# Patient Record
Sex: FEMALE | Race: WHITE
[De-identification: names, ages, dates, MRNs, and addresses within clinical notes are randomized per-mention and may not be internally consistent; named-entity substitution may affect disease eponyms.]

---

## 2020-05-03 ENCOUNTER — HOSPITAL ENCOUNTER (EMERGENCY)
Dept: HOSPITAL 97 - ER | Age: 70
Discharge: HOME | End: 2020-05-03
Payer: COMMERCIAL

## 2020-05-03 VITALS — OXYGEN SATURATION: 99 % | DIASTOLIC BLOOD PRESSURE: 80 MMHG | SYSTOLIC BLOOD PRESSURE: 171 MMHG

## 2020-05-03 VITALS — TEMPERATURE: 97.2 F

## 2020-05-03 DIAGNOSIS — R19.7: Primary | ICD-10-CM

## 2020-05-03 DIAGNOSIS — Z88.0: ICD-10-CM

## 2020-05-03 DIAGNOSIS — I10: ICD-10-CM

## 2020-05-03 LAB
ALBUMIN SERPL BCP-MCNC: 3.6 G/DL (ref 3.4–5)
ALP SERPL-CCNC: 74 U/L (ref 45–117)
ALT SERPL W P-5'-P-CCNC: 19 U/L (ref 12–78)
ANISOCYTOSIS BLD QL: (no result)
AST SERPL W P-5'-P-CCNC: 16 U/L (ref 15–37)
BLD SMEAR INTERP: (no result)
BUN BLD-MCNC: 16 MG/DL (ref 7–18)
GLUCOSE SERPLBLD-MCNC: 162 MG/DL (ref 74–106)
HCT VFR BLD CALC: 42.3 % (ref 36–45)
LIPASE SERPL-CCNC: 108 U/L (ref 73–393)
LYMPHOCYTES # SPEC AUTO: 1.4 K/UL (ref 0.7–4.9)
MORPHOLOGY BLD-IMP: (no result)
PMV BLD: 7.9 FL (ref 7.6–11.3)
POIKILOCYTOSIS BLD QL SMEAR: (no result)
POTASSIUM SERPL-SCNC: 3.7 MMOL/L (ref 3.5–5.1)
RBC # BLD: 5.29 M/UL (ref 3.86–4.86)

## 2020-05-03 PROCEDURE — 83690 ASSAY OF LIPASE: CPT

## 2020-05-03 PROCEDURE — 96374 THER/PROPH/DIAG INJ IV PUSH: CPT

## 2020-05-03 PROCEDURE — 80048 BASIC METABOLIC PNL TOTAL CA: CPT

## 2020-05-03 PROCEDURE — 85025 COMPLETE CBC W/AUTO DIFF WBC: CPT

## 2020-05-03 PROCEDURE — 99284 EMERGENCY DEPT VISIT MOD MDM: CPT

## 2020-05-03 PROCEDURE — 96361 HYDRATE IV INFUSION ADD-ON: CPT

## 2020-05-03 PROCEDURE — 80076 HEPATIC FUNCTION PANEL: CPT

## 2020-05-03 PROCEDURE — 36415 COLL VENOUS BLD VENIPUNCTURE: CPT

## 2020-05-03 PROCEDURE — 74177 CT ABD & PELVIS W/CONTRAST: CPT

## 2020-05-03 NOTE — XMS REPORT
[facility]

                            Created on:April 3, 2019



Patient:Lesly Dawson

Sex:Female

:1950

External Reference #:[id number]





Demographics







                          Address                   [address] [place] [address]

 

                          Phone                     [phone]

 

                          Preferred Language        en

 

                          Marital Status            Unknown

 

                          Restoration Affiliation     Unknown

 

                          Race                      White

 

                          Ethnic Group              Unknown









Author







                          Organization              [employer]









Care Team Providers







                    Name                Role                Phone

 

                    Bakari Naavrro      Provider Role       Unavailable









Allergies

No Known Allergies



Problems







             Problem Type Condition    Code         Onset Dates  Condition Statu

s

 

             Problem      Metabolic syndrome X E88.81                    Active

 

             Problem      Hyperlipidemia E78.5                     Active

 

             Problem      Benign essential HTN I10                       Active

 

             Problem      Primary osteoarthritis of right M17.11                

    Active



                          knee                                   

 

             Problem      Right sciatic nerve pain M54.31                    Act

carmina

 

             Problem      Pain, joint, knee, right M25.561                   Act

carmina

 

             Problem      GERD (gastroesophageal reflux K21.9                   

  Active



                          disease)                               

 

             Problem      Obese        E66.9                     Active

 

             Problem      Seborrheic keratoses L82.1                     Active

 

             Problem      Seasonal allergic rhinitis, J30.2                     

Active



                          unspecified trigger                           







Medications

No Known Medications



Results

No Known Results



Summary Purpose

eClinicalWorks Submission

## 2020-05-03 NOTE — RAD REPORT
EXAM DESCRIPTION:  CTAbdomen   Pelvis W Contrast - 5/3/2020 7:05 pm

 

CLINICAL HISTORY:  Abdominal pain.

ABD PAIN

 

COMPARISON:  No comparisons

 

TECHNIQUE:  Biphasic CT imaging of the abdomen and pelvis was performed with 100 ml non-ionic IV cont
rast.

 

All CT scans are performed using dose optimization technique as appropriate and may include automated
 exposure control or mA/KV adjustment according to patient size.

 

FINDINGS:  The lung bases are clear.Trace right pleural effusion.

 

The liver, spleen, pancreas, adrenal glands and kidneys are within normal limits.

 

No bowel obstruction, free air, free fluid or abscess. Mild sigmoid diverticulosis without diverticul
itis. Appendectomy suspected.  No evidence of significant lymphadenopathy.

 

No suspicious bony findings.

 

IMPRESSION:  No acute intra-abdominal or pelvic finding.

## 2020-05-03 NOTE — XMS REPORT
[facility]

                          Created on:2019



Patient:Lesly Dawson

Sex:Female

:1950

External Reference #:[id number]





Demographics







                          Address                   [address] [place] [address]

 

                          Phone                     [phone]

 

                          Preferred Language        en

 

                          Marital Status            Unknown

 

                          Hinduism Affiliation     Unknown

 

                          Race                      White

 

                          Ethnic Group              Unknown









Author







                          Organization              [employer]









Care Team Providers







                    Name                Role                Phone

 

                    Jc, Na            Provider Role       Unavailable









Allergies

No Known Allergies



Problems







             Problem Type Condition    Code         Onset Dates  Condition Statu

s

 

             Problem      Metabolic syndrome X E88.81                    Active

 

             Problem      Seborrheic keratoses L82.1                     Active

 

             Problem      Seasonal allergic rhinitis, J30.2                     

Active



                          unspecified trigger                           

 

             Problem      Primary osteoarthritis of left knee M17.12            

        Active

 

             Problem      Primary osteoarthritis of right M17.11                

    Active



                          knee                                   

 

             Problem      Pain, joint, knee, left M25.562                   Acti

ve

 

             Problem      Encounter for general adult medical Z00.00            

        Active



                          examination without abnormal                          

 



                          findings                               

 

             Problem      Screening for osteoporosis Z13.820                   A

ctive

 

             Problem      Right sciatic nerve pain M54.31                    Act

carmina

 

             Problem      Pain, joint, knee, right M25.561                   Act

carmina

 

             Assessment   Screening for osteoporosis Z13.820                   A

ctive

 

             Assessment   Benign essential HTN I10                       Active

 

             Assessment   Encounter for general adult medical Z00.00            

        Active



                          examination without abnormal                          

 



                          findings                               

 

             Problem      Obese        E66.9                     Active

 

             Problem      Benign essential HTN I10                       Active

 

             Assessment   Hyperlipidemia E78.5                     Active

 

             Problem      Hyperlipidemia E78.5                     Active

 

             Problem      GERD (gastroesophageal reflux K21.9                   

  Active



                          disease)                               







Medications

No Known Medications



Results

No Known Results



Summary Purpose

eClinicalWorks Submission

## 2020-05-03 NOTE — XMS REPORT
[facility]

                           Created on:2020



Patient:Lesly Dawson

Sex:Female

:1950

External Reference #:[id number]





Demographics







                          Address                   [address] [place] [address]

 

                          Phone                     [phone]

 

                          Preferred Language        en

 

                          Marital Status            Unknown

 

                          Moravian Affiliation     Unknown

 

                          Race                      White

 

                          Ethnic Group              Unknown









Author







                          Organization              [employer]









Care Team Providers







                    Name                Role                Phone

 

                    Jc, Na            Provider Role       Unavailable









Allergies

No Known Allergies



Problems







             Problem Type Condition    Code         Onset Dates  Condition Statu

s

 

             Problem      Metabolic syndrome X E88.81                    Active

 

             Problem      Seborrheic keratoses L82.1                     Active

 

             Problem      Seasonal allergic rhinitis, J30.2                     

Active



                          unspecified trigger                           

 

             Problem      Obese        E66.9                     Active

 

             Problem      Benign essential HTN I10                       Active

 

             Problem      Hyperlipidemia E78.5                     Active

 

             Problem      GERD (gastroesophageal reflux K21.9                   

  Active



                          disease)                               

 

             Problem      Primary osteoarthritis of left knee M17.12            

        Active

 

             Problem      Primary osteoarthritis of right M17.11                

    Active



                          knee                                   

 

             Problem      Pain, joint, knee, left M25.562                   Acti

ve

 

             Problem      Encounter for general adult medical Z00.00            

        Active



                          examination without abnormal                          

 



                          findings                               

 

             Problem      Screening for osteoporosis Z13.820                   A

ctive

 

             Problem      Right sciatic nerve pain M54.31                    Act

carmina

 

             Problem      Pain, joint, knee, right M25.561                   Act

carmina







Medications

No Known Medications



Results

No Known Results



Summary Purpose

eClinicalWorks Submission

## 2020-05-03 NOTE — ER
Nurse's Notes                                                                                     

 [facility]                                                                 

Name: Lesly Dawson                                                                               

Age: 69 yrs                                                                                       

Sex: Female                                                                                       

: 1950                                                                                   

MRN: [id number]                                                                                   

Arrival Date: 2020                                                                          

Time: 16:42                                                                                       

Account#: [id number]                                                                            

Bed 16                                                                                            

Private MD: Shakira Jc                                                                              

Diagnosis: Nausea and vomiting;Diarrhea, unspecified                                              

                                                                                                  

Presentation:                                                                                     

                                                                                             

16:57 Chief complaint: Patient states: "think I got food poisoning" Reports diarrhea since    ca1 

      0300 today, vomiting at the same time. Took 2 Imodium, diarrhea stopped but vomiting        

      persists. Reports can't keep anything down and abdominal cramping. Coronavirus screen:      

      Proceed with normal triage. Patient denies a cough. Patient denies shortness of breath      

      or difficulty breathing. Patient denies measured and/or subjective temperature greater      

      than 100.4F prior to today's visit. Patient denies travel on a cruise ship or to a          

      country the [facility] currently lists as an affected area. Patient denies contact with known      

      and/or suspected case of COVID-19. Ebola Screen: Patient negative for fever greater         

      than or equal to 101.5 degrees Fahrenheit, and additional compatible Ebola Virus            

      Disease symptoms Patient denies exposure to infectious person. Patient denies travel to     

      an Ebola-affected area in the 21 days before illness onset. No symptoms or risks            

      identified at this time. Initial Sepsis Screen: Does the patient meet any 2 criteria?       

      No. Patient's initial sepsis screen is negative. Does the patient have a suspected          

      source of infection? No. Patient's initial sepsis screen is negative. Risk Assessment:      

      Do you want to hurt yourself or someone else? Patient reports no desire to harm self or     

      others. Onset of symptoms was May 03, 2020 at 03:00.                                        

16:57 Method Of Arrival: Ambulatory                                                           ca1 

16:57 Acuity: STACIE 3                                                                           ca1 

                                                                                                  

Historical:                                                                                       

- Allergies:                                                                                      

17:03 PENICILLINS;                                                                            ca1 

- Home Meds:                                                                                      

17:03 Metoprolol Tartrate Oral [Active]; furosemide Oral [Active]; Quinapril HCl Oral         ca1 

      [Active];                                                                                   

- PMHx:                                                                                           

17:03 Hypertension;                                                                           ca1 

- PSHx:                                                                                           

17:03 Hysterectomy; Appendectomy;                                                             ca1 

                                                                                                  

- Immunization history:: Adult Immunizations up to date, Pneumococcal vaccine is up to            

  date, Flu vaccine is up to date.                                                                

- Social history:: Smoking status: Patient/guardian denies using tobacco, the patient             

  reports quitting approximately 30 years ago.                                                    

                                                                                                  

                                                                                                  

Screenin:05 Abuse screen: Denies threats or abuse. Nutritional screening: No deficits noted.        rb1 

      Tuberculosis screening: No symptoms or risk factors identified. Fall Risk None              

      identified.                                                                                 

                                                                                                  

Assessment:                                                                                       

17:05 General: Appears in no apparent distress. comfortable, Behavior is calm, cooperative,   rb1 

      Denies fever. Pain: Complains of pain in abdomen Pain currently is 8 out of 10 on a         

      pain scale. Quality of pain is described as crampy, Pain began 0300 this morning.           

      Neuro: Level of Consciousness is awake, alert, obeys commands, Oriented to person,          

      place, time, situation. Cardiovascular: Capillary refill < 3 seconds. Respiratory:          

      Airway is patent Respiratory effort is even, unlabored, Respiratory pattern is regular,     

      symmetrical. GI: Reports diarrhea, nausea, vomiting, since 0300 this morning. : No        

      signs and/or symptoms were reported regarding the genitourinary system. Derm: Skin is       

      pink, warm \T\ dry.                                                                         

18:00 Reassessment: Patient appears in no apparent distress at this time. No changes from     rb1 

      previously documented assessment.                                                           

19:30 GI: Abdomen is round non-distended, Reports upper abdominal pain, diarrhea, nausea.     rr5 

19:30 General: Appears in no apparent distress. comfortable, Behavior is calm, cooperative,   rr5 

      appropriate for age, discharge instruction given and explained without complaints           

      made.. Neuro: Level of Consciousness is awake, alert, obeys commands, Oriented to           

      person, place, time, situation. Cardiovascular: Capillary refill < 3 seconds Patient's      

      skin is warm and dry. Respiratory: Airway is patent Respiratory effort is even,             

      unlabored, Respiratory pattern is regular, symmetrical. Derm: Skin is intact, is            

      healthy with good turgor, Skin is pink, warm \T\ dry.                                       

                                                                                                  

Vital Signs:                                                                                      

16:57  / 106; Pulse 100; Resp 18 S; Temp 97.2; Pulse Ox 98% on R/A; Weight 86.18 kg     ca1 

      (R); Height 5 ft. 3 in. (160.02 cm) (R); Pain 8/10;                                         

18:00  / 85; Pulse 86; Resp 17; Pulse Ox 97% on R/A;                                    rb1 

19:30  / 80; Pulse 80; Resp 16; Pulse Ox 99% on R/A;                                    rr5 

16:57 Body Mass Index 33.66 (86.18 kg, 160.02 cm)                                             ca1 

                                                                                                  

ED Course:                                                                                        

16:42 Patient arrived in ED.                                                                  ag5 

16:42 Shakira Jc MD is Private Physician.                                                      ag5 

17:00 Triage completed.                                                                       ca1 

17:03 Arm band placed on right wrist.                                                         ca1 

17:05 Patient has correct armband on for positive identification. Bed in low position. Call   rb1 

      light in reach. Side rails up X 1. Pulse ox on. NIBP on.                                    

17:13 [facility]                                                        kb  

17:13 Chase Henley MD is Attending Physician.                                                kb  

17:28 Lamar Browne, RN is Primary Nurse.                                                   rb1 

17:38 Inserted saline lock: 22 gauge in left antecubital area, using aseptic technique. Blood rb1 

      collected.                                                                                  

19:05 CT Abd/Pelvis - IV Contrast Only In Process Unspecified.                                EDMS

19:39 No provider procedures requiring assistance completed. IV discontinued, intact,         rr5 

      bleeding controlled, No redness/swelling at site. Pressure dressing applied.                

                                                                                                  

Administered Medications:                                                                         

17:55 Drug: NS 0.9% 1000 ml Route: IV; Rate: 1000 ml; Site: left antecubital;                 rb1 

19:30 Follow up: Response: No adverse reaction; IV Status: Completed infusion; IV Intake:     rr5 

      1000ml                                                                                      

17:55 Drug: Zofran (Ondansetron) 4 mg Route: IVP; Site: left antecubital;                     rb1 

19:40 Follow up: Response: No adverse reaction                                                rr5 

17:55 Drug: Bentyl 20 mg Route: PO;                                                           rb1 

19:40 Follow up: Response: No adverse reaction                                                rr5 

                                                                                                  

                                                                                                  

Intake:                                                                                           

19:30 IV: 1000ml; Total: 1000ml.                                                              rr5 

                                                                                                  

Outcome:                                                                                          

19:25 Discharge ordered by MD.                                                                kb  

19:39 Discharged to home ambulatory.                                                          rr5 

19:39 Condition: stable                                                                           

19:39 Discharge instructions given to patient, Instructed on discharge instructions, follow       

      up and referral plans. medication usage, Demonstrated understanding of instructions,        

      follow-up care, medications, Prescriptions given X 2.                                       

19:41 Patient left the ED.                                                                    rr5 

                                                                                                  

Signatures:                                                                                       

Dispatcher [employer]                           EDMS                                                 

Kyra Zuñiga FNP-C FNP-Lamar Alvarez RN                     RN   rb1                                                  

Josue Mejias RN                      RN   rr5                                                  

Elle Fowler RN RN   ca1                                                  

Carlito Parmar                                ag5                                                  

                                                                                                  

Corrections: (The following items were deleted from the chart)                                    

17:57 17:00 Zofran (Ondansetron) 4 mg IVP in left antecubital rb1                             rb1 

                                                                                                  

**************************************************************************************************

## 2020-05-03 NOTE — XMS REPORT
Patient Summary Document

                             Created on:May 3, 2020



Patient:BRENT BARRERA

Sex:Female

:1950

External Reference #:[id number]





Demographics







                          Address                   [address] [place] [address]

 

                          Home Phone                [phone]

 

                          Email Address             [email or web address]

 

                          Preferred Language        Unknown

 

                          Marital Status            Unknown

 

                          Rastafari Affiliation     Unknown

 

                          Race                      Unknown

 

                          Additional Race(s)        Unavailable

 

                          Ethnic Group              Unknown









Author







                          Organization              [facility]

t

 

                          Address                   [address] [place] [address] [address]



                                                    [place] [address]

 

                          Phone                     [phone]









Care Team Providers







                    Name                Role                Phone

 

                    Unavailable         Unavailable         Unavailable









Problems







        Condition Condition Condition Status  Onset   Resolution Last    Treatin

g Comments



        Name    Details Category         Date    Date    Treatment Clinician 



                                                        Date            

 

        Seasonal Seasonal Problem Active                                  



        allergic allergic                                                 



        rhinitis, rhinitis,                                                 



        unspecified unspecified                                                 



        trigger trigger                                                 

 

        Hyperlipide Hyperlipide Problem Active                                  



        isis     isis                                                     

 

        Seborrheic Seborrheic Problem Active                                  



        keratoses keratoses                                                 

 

        Obese   Obese   Problem Active                                  

 

        Metabolic Metabolic Problem Active                                  



        syndrome X syndrome X                                                 

 

        Benign  Benign  Problem Active                                  



        essential essential                                                 



        HTN     HTN                                                     

 

        GERD    GERD    Problem Active                                  



        (gastroesop (gastroesop                                                 



        hageal  hageal                                                  



        reflux  reflux                                                  



        disease) disease)                                                 

 

        Right   Right   Problem Active                                  



        sciatic sciatic                                                 



        nerve pain nerve pain                                                 

 

        Primary Primary Problem Active                                  



        osteoarthri osteoarthri                                                 



        tis of  tis of                                                  



        right knee right knee                                                 

 

        Pain,   Pain,   Problem Active                                  



        joint,  joint,                                                  



        knee, right knee, right                                                 

 

        Pain,   Pain,   Problem Active                                  



        joint,  joint,                                                  



        knee, left knee, left                                                 

 

        Primary Primary Problem Active                                  



        osteoarthri osteoarthri                                                 



        tis of left tis of left                                                 



        knee    knee                                                    

 

        Encounter Encounter Problem Active                                  



        for general for general                                                 



        adult   adult                                                   



        medical medical                                                 



        examination examination                                                 



        without without                                                 



        abnormal abnormal                                                 



        findings findings                                                 

 

        Screening Screening Problem Active                                  



        for     for                                                     



        osteoporosi osteoporosi                                                 



        s       s                                                       







Allergies, Adverse Reactions, Alerts







        Allergy Allergy Status  Severity Reaction(s) Onset   Inactive Treating C

omments



        Name    Type                            Date    Date    Clinician 

 

        PCN     Adverse Active          Info Not                         



                Reaction                 Available                         







Medications







       Ordered Filled Start  Stop   Current Ordering Indication Dosage Frequency

 Signature

                          Comments                  Components



      Medication Medication Date  Date  Medication? Clinician                   

(SIG)       



      Name  Name                                                        

 

      Benzonatate Benzonatate 2020- Yes   Na Jc                   1 cap

saman       



                  3-06  04-05                               as needed       



                  00:00: 00:00                                           



                  00    :00                                             







Encounters







        Start   End     Encounter Admission Attending Care    Care    Encounter



        Date/Time Date/Time Type    Type    Clinicians Facility Department ID

 

        2020 Outpatient                 [facility]

[id number]



        15:38:00 15:38:00                         [facility]  Family  



                                                Medicine Medicine 

 

        2020 Outpatient                 [facility]

[id number]



        14:22:00 14:22:00                         Specialty/U Specialty/U 



                                                rology  rology  



                                                Clinic  [facility]  

 

        2019 Outpatient                 [facility]

[id number]



        09:00:00 09:00:00                         [employer] 



                                                [place] 

 

        2019 Outpatient                 [facility]

[id number]



        12:18:00 12:18:00                         [employer] 



                                                [place] 

 

        2019 Outpatient                 [facility]

[id number]



        13:00:00 13:00:00                         [employer] 



                                                [place] 

 

        2019 Outpatient                 [facility]

[id number]



        09:22:00 09:22:00                         [employer] 



                                                [place] 

 

        2019 Outpatient                 [facility]

[id number]



        13:30:00 13:30:00                         [facility]   [facility]   



                                                [facility] 

 

        2019 Outpatient                 [facility]

[id number]



        14:00:00 14:00:00                         [facility] 



                                                [facility]   [facility]   



                                                [facility] 

 

        2019 Outpatient                 [facility]

[id number]



        11:42:00 11:42:00                         [facility] 



                                                [facility]   Joint   



                                                [facility] 

 

        2019 Outpatient                 [facility]

[id number]



        11:00:00 11:00:00                         [facility] 



                                                [facility]   [facility]   



                                                [facility] 

 

        2019-04-15 2019-04-15 Outpatient                 [facility]

[id number]



        16:04:00 16:04:00                         [facility] 

 

        2019 Outpatient                 [facility]

[id number]



        15:41:00 15:41:00                         [facility] 



                                                [facility]   Joint   



                                                [facility] 

 

        2019 Outpatient                 [facility]

[id number]



        09:30:00 09:30:00                         [facility] 



                                                [facility]   Joint   



                                                [facility] 

 

        2019 Outpatient                 [facility]

[id number]



        14:19:00 14:19:00                         [facility] 



                                                [facility]   Joint   



                                                [facility] 

 

        2019 Outpatient                 [facility]

[id number]



        16:44:00 16:44:00                         [facility]

## 2020-05-03 NOTE — XMS REPORT
[facility]

                            Created on:2019



Patient:Lesly Dawson

Sex:Female

:1950

External Reference #:[id number]





Demographics







                          Address                   [address] [place] [address]

 

                          Phone                     [phone]

 

                          Preferred Language        en

 

                          Marital Status            Unknown

 

                          Nondenominational Affiliation     Unknown

 

                          Race                      White

 

                          Ethnic Group              Unknown









Author







                          Organization              [employer]









Care Team Providers







                    Name                Role                Phone

 

                    Bakari Navarro      Provider Role       Unavailable









Allergies, Adverse Reactions, Alerts







                    Substance           Reaction            Event Type

 

                    PCN                 Info Not Available  Drug Allergy







Problems







             Problem Type Condition    Code         Onset Dates  Condition Statu

s

 

             Problem      Hyperlipidemia E78.5                     Active

 

             Problem      GERD (gastroesophageal reflux K21.9                   

  Active



                          disease)                               

 

             Problem      Obese        E66.9                     Active

 

             Problem      Pain, joint, knee, left M25.562                   Acti

ve

 

             Problem      Pain, joint, knee, right M25.561                   Act

carmina

 

             Problem      Primary osteoarthritis of left knee M17.12            

        Active

 

             Problem      Seborrheic keratoses L82.1                     Active

 

             Problem      Seasonal allergic rhinitis, J30.2                     

Active



                          unspecified trigger                           

 

             Problem      Primary osteoarthritis of right M17.11                

    Active



                          knee                                   

 

             Problem      Right sciatic nerve pain M54.31                    Act

carmina

 

             Assessment   Primary osteoarthritis of left knee M17.12            

        Active

 

             Problem      Metabolic syndrome X E88.81                    Active

 

             Assessment   Primary osteoarthritis of right M17.11                

    Active



                          knee                                   

 

             Problem      Benign essential HTN I10                       Active







Medications







        Medication Code    Code    Instructions Start   End     Status  Dosage



                System                  Date    Date            

 

        Low-Dose Aspirin NDC     0                               Active  not



                                                                defined

 

        Tums    NDC     16443904259 500 MG Orally                 Active  1 tabl

et



                                Once a day                         

 

        Hydrochlorothiazide NDC     68975589402 25 MG Orally                 Act

carmina  1 tablet



                                Once a day                         in the



                                                                morning

 

        Accupril NDC     85285709537 40 MG Orally                 Active  1 tabl

et



                                Once a day                         

 

        Tessalon Perles NDC     [id number] 100MG                   Active  1 ca

p(s)



                                                                3 times a



                                                                day as



                                                                needed

 

        Flonase ND     78812253419 50 MCG/ACT                 Active  1 spray



                                Nasally Once a                         in each



                                day                             nostril

 

        Omeprazole NDC     20241042730 40 MG Orally                 Active  1 ca

psule



                                Once a day                         

 

        Metoprolol Succinate NDC     [id number] 25 MG Orally                 Ac

tive  1 tablet



        ER                      Once a day                         







Results

No Known Results



Summary Purpose

[employer] Submission

## 2020-05-03 NOTE — XMS REPORT
[facility]

                            Created on:2019



Patient:Lesly Dawson

Sex:Female

:1950

External Reference #:[id number]





Demographics







                          Address                   [address] [place] [address]

 

                          Phone                     [phone]

 

                          Preferred Language        en

 

                          Marital Status            Unknown

 

                          Caodaism Affiliation     Unknown

 

                          Race                      White

 

                          Ethnic Group              Unknown









Author







                          Organization              [facility]









Care Team Providers







                    Name                Role                Phone

 

                    Jc, Na            Provider Role       Unavailable









Allergies

No Known Allergies



Problems







             Problem Type Condition    Code         Onset Dates  Condition Statu

s

 

             Problem      Hyperlipidemia E78.5                     Active

 

             Problem      GERD (gastroesophageal reflux K21.9                   

  Active



                          disease)                               

 

             Problem      Obese        E66.9                     Active

 

             Problem      Metabolic syndrome X E88.81                    Active

 

             Problem      Benign essential HTN I10                       Active

 

             Problem      Pain, joint, knee, left M25.562                   Acti

ve

 

             Problem      Pain, joint, knee, right M25.561                   Act

carmina

 

             Problem      Primary osteoarthritis of left knee M17.12            

        Active

 

             Problem      Seborrheic keratoses L82.1                     Active

 

             Problem      Seasonal allergic rhinitis, J30.2                     

Active



                          unspecified trigger                           

 

             Problem      Primary osteoarthritis of right M17.11                

    Active



                          knee                                   

 

             Problem      Right sciatic nerve pain M54.31                    Act

carmina







Medications







        Medication Code System Code    Instructions Start   End Date Status  Dos

age



                                        Date                    

 

        Rogers Lazaro NDC     [id number] 100MG                   Active  1 ca

p(s) 3



                                                                times a



                                                                day as



                                                                needed







Results

No Known Results



Summary Purpose

[employer] Submission

## 2020-05-03 NOTE — XMS REPORT
[facility]

                            Created on:2019



Patient:Lesly Dawson

Sex:Female

:1950

External Reference #:[id number]





Demographics







                          Address                   [address] [place] [address]

 

                          Phone                     [phone]

 

                          Preferred Language        en

 

                          Marital Status            Unknown

 

                          Christianity Affiliation     Unknown

 

                          Race                      White

 

                          Ethnic Group              Unknown









Author







                          Organization              [facility]









Care Team Providers







                    Name                Role                Phone

 

                    Bakari Navarro      Provider Role       Unavailable









Allergies

No Known Allergies



Problems







             Problem Type Condition    Code         Onset Dates  Condition Statu

s

 

             Problem      Seasonal allergic rhinitis, J30.2                     

Active



                          unspecified trigger                           

 

             Problem      Hyperlipidemia E78.5                     Active

 

             Problem      Seborrheic keratoses L82.1                     Active

 

             Problem      Obese        E66.9                     Active

 

             Problem      Metabolic syndrome X E88.81                    Active

 

             Problem      Benign essential HTN I10                       Active

 

             Problem      GERD (gastroesophageal reflux K21.9                   

  Active



                          disease)                               







Medications

No Known Medications



Results

No Known Results



Summary Purpose

[employer] Submission

## 2020-05-03 NOTE — XMS REPORT
[facility]

                            Created on:2020



Patient:Lesly Dawson

Sex:Female

:1950

External Reference #:[id number]





Demographics







                          Address                   [address] [place] [address]

 

                          Phone                     [phone]

 

                          Preferred Language        en

 

                          Marital Status            Unknown

 

                          Caodaism Affiliation     Unknown

 

                          Race                      White

 

                          Ethnic Group              Unknown









Author







                          Organization              [facility]









Care Team Providers







                    Name                Role                Phone

 

                    Jc, Na            Provider Role       Unavailable









Allergies

No Known Allergies



Problems







             Problem Type Condition    Code         Onset Dates  Condition Statu

s

 

             Problem      Metabolic syndrome X E88.81                    Active

 

             Problem      Seborrheic keratoses L82.1                     Active

 

             Problem      Seasonal allergic rhinitis, J30.2                     

Active



                          unspecified trigger                           

 

             Problem      Obese        E66.9                     Active

 

             Problem      Benign essential HTN I10                       Active

 

             Problem      Hyperlipidemia E78.5                     Active

 

             Problem      GERD (gastroesophageal reflux K21.9                   

  Active



                          disease)                               

 

             Problem      Primary osteoarthritis of left knee M17.12            

        Active

 

             Problem      Primary osteoarthritis of right M17.11                

    Active



                          knee                                   

 

             Problem      Pain, joint, knee, left M25.562                   Acti

ve

 

             Problem      Encounter for general adult medical Z00.00            

        Active



                          examination without abnormal                          

 



                          findings                               

 

             Problem      Screening for osteoporosis Z13.820                   A

ctive

 

             Problem      Right sciatic nerve pain M54.31                    Act

carmina

 

             Problem      Pain, joint, knee, right M25.561                   Act

carmina







Medications







        Medication Code    Code    Instructions Start   End Date Status  Dosage



                System                  Date                    

 

        Benzonatate NDC     [id number] 100 MG Orally , , Active

  1 capsule



                                two times a day 2020            as neede

d



                                prn cough                         







Results

No Known Results



Summary Purpose

[employer] Submission

## 2020-05-03 NOTE — XMS REPORT
[facility]

                          Created on:2019



Patient:Lesly Dawson

Sex:Female

:1950

External Reference #:[id number]





Demographics







                          Address                   [address] [place] [address]

 

                          Phone                     [phone]

 

                          Preferred Language        en

 

                          Marital Status            Unknown

 

                          Jainism Affiliation     Unknown

 

                          Race                      White

 

                          Ethnic Group              Unknown









Author







                          Organization              [employer]









Care Team Providers







                    Name                Role                Phone

 

                    Jc, Na            Provider Role       Unavailable









Allergies

No Known Allergies



Problems







             Problem Type Condition    Code         Onset Dates  Condition Statu

s

 

             Problem      Metabolic syndrome X E88.81                    Active

 

             Problem      Seborrheic keratoses L82.1                     Active

 

             Problem      Seasonal allergic rhinitis, J30.2                     

Active



                          unspecified trigger                           

 

             Problem      Obese        E66.9                     Active

 

             Problem      Benign essential HTN I10                       Active

 

             Problem      Hyperlipidemia E78.5                     Active

 

             Problem      GERD (gastroesophageal reflux K21.9                   

  Active



                          disease)                               

 

             Problem      Primary osteoarthritis of left knee M17.12            

        Active

 

             Problem      Primary osteoarthritis of right M17.11                

    Active



                          knee                                   

 

             Problem      Pain, joint, knee, left M25.562                   Acti

ve

 

             Problem      Encounter for general adult medical Z00.00            

        Active



                          examination without abnormal                          

 



                          findings                               

 

             Problem      Screening for osteoporosis Z13.820                   A

ctive

 

             Problem      Right sciatic nerve pain M54.31                    Act

carmina

 

             Problem      Pain, joint, knee, right M25.561                   Act

carmina







Medications

No Known Medications



Results

No Known Results



Summary Purpose

[employer] Submission

## 2020-05-03 NOTE — XMS REPORT
[facility]

                            Created on:2019



Patient:Lesly Dawson

Sex:Female

:1950

External Reference #:[id number]





Demographics







                          Address                   [address] [place] [address]

 

                          Phone                     [phone]

 

                          Preferred Language        en

 

                          Marital Status            Unknown

 

                          Judaism Affiliation     Unknown

 

                          Race                      White

 

                          Ethnic Group              Unknown









Author







                          Organization              [employer]









Care Team Providers







                    Name                Role                Phone

 

                    NavarroBakari      Provider Role       Unavailable









Allergies

No Known Allergies



Problems







             Problem Type Condition    Code         Onset Dates  Condition Statu

s

 

             Problem      Hyperlipidemia E78.5                     Active

 

             Problem      GERD (gastroesophageal reflux K21.9                   

  Active



                          disease)                               

 

             Problem      Obese        E66.9                     Active

 

             Problem      Metabolic syndrome X E88.81                    Active

 

             Problem      Benign essential HTN I10                       Active

 

             Problem      Pain, joint, knee, left M25.562                   Acti

ve

 

             Problem      Pain, joint, knee, right M25.561                   Act

carmina

 

             Problem      Primary osteoarthritis of left knee M17.12            

        Active

 

             Problem      Seborrheic keratoses L82.1                     Active

 

             Problem      Seasonal allergic rhinitis, J30.2                     

Active



                          unspecified trigger                           

 

             Problem      Primary osteoarthritis of right M17.11                

    Active



                          knee                                   

 

             Problem      Right sciatic nerve pain M54.31                    Act

carmina







Medications

No Known Medications



Results

No Known Results



Summary Purpose

[employer] Submission

## 2020-05-03 NOTE — XMS REPORT
[facility]

                          Created on:2019



Patient:Lesly Dawson

Sex:Female

:1950

External Reference #:[id number]





Demographics







                          Address                   [address] [place] [address]

 

                          Phone                     [phone]

 

                          Preferred Language        en

 

                          Marital Status            Unknown

 

                          Hinduism Affiliation     Unknown

 

                          Race                      White

 

                          Ethnic Group              Unknown









Author







                          Organization              [employer]









Care Team Providers







                    Name                Role                Phone

 

                    Jc, Na            Provider Role       Unavailable









Allergies

No Known Allergies



Problems







             Problem Type Condition    Code         Onset Dates  Condition Statu

s

 

             Problem      Metabolic syndrome X E88.81                    Active

 

             Problem      Seborrheic keratoses L82.1                     Active

 

             Problem      Seasonal allergic rhinitis, J30.2                     

Active



                          unspecified trigger                           

 

             Problem      Primary osteoarthritis of left knee M17.12            

        Active

 

             Problem      Primary osteoarthritis of right M17.11                

    Active



                          knee                                   

 

             Problem      Pain, joint, knee, left M25.562                   Acti

ve

 

             Problem      Encounter for general adult medical Z00.00            

        Active



                          examination without abnormal                          

 



                          findings                               

 

             Problem      Screening for osteoporosis Z13.820                   A

ctive

 

             Problem      Right sciatic nerve pain M54.31                    Act

carmina

 

             Problem      Pain, joint, knee, right M25.561                   Act

carmina

 

             Assessment   Cough        R05                       Active

 

             Assessment   Sinus congestion R09.81                    Active

 

             Problem      Obese        E66.9                     Active

 

             Problem      Benign essential HTN I10                       Active

 

             Assessment   Seasonal allergic rhinitis, J30.2                     

Active



                          unspecified trigger                           

 

             Problem      Hyperlipidemia E78.5                     Active

 

             Problem      GERD (gastroesophageal reflux K21.9                   

  Active



                          disease)                               







Medications







        Medication Code    Code    Instructions Start   End     Status  Dosage



                System                  Date    Date            

 

        Hydrochlorothiazide NDC     [id number] 25 MG Orally                 Act

carmina  1 tablet



                                Once a day                         in the



                                                                morning

 

        Accupril NDC     [id number] 40 MG Orally                 Active  1 tabl

et



                                Once a day                         

 

        Metoprolol Succinate NDC     [id number] 25 MG Orally                 Ac

tive  1 tablet



        ER                      Once a day                         

 

        Benzonatate ND     [id number] 100 MG Orally Dec 20, Asim     Active  1 

capsule



                                two times a     19,             as needed



                                day prn cough                     

 

        Flonase ND     [id number] 50 MCG/ACT                 Active  1 spray



                                Nasally Once a                         in each



                                day                             nostril

 

        Fish Oil ND     [id number] 1000 MG Orally                 Active  1 ca

psule



                                Once a day                         







Results

No Known Results



Summary Purpose

eClinicalWorks Submission

## 2020-05-03 NOTE — EDPHYS
Physician Documentation                                                                           

 [facility]                                                                 

Name: Lesly Dawson                                                                               

Age: 69 yrs                                                                                       

Sex: Female                                                                                       

: 1950                                                                                   

MRN: [id number]                                                                                   

Arrival Date: 2020                                                                          

Time: 16:42                                                                                       

Account#: [id number]                                                                            

Bed 16                                                                                            

Private MD: Shakira Jc                                                                              

ED Physician Chase Henley                                                                         

HPI:                                                                                              

                                                                                             

18:08 This 69 yrs old  Female presents to ER via Ambulatory with complaints of       kb  

      Nausea/Vomiting/Diarrhea.                                                                   

18:08 The patient presents to the emergency department with nausea, vomiting, diarrhea,       kb  

      abdominal pain, described as crampy. Onset: The symptoms/episode began/occurred this        

      morning, at 03:00. Possible causes: unknown. The symptoms are aggravated by nothing.        

      The symptoms are alleviated by nothing. Associated signs and symptoms: Pertinent            

      positives: abdominal pain, diarrhea, nausea, vomiting. Severity of symptoms: At their       

      worst the symptoms were moderate in the emergency department the symptoms are               

      unchanged. The patient has not experienced similar symptoms in the past. The patient        

      has not recently seen a physician. Pt reports diarrhea that started at 0300, then n/v       

      and abd cramps. Denies fever.                                                               

                                                                                                  

Historical:                                                                                       

- Allergies:                                                                                      

17:03 PENICILLINS;                                                                            ca1 

- Home Meds:                                                                                      

17:03 Metoprolol Tartrate Oral [Active]; furosemide Oral [Active]; Quinapril HCl Oral         ca1 

      [Active];                                                                                   

- PMHx:                                                                                           

17:03 Hypertension;                                                                           ca1 

- PSHx:                                                                                           

17:03 Hysterectomy; Appendectomy;                                                             ca1 

                                                                                                  

- Immunization history:: Adult Immunizations up to date, Pneumococcal vaccine is up to            

  date, Flu vaccine is up to date.                                                                

- Social history:: Smoking status: Patient/guardian denies using tobacco, the patient             

  reports quitting approximately 30 years ago.                                                    

                                                                                                  

                                                                                                  

ROS:                                                                                              

18:07 Constitutional: Negative for fever, chills, and weight loss, ENT: Negative for injury,  kb  

      pain, and discharge, Neck: Negative for injury, pain, and swelling, Cardiovascular:         

      Negative for chest pain, palpitations, and edema, Respiratory: Negative for shortness       

      of breath, cough, wheezing, and pleuritic chest pain, Back: Negative for injury and         

      pain, MS/Extremity: Negative for injury and deformity, Skin: Negative for injury, rash,     

      and discoloration, Neuro: Negative for headache, weakness, numbness, tingling, and          

      seizure.                                                                                    

18:07 Abdomen/GI: Positive for nausea, vomiting, and diarrhea, abdominal cramps.                  

                                                                                                  

Exam:                                                                                             

18:07 Constitutional:  This is a well developed, well nourished patient who is awake, alert,  kb  

      and in no acute distress. Head/Face:  Normocephalic, atraumatic. Chest/axilla:  Normal      

      chest wall appearance and motion.  Nontender with no deformity.  No lesions are             

      appreciated. Cardiovascular:  Regular rate and rhythm with a normal S1 and S2.  No          

      gallops, murmurs, or rubs.  Normal PMI, no JVD.  No pulse deficits. Respiratory:  Lungs     

      have equal breath sounds bilaterally, clear to auscultation and percussion.  No rales,      

      rhonchi or wheezes noted.  No increased work of breathing, no retractions or nasal          

      flaring. Back:  No spinal tenderness.  No costovertebral tenderness.  Full range of         

      motion. Skin:  Warm, dry with normal turgor.  Normal color with no rashes, no lesions,      

      and no evidence of cellulitis. MS/ Extremity:  Pulses equal, no cyanosis.                   

      Neurovascular intact.  Full, normal range of motion. Neuro:  Awake and alert, GCS 15,       

      oriented to person, place, time, and situation.  Cranial nerves II-XII grossly intact.      

      Motor strength 5/5 in all extremities.  Sensory grossly intact.  Cerebellar exam            

      normal.  Normal gait.                                                                       

18:07 Abdomen/GI: Inspection: abdomen appears normal, Bowel sounds: normal, in all quadrants,     

      Palpation: soft, in all quadrants, mild abdominal tenderness, in the right lower            

      quadrant and left lower quadrant.                                                           

                                                                                                  

Vital Signs:                                                                                      

16:57  / 106; Pulse 100; Resp 18 S; Temp 97.2; Pulse Ox 98% on R/A; Weight 86.18 kg     ca1 

      (R); Height 5 ft. 3 in. (160.02 cm) (R); Pain 8/10;                                         

18:00  / 85; Pulse 86; Resp 17; Pulse Ox 97% on R/A;                                    rb1 

19:30  / 80; Pulse 80; Resp 16; Pulse Ox 99% on R/A;                                    rr5 

16:57 Body Mass Index 33.66 (86.18 kg, 160.02 cm)                                             ca1 

                                                                                                  

MDM:                                                                                              

17:13 Patient medically screened.                                                             kb  

18:08 Data reviewed: vital signs, nurses notes. Data interpreted: Pulse oximetry: on room air kb  

      is 97 %. Interpretation: normal.                                                            

19:15 Counseling: I had a detailed discussion with the patient and/or guardian regarding: the kb  

      historical points, exam findings, and any diagnostic results supporting the                 

      discharge/admit diagnosis, lab results, radiology results, the need for outpatient          

      follow up, a family practitioner, to return to the emergency department if symptoms         

      worsen or persist or if there are any questions or concerns that arise at home.             

                                                                                                  

                                                                                             

17:13 Order name: Basic Metabolic Panel; Complete Time: 18:10                                 kb  

                                                                                             

17:13 Order name: CBC with Diff; Complete Time: 19:20                                         kb  

                                                                                             

17:13 Order name: Hepatic Function; Complete Time: 18:10                                      kb  

                                                                                             

17:13 Order name: Lipase; Complete Time: 18:10                                                kb  

                                                                                             

18:11 Order name: CT Abd/Pelvis - IV Contrast Only; Complete Time: 19:14                      kb  

                                                                                             

19:13 Order name: CBC Smear Scan; Complete Time: 19:20                                        EDMS

                                                                                             

17:13 Order name: IV Saline Lock; Complete Time: 17:57                                        kb  

                                                                                             

17:13 Order name: Labs collected and sent; Complete Time: 17:57                               kb  

                                                                                             

19:15 Order name: PO challenge; Complete Time: 19:25                                          kb  

                                                                                                  

Administered Medications:                                                                         

17:55 Drug: NS 0.9% 1000 ml Route: IV; Rate: 1000 ml; Site: left antecubital;                 rb1 

19:30 Follow up: Response: No adverse reaction; IV Status: Completed infusion; IV Intake:     rr5 

      1000ml                                                                                      

17:55 Drug: Zofran (Ondansetron) 4 mg Route: IVP; Site: left antecubital;                     rb1 

19:40 Follow up: Response: No adverse reaction                                                rr5 

17:55 Drug: Bentyl 20 mg Route: PO;                                                           rb1 

19:40 Follow up: Response: No adverse reaction                                                rr5 

                                                                                                  

                                                                                                  

Disposition:                                                                                      

                                                                                             

07:21 Co-signature as Attending Physician, Chase Henley MD.                                    rn  

                                                                                                  

Disposition:                                                                                      

20 19:25 Discharged to Home. Impression: Nausea and vomiting, Diarrhea, unspecified.        

- Condition is Stable.                                                                            

- Discharge Instructions: Food Choices to Help Relieve Diarrhea, Adult, Viral                     

  Gastroenteritis, Adult, Easy-to-Read, Nausea and Vomiting, Adult, Easy-to-Read,                 

  Diarrhea, Adult, Easy-to-Read.                                                                  

- Prescriptions for Bentyl 20 mg Oral Tablet - take 1 tablet by ORAL route every 6                

  hours As needed; 20 tablet. Zofran 4 mg Oral Tablet - take 1 tablet by ORAL route               

  every 6 hours As needed; 20 tablet.                                                             

- Medication Reconciliation Form, Thank You Letter, Antibiotic Education, Prescription            

  Opioid Use form.                                                                                

- Follow up: Emergency Department; When: As needed; Reason: Worsening of condition.               

  Follow up: Private Physician; When: 2 - 3 days; Reason: Recheck today's complaints,             

  Continuance of care, Re-evaluation by your physician.                                           

                                                                                                  

                                                                                                  

                                                                                                  

Signatures:                                                                                       

Dispatcher [employer]                           EDMS                                                 

Kyra Zuñiga, FNP-C                 FNP-CkChase Crowley MD MD   rn                                                   

Lamar Browne, RN                     RN   rb1                                                  

Josue Mejias RN                      RN   rr5                                                  

Elle Fowler RN                        RN   ca1                                                  

                                                                                                  

Corrections: (The following items were deleted from the chart)                                    

                                                                                             

19:41 19:25 2020 19:25 Discharged to Home. Impression: Nausea and vomiting; Diarrhea,   rr5 

      unspecified. Condition is Stable. Forms are Medication Reconciliation Form, Thank You       

      Letter, Antibiotic Education, Prescription Opioid Use. Follow up: Emergency Department;     

      When: As needed; Reason: Worsening of condition. Follow up: Private Physician; When: 2      

      - 3 days; Reason: Recheck today's complaints, Continuance of care, Re-evaluation by         

      your physician. kb                                                                          

                                                                                                  

**************************************************************************************************

## 2020-05-03 NOTE — XMS REPORT
[facility]

                            Created on:2019



Patient:Lesly Dawson

Sex:Female

:1950

External Reference #:[id number]





Demographics







                          Address                   [address] [place] [id number]

 

                          Phone                     [phone]

 

                          Preferred Language        en

 

                          Marital Status            Unknown

 

                          Christianity Affiliation     Unknown

 

                          Race                      White

 

                          Ethnic Group              Unknown









Author







                          Organization              [employer]









Care Team Providers







                    Name                Role                Phone

 

                    Navarro Bakari      Provider Role       Unavailable









Allergies, Adverse Reactions, Alerts







                    Substance           Reaction            Event Type

 

                    PCN                 Info Not Available  Drug Allergy







Problems







             Problem Type Condition    Code         Onset Dates  Condition Statu

s

 

             Problem      Metabolic syndrome X E88.81                    Active

 

             Problem      Hyperlipidemia E78.5                     Active

 

             Problem      Benign essential HTN I10                       Active

 

             Assessment   Right sciatic nerve pain M54.31                    Act

carmina

 

             Assessment   Primary osteoarthritis of right M17.11                

    Active



                          knee                                   

 

             Assessment   Pain, joint, knee, right M25.561                   Act

carmina

 

             Problem      Primary osteoarthritis of right M17.11                

    Active



                          knee                                   

 

             Problem      Right sciatic nerve pain M54.31                    Act

carmina

 

             Problem      Pain, joint, knee, right M25.561                   Act

carmina

 

             Problem      GERD (gastroesophageal reflux K21.9                   

  Active



                          disease)                               

 

             Problem      Obese        E66.9                     Active

 

             Problem      Seborrheic keratoses L82.1                     Active

 

             Problem      Seasonal allergic rhinitis, J30.2                     

Active



                          unspecified trigger                           







Medications







        Medication Code    Code    Instructions Start   End     Status  Dosage



                System                  Date    Date            

 

        Tums    NDC     83592263923 500 MG Orally                 Active  1 tabl

et



                                Once a day                         

 

        Low-Dose Aspirin NDC     0                               Active  not



                                                                defined

 

        Hydrochlorothiazide NDC     19106277043 25 MG Orally                 Act

carmina  1 tablet



                                Once a day                         in the



                                                                morning

 

        Tessalon Perles NDC     [id number] 100MG                   Active  1 ca

p(s)



                                                                3 times a



                                                                day as



                                                                needed

 

        Metoprolol Succinate NDC     70498251554 25 MG Orally                 Ac

tive  1 tablet



        ER                      Once a day                         

 

        Omeprazole NDC     74336667605 40 MG Orally                 Active  1 ca

psule



                                Once a day                         

 

        Flonase ND     60387068865 50 MCG/ACT                 Active  1 spray



                                Nasally Once a                         in each



                                day                             nostril

 

        Accupril NDC     80966260789 40 MG Orally                 Active  1 tabl

et



                                Once a day                         







Results

No Known Results



Summary Purpose

[employer] Submission

## 2020-05-03 NOTE — XMS REPORT
[facility]

                             Created on:May 1, 2019



Patient:Lesyl Dawson

Sex:Female

:1950

External Reference #:[id number]





Demographics







                          Address                   [address] [place] [address]

 

                          Phone                     [phone]

 

                          Preferred Language        en

 

                          Marital Status            Unknown

 

                          Jain Affiliation     Unknown

 

                          Race                      White

 

                          Ethnic Group              Unknown









Author







                          Organization              [employer]









Care Team Providers







                    Name                Role                Phone

 

                    Bakari Navarro      Provider Role       Unavailable









Allergies, Adverse Reactions, Alerts







                    Substance           Reaction            Event Type

 

                    PCN                 Info Not Available  Drug Allergy







Problems







             Problem Type Condition    Code         Onset Dates  Condition Statu

s

 

             Problem      Hyperlipidemia E78.5                     Active

 

             Problem      GERD (gastroesophageal reflux K21.9                   

  Active



                          disease)                               

 

             Problem      Obese        E66.9                     Active

 

             Problem      Pain, joint, knee, left M25.562                   Acti

ve

 

             Problem      Pain, joint, knee, right M25.561                   Act

carmina

 

             Problem      Primary osteoarthritis of left knee M17.12            

        Active

 

             Problem      Seborrheic keratoses L82.1                     Active

 

             Problem      Seasonal allergic rhinitis, J30.2                     

Active



                          unspecified trigger                           

 

             Problem      Primary osteoarthritis of right M17.11                

    Active



                          knee                                   

 

             Problem      Right sciatic nerve pain M54.31                    Act

carmina

 

             Assessment   Primary osteoarthritis of left knee M17.12            

        Active

 

             Problem      Metabolic syndrome X E88.81                    Active

 

             Assessment   Primary osteoarthritis of right M17.11                

    Active



                          knee                                   

 

             Problem      Benign essential HTN I10                       Active







Medications







        Medication Code    Code    Instructions Start   End     Status  Dosage



                System                  Date    Date            

 

        Hydrochlorothiazide NDC     48688128260 25 MG Orally                 Act

carmina  1 tablet



                                Once a day                         in the



                                                                morning

 

        Low-Dose Aspirin NDC     0                               Active  not



                                                                defined

 

        Tessalon Perles NDC     58935849340 100MG                   Active  1 ca

p(s)



                                                                3 times a



                                                                day as



                                                                needed

 

        Tums    NDC     14126901808 500 MG Orally                 Active  1 tabl

et



                                Once a day                         

 

        Metoprolol Succinate NDC     86656718636 25 MG Orally                 Ac

tive  1 tablet



        ER                      Once a day                         

 

        Accupril NDC     30801967772 40 MG Orally                 Active  1 tabl

et



                                Once a day                         

 

        Flonase NDC     48528495658 50 MCG/ACT                 Active  1 spray



                                Nasally Once a                         in each



                                day                             nostril

 

        Omeprazole NDC     84849188571 40 MG Orally                 Active  1 ca

psule



                                Once a day                         







Results

No Known Results



Summary Purpose

[employer] Submission

## 2020-05-03 NOTE — XMS REPORT
[facility]

                          Created on:2019



Patient:Lesly Dawson

Sex:Female

:1950

External Reference #:[id number]





Demographics







                          Address                   [address] [place] [id number]

 

                          Phone                     [phone]

 

                          Preferred Language        en

 

                          Marital Status            Unknown

 

                          Zoroastrianism Affiliation     Unknown

 

                          Race                      White

 

                          Ethnic Group              Unknown









Author







                          Organization              [employer]









Care Team Providers







                    Name                Role                Phone

 

                    Jc, Na            Provider Role       Unavailable









Allergies, Adverse Reactions, Alerts







                    Substance           Reaction            Event Type

 

                    PCN                 Info Not Available  Drug Allergy







Problems







             Problem Type Condition    Code         Onset Dates  Condition Statu

s

 

             Problem      Metabolic syndrome X E88.81                    Active

 

             Problem      Seborrheic keratoses L82.1                     Active

 

             Problem      Seasonal allergic rhinitis, J30.2                     

Active



                          unspecified trigger                           

 

             Problem      Primary osteoarthritis of left knee M17.12            

        Active

 

             Assessment   Skin tags, multiple acquired L91.8                    

 Active

 

             Problem      Primary osteoarthritis of right M17.11                

    Active



                          knee                                   

 

             Problem      Pain, joint, knee, left M25.562                   Acti

ve

 

             Problem      Encounter for general adult medical Z00.00            

        Active



                          examination without abnormal                          

 



                          findings                               

 

             Problem      Screening for osteoporosis Z13.820                   A

ctive

 

             Problem      Right sciatic nerve pain M54.31                    Act

carmina

 

             Problem      Pain, joint, knee, right M25.561                   Act

carmina

 

             Assessment   Seasonal allergic rhinitis, J30.2                     

Active



                          unspecified trigger                           

 

             Assessment   Hyperlipidemia E78.5                     Active

 

             Assessment   Seborrheic keratoses L82.1                     Active

 

             Assessment   GERD (gastroesophageal reflux K21.9                   

  Active



                          disease)                               

 

             Problem      Obese        E66.9                     Active

 

             Problem      Benign essential HTN I10                       Active

 

             Assessment   Benign essential HTN I10                       Active

 

             Problem      Hyperlipidemia E78.5                     Active

 

             Problem      GERD (gastroesophageal reflux K21.9                   

  Active



                          disease)                               







Medications







        Medication Code    Code    Instructions Start   End     Status  Dosage



                System                  Date    Date            

 

        Accupril NDC     63980546668 40 MG Orally                 Active  1 tabl

et



                                Once a day                         

 

        Metoprolol Succinate NDC     50153460488 25 MG Orally                 Ac

tive  1 tablet



        ER                      Once a day                         

 

        Hydrochlorothiazide NDC     33858834512 25 MG Orally                 Act

carmina  1 tablet



                                Once a day                         in the



                                                                morning

 

        Fish Oil ND     93493272264 1000 MG Orally                 Active  1 ca

psule



                                Once a day                         

 

        Flonase ND     09545524697 50 MCG/ACT                 Active  1 spray



                                Nasally Once a                         in each



                                day                             nostril







Results

No Known Results



Summary Purpose

[employer]ClinicalWorks Submission

## 2022-03-04 LAB — INR BLD: 1.01

## 2022-03-04 NOTE — RAD REPORT
EXAM DESCRIPTION:  RAD - Chest Pa And Lat (2 Views) - 3/4/2022 11:56 am

 

CLINICAL HISTORY:  Pre op pending knee replacement

 

COMPARISON:  No comparisons

 

FINDINGS:  Lines: None.

Lungs: No evidence of edema or pneumonia.

Pleural: No significant pleural effusions or pneumothorax.

Cardiac: The heart size is within normal limits.

Bones: No acute fractures.

Other:

 

IMPRESSION:  No acute cardiopulmonary disease.

## 2022-03-09 ENCOUNTER — HOSPITAL ENCOUNTER (OUTPATIENT)
Dept: HOSPITAL 97 - OR | Age: 72
Setting detail: OBSERVATION
LOS: 3 days | Discharge: HOME HEALTH | End: 2022-03-12
Attending: ORTHOPAEDIC SURGERY | Admitting: ORTHOPAEDIC SURGERY
Payer: COMMERCIAL

## 2022-03-09 VITALS — BODY MASS INDEX: 36 KG/M2

## 2022-03-09 DIAGNOSIS — E78.5: ICD-10-CM

## 2022-03-09 DIAGNOSIS — M17.12: Primary | ICD-10-CM

## 2022-03-09 DIAGNOSIS — E66.9: ICD-10-CM

## 2022-03-09 DIAGNOSIS — Z87.891: ICD-10-CM

## 2022-03-09 DIAGNOSIS — Z20.822: ICD-10-CM

## 2022-03-09 DIAGNOSIS — Z88.0: ICD-10-CM

## 2022-03-09 DIAGNOSIS — I10: ICD-10-CM

## 2022-03-09 DIAGNOSIS — K21.9: ICD-10-CM

## 2022-03-09 LAB — HCT VFR BLD CALC: 39.7 % (ref 36–45)

## 2022-03-09 PROCEDURE — 97116 GAIT TRAINING THERAPY: CPT

## 2022-03-09 PROCEDURE — 85018 HEMOGLOBIN: CPT

## 2022-03-09 PROCEDURE — 88304 TISSUE EXAM BY PATHOLOGIST: CPT

## 2022-03-09 PROCEDURE — 97139 UNLISTED THERAPEUTIC PX: CPT

## 2022-03-09 PROCEDURE — 97110 THERAPEUTIC EXERCISES: CPT

## 2022-03-09 PROCEDURE — 97530 THERAPEUTIC ACTIVITIES: CPT

## 2022-03-09 PROCEDURE — 88305 TISSUE EXAM BY PATHOLOGIST: CPT

## 2022-03-09 PROCEDURE — 85610 PROTHROMBIN TIME: CPT

## 2022-03-09 PROCEDURE — 97161 PT EVAL LOW COMPLEX 20 MIN: CPT

## 2022-03-09 PROCEDURE — 85014 HEMATOCRIT: CPT

## 2022-03-09 PROCEDURE — 0SRC069 REPLACEMENT OF RIGHT KNEE JOINT WITH OXIDIZED ZIRCONIUM ON POLYETHYLENE SYNTHETIC SUBSTITUTE, CEMENTED, OPEN APPROACH: ICD-10-PCS

## 2022-03-09 PROCEDURE — 73560 X-RAY EXAM OF KNEE 1 OR 2: CPT

## 2022-03-09 PROCEDURE — 27447 TOTAL KNEE ARTHROPLASTY: CPT

## 2022-03-09 PROCEDURE — 85730 THROMBOPLASTIN TIME PARTIAL: CPT

## 2022-03-09 PROCEDURE — 88311 DECALCIFY TISSUE: CPT

## 2022-03-09 PROCEDURE — 94010 BREATHING CAPACITY TEST: CPT

## 2022-03-09 PROCEDURE — 36415 COLL VENOUS BLD VENIPUNCTURE: CPT

## 2022-03-09 PROCEDURE — 71046 X-RAY EXAM CHEST 2 VIEWS: CPT

## 2022-03-09 RX ADMIN — SODIUM CHLORIDE SCH MLS: 9 INJECTION, SOLUTION INTRAVENOUS at 16:07

## 2022-03-09 RX ADMIN — METOPROLOL SUCCINATE SCH MG: 100 TABLET, EXTENDED RELEASE ORAL at 21:11

## 2022-03-09 NOTE — RAD REPORT
EXAM DESCRIPTION:  RAD - Knee Left 2 View - 3/9/2022 11:14 am

 

CLINICAL HISTORY:  Knee surgery

 

FINDINGS:  Postoperative changes of a left knee arthroplasty.

 

Prosthesis is in good position.

 

No fracture or dislocation

## 2022-03-09 NOTE — P.OP
Preoperative diagnosis: left knee osteoarthritis


Postoperative diagnosis: same


Primary procedure: left total knee arthroplasty


Anesthesia: general


Estimated blood loss: 20 cc


Specimen: left knee bone remnants


Findings: see dictation


Operative Technique: 





Indication For Procedure: Lesly is a 71 year-old male presenting to my clinic 

with signs, symptoms and x-ray findings consistent with severe left knee 

osteoarthritis.  I discussed with the patient at length risks and benefits 

associated with operative and nonoperative treatment.  She had failed 

conservative treatment measures and had significant difficulties with ADLs 

secondary to her pain.  We discussed operative treatment and elected to proceed 

with left total knee arthroplasty.  She expressed understanding and elected to 

proceed with operative treatment.





Description Of Procedure:  After informed consent was obtained, the patient was 

identified in the preoperative holding area.  The left lower extremity was 

marked.  The patient was then taken to the PACU where he underwent a leftt lower

extremity adductor canal block performed by Anesthesia.  She was then taken to 

the operating room, transferred to the operating table in supine fashion, and 

placed under general anesthesia.  The left lower extremity was then prepped and 

draped in usual sterile fashion.  A time-out was initiated.  The correct patient

and procedure were confirmed and identified.  The patient did receive her 

preoperative prophylactic antibiotics.  The left lower extremity was then 

exsanguinated and tourniquet was inflated to 300 mmHg.  Approximately 15 cm 

longitudinal incision was made centered over the anterior aspect of the left 

knee.  Dissection was then taken to the extensor mechanism and a medial 

parapatellar arthrotomy was performed.  The patella was everted and dislocated 

laterally and the knee was flexed in the fat pad.  Medial lateral meniscus and 

ACL were all excised exposing the distal femur.  Excess hypertrophic synovium 

was also excised within the suprapatellar pouch.  The patient had an MRI of her 

left knee preoperatively for surgical planning and creation of cutting blocks.  

The cutting block was then placed over the distal femur and pins were then 

placed.  The distal femoral cutting block was then placed over the pins.  Knee 

joint was then used to ensure proper depth cut and the distal femur was then 

cut.  The chamfer cutting guide was then placed over the distal end of the 

femur.  Anterior, posterior cuts as well as anterior and posterior chamfer cuts 

were then made again confirming proper depth of the cut using an Dane wing.  

Excess bone remnants were then sent to pathology for further evaluation.  Next, 

attention was taken to the proximal tibia.  A tibial jig and tibial cutting 

block was then placed on proximal aspect of the left tibia and locked into 

position.  Pins were then placed and alignment guide was then used to confirm 

proper alignment of the cut and then coronal and sagittal planes.  Once this was

confirmed, the cutting jig was placed over the pins and the proximal tibia was 

cut.    Sizing trays were then selected and size 10 mm spacer was used and there

was good overall balance in flexion and extension.  Next, the trial implants 

were then placed using the size 9 narrow CR femur and a size E tibia with an 10 

mm MC poly.  There was overall good range of motion and good stability.  The 

trial implants were then removed.  This improved the overall stability of the 

knee and components.  The wound was then irrigated thoroughly with normal saline

and the knee was then injected with 30 cc of 0.5% Marcaine both in the posterior

capsule and mediallateral gutters as well as quadriceps tendon and periosteum.  

The tibia was then punched.  The femur was drilled.  The cement was then 

prepared on the back table.  Cement was then placed first on the tibial surface 

followed by size E tibia.  Excess cement was removed with Shapleigh elevators.  Size

9 narrow CR femur was then placed on the distal femur after cement was placed on

the distal femur.  Excess cement was then removed and a size 10 mm MC trial poly

was then placed.  The knee was held in extension as the cement hardened.  

Undersurface of the patella was prepared debriding osteophytes using rongeurs as

well as osteophytes..  Cement was placed on the undersurface of the patella 

after it was cut and a size 29 patella was placed.  Once the cement was 

hardened, the knee was ranged, there was good overall stability both in flexion,

extension and as well as stability with varus and valgus stresses.  Trial poly 

was then removed and a size 10 mm MC poly was then placed and locked into 

position.  The knee was then ranged again.  There was good overall range of 

motion both for flexion and extension with good stability. The wound was then 

irrigated again thoroughly with normal saline using pulse lavage.  Tourniquet 

was let down.  Hemostasis was achieved using Bovie electrocautery.  Extensor 

mechanism was then approximated using a #1 Vicryl both in interrupted and 

running fashion.  The fascia was then approximated using 0 Vicryl.  Subcutaneous

tissue was approximated with a 2-0 Vicryl.  Skin was approximated using staples.

 Sterile dressings were applied.  The patient was awakened and transferred back 

in stable condition


Complications: None


Implants: Biomet Nick 9 Narrow CR femur, E tibia, 10 MC poly, 29 patella


Fluids & blood products: per anesthesia record; TT: 76 mins @ 300 mmHg


Transferred to: Recovery Room


Condition: Good

## 2022-03-09 NOTE — XMS REPORT
Continuity of Care Document

                            Created on:2022



Patient:Lesly Dawson

Sex:Female

:1950

External Reference #:[id number]





Demographics







                          Address                   [address] [place] [address]

 

                          Home Phone                [phone]

 

                          Mobile Phone              [phone]

 

                          Preferred Language        en

 

                          Marital Status            Unknown

 

                          Sikhism Affiliation     Unknown

 

                          Race                      Unknown

 

                          Additional Race(s)        White

 

                          Ethnic Group              Unknown









Author







                          Organization              [facility]

t

 

                          Address                   [address]



                                                    [place] [address]

 

                          Phone                     [phone]









Support







                Name            Relationship    Address         Phone

 

                Tricia        Child           Unavailable     [phone]

 

                Samir           Unavailable     [address]  [phone]



                                                [place] [address] 









Care Team Providers







                    Name                Role                Phone

 

                    Ying Jc           Primary Care Physician [phone]

 

                    IRINA Jc            Attending Clinician Unavailable

 

                    Liang MEJIA              Attending Clinician [phone]

 

                    Doctor Unassigned,  Name Attending Clinician Unavailable

 

                    Vanessa-Mbayo_A_AH    Attending Clinician Unavailable

 

                    Vanessa-Mbayo_A_AH    Admitting Clinician Unavailable









Payers







           Payer Name Policy Type Policy Number Effective Date Expiration Date S

garrett

 

           [facility] [place] -            [id number]  2020            



           [employer]                        00:00:00              



           (MEDICARE                                              



           REPLACEMENT/ADVANT                                             



           AGE - HMO)                                             







Problems







       Condition Condition Condition Status Onset  Resolution Last   Treating Co

mments 

Source



       Name   Details Category        Date   Date   Treatment Clinician        



                                                 Date                 

 

       No known No known Disease                                           Unive

rs



       active active                                                  ity of



       problems problems                                                  [place]



                                                                      Medical



                                                                      [place]







Allergies, Adverse Reactions, Alerts







       Allergy Allergy Status Severity Reaction(s) Onset  Inactive Treating Comm

ents 

Source



       Name   Type                        Date   Date   Clinician        

 

       Penicill Propensi Active        Rash   2021                      Un[facility]

s



       in     ty to                                               ity of



              adverse                      00:00:                      [place]



              reaction                      00                          Medical



              s                                                       Branch

 

       PCN    Adverse Active        Info Not                             [facility]



              Reaction               Available                             [facility]



                                                                      [facility]



                                                                      l



                                                                      Outpati



                                                                      ent



                                                                      Clinics







Social History







           Social Habit Start Date Stop Date  Quantity   Comments   Source

 

           Exposure to                       Not sure              [facility]

 [place]



           SARS-CoV-2 (event)                                             Medica

l Branch

 

           Tobacco use and 2021 Never used            [facility]



           exposure   00:00:00   00:00:00                         Medical Branch

 

           Sex Assigned At [birth year]                       [facility]



           Birth      00:00:00   00:00:00                         [facility]









                Smoking Status  Start Date      Stop Date       Source

 

                Unknown if ever smoked                                 [facility]

 

                Former smoker   2021 00:00:00 2021 00:00:00 [facility]







Medications







       Ordered Filled Start  Stop   Current Ordering Indication Dosage Frequency

 Signature

                    Comments            Components          Source



     Medication Medication Date Date Medication? Clinician                (SIG) 

          



     Name Name                                                   

 

     No known      2021      No                                      Univers



     medications      2-29                                              ity of



               15:29:                                              [address]

 

     No known      2021      No                                      [facility]



     medications      2-29                                              ity of



               15:29:                                              [address]

 

     No known      2021      No                                      [facility]



     medications      2-29                                              ity of



               15:29:                                              [address]

 

     No known      2021      No                                      Univers



     medications      2-29                                              ity of



               15:29:                                              [address]

 

     No known      2021      No                                      [facility]



     medications      2-29                                              ity of



               15:29:                                              [address]

 

     PredniSONE PredniSONE 2020-0 2020- No   Na Jc                one tablet  

         [facility]



               7-15                           with food           [facility]



               00:00: 00:00                                         [facility]



               00   [facility]00                                          [facility]



                                                                 Clinics

 

     Metoprolol Metoprolol           Yes  Na Jc                1 tablet       

    [facility]



     Succinate Succinate                                                   [facility]

 -



     ER   ER                                                     [facility]



                                                                 l



                                                                 Outpati



                                                                 ent



                                                                 Clinics

 

     Flonase Flonase           Yes  Na Jc                1 spray in           

[facility]



                                                  each           kes -



                                                  nostril           [facility]



                                                                 l



                                                                 Outpati



                                                                 ent



                                                                 [facility]

 

     Hydrochloro Hydrochloro           Yes  Na Jc                1 tablet     

      [facility]



     thiazide thiazide                                    in the           [facility]

 -



                                                  morning           [facility]



                                                                 l



                                                                 Outpati



                                                                 ent



                                                                 [facility]

 

     Fish Oil Fish Oil           Yes  Na Jc                1 capsule          

 [facility]



                                                                 [facility]



                                                                 [facility]



                                                                 l



                                                                 Outpati



                                                                 ent



                                                                 Clinics

 

     Accupril Accupril           Yes  Na Jc                1 tablet           

[facility]



                                                                 [facility]



                                                                 [facility]



                                                                 l



                                                                 Outpati



                                                                 ent



                                                                 Clinics







Vital Signs







             Vital Name   Observation Time Observation Value Comments     Source

 

             Systolic blood 2021 21:01:00 134 mm[Hg]                [facility]

 

             Diastolic blood 2021 21:01:00 84 mm[Hg]                 Unive

[facility]

 

             Heart rate   2021 21:01:00 84 /min                   [facility]

 

             Body height  2021 21:01:00 160 cm                    [facility]

 

             Body weight  2021 21:01:00 86.183 kg                 [facility]

 

             BMI          2021 21:01:00 33.66 kg/m2               [facility]

 

             Oxygen saturation 2021 21:01:00 95 /min                   [facility]



             in Arterial blood                                        Medical Br

anch



             by Pulse oximetry                                        







Procedures







                Procedure       Date / Time     Performing Clinician Source



                                Performed                       

 

                MEDICAL         2022 06:01:00 Doctor Unassigned, No [facility]



                RELEASE/CLEARANCE FORMS                 [facility]

 

                AUTHORIZATION FOR 2022 06:01:00 Doctor Unassigned, No Univ

[facility]



                RELEASE OF PHI                  Name            [facility]

 

                EKG-12 LEAD     2021 21:08:22 Liang [facility] o

f [facility]

 

                EXTERNAL PROVIDER - ADC 2021 06:01:00 Doctor Unassigned, N

o [facility]



                REFERRAL                        [facility]







Encounters







        Start   End     Encounter Admission Attending Care    Care    Encounter 

Source



        Date/Time Date/Time Type    Type    Clinicians Facility Department ID   

   

 

        2022         Outpatient         Jc, Na [facility]  [facility]  [id number]

[address] [facility]



        14:23:00                                                    [facility]



                                                                        l



                                                                        Outpati



                                                                        ent



                                                                        Clinics

 

        2022-02-15         Outpatient         Jc, Na [facility]  [facility]  [id number]

[address] [facility]



        08:54:02                                                    [facility]



                                                                        [facility]



                                                                        l



                                                                        Outpati



                                                                        ent



                                                                        Clinics

 

        2022         Outpatient         Jc, Na [facility]  [facility]  [id number]

[address] [facility]



        13:45:16                                                 [id number]   [facility]



                                                                        l



                                                                        Outpati



                                                                        ent



                                                                        Clinics

 

        2022         Outpatient         Jc, Na [facility]  [facility]  [id number]

[address] [facility]



        12:49:56                                                 [id number]   [facility]



                                                                        [facility]



                                                                        l



                                                                        Outpati



                                                                        ent



                                                                        Clinics

 

        2022         Outpatient         Jc, Na [facility]  [facility]  [id number]

[address] [facility]



        12:08:12                                                 [id number]   [facility]



                                                                        l



                                                                        Outpati



                                                                        ent



                                                                        Clinics

 

        2022         Outpatient         Jc, Na [facility]  ST[facility]  [id number]

[address] [facility]



        12:07:42                                                 [id number]   [facility]



                                                                        l



                                                                        Outpati



                                                                        ent



                                                                        Clinics

 

        2022         Outpatient         Jc, Na [facility]  ST[facility]  [id number]

[address] [facility]



        11:54:04                                                 [id number]   [facility]



                                                                        [facility]



                                                                        l



                                                                        Outpati



                                                                        ent



                                                                        Clinics

 

        2022         Outpatient         Jc, Na STLC  STLC  [id number]

[address] [facility]



        11:53:42                                                 [id number]   [facility]



                                                                        l



                                                                        Outpati



                                                                        ent



                                                                        Clinics

 

        2022         Outpatient         Jc, Na STLC  STLMLC  [id number]

[address] [facility]



        11:53:11                                                 [id number]   [facility]



                                                                        l



                                                                        Outpati



                                                                        ent



                                                                        Clinics

 

        2022         [facility]  [id number]

[address] [facility]



        11:25:47                                                 [id number]   [facility]



                                                                        [facility]



                                                                        l



                                                                        Outpati



                                                                        ent



                                                                        Clinics

 

        2022 Telephone         Liang,    [facility]    [id number]

[address] [facility]



        00:00:00 00:00:00                 [place] .1.13.10         

ity of



                                                [place] [id number]         [place]

[place]



                                                [place] [phone]         [address]                 

 

        2022 ambulatory                 [facility]  STLMLC  [id number]

 [facility]



        00:00:00 00:00:00                                                 [facility]



                                                                        [facility]



                                                                        l



                                                                        Outpati



                                                                        ent



                                                                        Clinics

 

        2022 ambulatory                 STLMLC  STLMLC  [id number]

 [facility]



        00:00:00 00:00:00                                                 [facility] 

[facility]



                                                                        [facility]



                                                                        l



                                                                        Outpati



                                                                        ent



                                                                        Clinics

 

        2022 ambulatory                 STLMLC  STLMLC  [id number]

 [facility]



        00:00:00 00:00:00                                                 [facility] 

[facility]



                                                                        [facility]



                                                                        l



                                                                        Outpati



                                                                        ent



                                                                        Clinics

 

        2022 Orders          Doctor GALEAS    [id number]

96 [facility]



        00:00:00 00:00:00 Only            Unassigned, KENZIE   [id number]       

  ity of



                                        [facility] [id number]         [place]

as



                                                        [phone]         [address]

 

        2022 ambulatory                 STLMLC  STLMLC  [id number]

 [facility]



        00:00:00 00:00:00                                                 [facility]



                                                                        [facility]



                                                                        l



                                                                        Outpati



                                                                        ent



                                                                        Clinics

 

        2022 Orders          Doctor ADAL [id number]

82 [facility]



        00:00:00 00:00:00 Only            Unassigned, KENZIE   [id number]       

  ity of



                                        [facility] [id number]         [place]

as



                                                        [phone]         [address]

 

        2022 ambulatory                 STLMLC  STLMLC  [id number]

 [facility]



        00:00:00 00:00:00                                                 [facility] 

[facility]



                                                                        [facility]



                                                                        l



                                                                        Outpati



                                                                        ent



                                                                        Clinics

 

        2022 ambulatory                 STLMLC  STLMLC  [id number]

 [facility]



        00:00:00 00:00:00                                                 [facility]



                                                                        [facility]



                                                                        l



                                                                        Outpati



                                                                        ent



                                                                        Clinics

 

        2022 ambulatory                 STLMLC  STLMLC  [id number]

 [facility]



        00:00:00 00:00:00                                                 [facility] 

[facility]



                                                                        [facility]



                                                                        l



                                                                        Outpati



                                                                        ent



                                                                        Clinics

 

        2021 Office          Liang,    [facility]    [id number]

03 [facility]



        14:40:00 15:22:14 Visit           [place] .1.13.10         

ity of



                                                [place] [id number]         [place]

[place]



                                                [place] [phone]         [place]

[place] [id number]             [facility]                 

 

        2021 Orders          Doctor  ADAL    [id number]

93 [facility]



        00:00:00 00:00:00 Only            Unassigned, KENZIE   [id number]       

  ity of



                                        [place] [facility] [id number]         [place]

as



                                                        [phone]         [address]

 

        2021 ambulatory                 STLMLC  STLMLC  [id number]

 [facility]



        00:00:00 00:00:00                                                 [facility] 

[facility]



                                                                        [facility]



                                                                        l



                                                                        Outpati



                                                                        ent



                                                                        Clinics

 

        2021 ambulatory                 STLMLC  STLMLC  [id number]

 [facility]



        00:[facility]00 [facility]00[facility]00                                                 [facility]



                                                                        [facility]



                                                                        Outpati



                                                                        ent



                                                                        Clinics

 

        2021 ambulatory                 STLMLC  STLMLC  [id number]

 [facility]



        00:00[facility]00 00[facility]00[facility]00                                                 [facility]



                                                                        l



                                                                        Outpati



                                                                        ent



                                                                        Clinics

 

        2021 ambulatory                 STLMLC  STLMLC  [id number]

 [facility]



        00:00:00 00[facility]00:00                                                 [facility]



                                                                        l



                                                                        Outpati



                                                                        ent



                                                                        Clinics

 

        2021 ambulatory                 STLMLC  STLMLC  [id number]

 [facility]



        00:00:00 00[facility]00:00                                                 [facility]



                                                                        l



                                                                        Outpati



                                                                        ent



                                                                        Clinics

 

        2021 ambulatory                 STLMLC  STLMLC  [id number]

 [facility]



        00:00:00 00[facility]00:00                                                 [facility]



                                                                        l



                                                                        Outpati



                                                                        ent



                                                                        Clinics

 

        2021 ambulatory                 STLMLC  STLMLC  [id number]

 [facility]



        00:00:00 00[facility]00:00                                                 [facility]



                                                                        [facility]



                                                                        Outpati



                                                                        ent



                                                                        Clinics

 

        2021 Outpatient                 STLMLC  STLMLC  [id number]

 [facility]



        00:00:00 00[facility]00:00                                                 [facility]



                                                                        [facility]



                                                                        Outpati



                                                                        ent



                                                                        Clinics

 

        2021 Outpatient                 STLMLC  STLMLC  [id number]

 [facility]



        00:00:00 00[facility]00:00                                                 [facility]



                                                                        l



                                                                        Outpati



                                                                        ent



                                                                        Clinics

 

        2021 Outpatient                 STLMLC  STLMLC  [id number]

 [facility]



        00:00[facility]00 00[facility]00:00                                                 [facility]



                                                                        l



                                                                        Outpati



                                                                        ent



                                                                        Clinics

 

        2020 Outpatient                 STLMLC  STLMLC  [id number]

 [facility]



        00:00:00 00[facility]00:00                                                 [facility]



                                                                        l



                                                                        Outpati



                                                                        ent



                                                                        Clinics

 

        2020-12-10 2020-12-10 Outpatient                 STLMLC  STLMLC  [id number]

 [facility]



        00:00:00 00[facility]00:00                                                 [facility]



                                                                        l



                                                                        Outpati



                                                                        ent



                                                                        Clinics

 

        2020 Outpatient                 STLMLC  STLMLC  [id number]

 [facility]



        00:00:00 00:00:00                                                 [facility]



                                                                        l



                                                                        Outpati



                                                                        ent



                                                                        Clinics

 

        2020 Outpatient                 STLMLC  STLMLC  [id number]

 [facility]



        00:00:00 00:00:00                                                 [facility]



                                                                        l



                                                                        Outpati



                                                                        ent



                                                                        Clinics

 

        2020-10-12 2020-10-12 Outpatient                 STLMLC  STLMLC  [id number]

 [facility]



        00:00:00 00:00:00                                                 [facility]



                                                                        l



                                                                        Outpati



                                                                        ent



                                                                        Clinics

 

        2020-09-15 2020-09-15 Outpatient                 STLM  STLC  [id number]

 [facility]



        00:00:00 00:00:00                                                 [facility]



                                                                        l



                                                                        Outpati



                                                                        ent



                                                                        Clinics

 

        2020-07-15 2020-07-15 Outpatient                 [facility]

[id number] [facility]



        08:41:00 08:41:00                         t [facility]   [facility]

s [employer]   [facility]         l



                                                Medicine                 Outpati



                                                                        ent



                                                                        Clinics

 

        2020 Outpatient                 [facility]

[id number] [facility]



        11:00:00 11:00:00                         t [facility]   [facility]

s -



                                                [employer]   [facility]  [facility]         l



                                                Medicine                 Outpati



                                                                        ent



                                                                        Clinics

 

        2020 Outpatient                 [facility]

[id number] [facility]



        16:26:00 16:26:00                         t [facility]   [facility]

s [employer]   [facility]         l



                                                Medicine                 Outpati



                                                                        ent



                                                                        Clinics

 

        2020 Outpatient                 [facility]

[id number] [facility]



        16:01:00 16:01:00                         t [facility]   [facility]

s [employer]   [facility]         l



                                                Medicine                 Outpati



                                                                        ent



                                                                        Clinics

 

        2020 Outpatient                 [facility]

[id number] [facility]



        10:20:00 10:20:00                         t [facility]   [facility]

s [employer]   [facility]         l



                                                Medicine                 Outpati



                                                                        ent



                                                                        Clinics

 

        2020 Outpatient                 [facility]

[id number] [facility]



        10:15:00 10:15:00                         t [employer]

s [employer]   [facility]  [facility]         l



                                                Medicine                 Outpati



                                                                        ent



                                                                        Clinics

 

        2020 Outpatient                 [facility]

[id number] [facility]



        10:40:00 10:40:00                         t [facility]   [facility]

s [employer]   [facility]         l



                                                Medicine                 Outpati



                                                                        ent



                                                                        Clinics

 

        2020 Outpatient                 [facility]

[id number] [facility]



        11:20:00 11:20:00                         t [facility]   [facility]

s [employer]   [facility]         l



                                                Medicine                 Outpati



                                                                        ent



                                                                        Clinics

 

        2020 Outpatient                 [facility]

[id number] [facility]



        11:45:00 11:45:00                         t [facility]   [facility]

s [employer]   [facility]         l



                                                Medicine                 Outpati



                                                                        ent



                                                                        Clinics

 

        2020 Outpatient                 [facility]

[id number] [facility]



        10:23:00 10:23:00                         t [facility]   [facility]

s -



                                                Drive   [facility]                 Outpati



                                                                        ent



                                                                        Clinics

 

        2020 Outpatient                 [facility]

[id number] [facility]



        15:38:00 15:38:00                         t [facility]   [facility]

s -



                                                Drive   [facility]                 Outpati



                                                                        ent



                                                                        Clinics

 

        2020 Outpatient         [facility]     [address]

[address] [place] [address]



        07:23:00 07:23:00                 _A_AH                   [id number]   Family



                                                                        Practic



                                                                        e

 

        2020 Outpatient                 [facility]

[id number] [facility]



        14:22:00 14:22:00                         t       Specialty/U         Katarzyna

kes -



                                                Specialty rology          [facility]

a



                                                /[facility]          l



                                                Clinic                  Outpati



                                                                        ent



                                                                        Clinics

 

        2019 Outpatient                 [facility]

[id number] [facility]



        09:00:00 09:00:00                         t [facility]   [facility]

s -



                                                Drive   [facility]                 Outpati



                                                                        ent



                                                                        Clinics

 

        2019 Outpatient                 [facility] [address]

[id number] [facility]



        12:18:00 12:18:00                         t [facility]   [facility]

s -



                                                Drive   [facility]                 Outpati



                                                                        ent



                                                                        Clinics

 

        2019 Outpatient                 [facility] [address]

[id number] [facility]



        13:00:00 13:00:00                         t [facility]   [facility]

s -



                                                Drive   [facility]                 Outpati



                                                                        ent



                                                                        Clinics

 

        2019 Outpatient                 [facility] [address]

[id number] [facility]



        09:22:00 09:22:00                         t [facility]   [facility]

s -



                                                Drive   [facility]                 Outpati



                                                                        ent



                                                                        Clinics

 

        2019 Outpatient                 [facility]

[id number] [facility]



        13:30:00 13:30:00                         t Bone  Bone and         [facility]

 -



                                                and Joint Joint           [facility]

a



                                                [facility]         ent



                                                                        Clinics

 

        2019 Outpatient                 [facility]

[id number] [facility]



        14:00:00 14:00:00                         t Bone  Bone and         [facility]

 -



                                                and [facility] Joint           [facility]

a



                                                [facility]         ent



                                                                        Clinics

 

        2019 Outpatient                 [facility]

[id number] [facility]



        11:42:00 11:42:00                         t Bone  Bone and         [facility]

 -



                                                and Joint Joint           [facility]

a



                                                [facility] of [facility]         [facility]         ent



                                                                        Clinics

 

        2019 Outpatient                 [facility] [address]

[id number] [facility]



        11:00:00 11:00:00                         t Bone  Bone and         [facility]

 -



                                                and [facility] Joint           [facility]

a



                                                [facility]         [facility]         ent



                                                                        [facility]

 

        2019-04-15 2019-04-15 Outpatient                 [facility]

[id number] [facility]



        16:04:00 16:04:00                         t [employer]         [employer] [facility]

 

        2019 Outpatient                 [facility]

[id number] [facility]



        15:41:00 15:41:00                         t Bone  Bone and         [facility]

 -



                                                and Joint Joint           [facility]

a



                                                Clinic of [facility]         [facility]         ent



                                                                        Clinics

 

        2019 Outpatient                 [facility]

[id number] [facility]



        09:30:00 09:30:00                         t Bone  Bone and         [facility]

 -



                                                and Joint Joint           [facility]

a



                                                [facility]         [facility]         ent



                                                                        Clinics

 

        2019 Outpatient                 [facility] [address]

[id number] [facility]



        14:19:00 14:19:00                         t Bone  Bone and         [facility]

 -



                                                and Joint Joint           [facility]

a



                                                [facility]         [facility]         ent



                                                                        [facility]

 

        2019 Outpatient                 [facility]

[id number] [facility]



        16:44:00 16:44:00                         t [employer] [facility]







Results

This patient has no known results.

## 2022-03-09 NOTE — P.BOP
Preoperative diagnosis: left knee osteoarthritis


Postoperative diagnosis: same


Primary procedure: left total knee arthroplasty


Assistant: NONE,NONE


Estimated blood loss: 20 cc


Specimen: left knee bone remnants


Anesthesia: General


Complications: None


Implants: [employer] Person[employer] 9 Narrow CR femur, E tibia, 29 patella, 10 MC 

poly


Fluids & blood products: per anesthesia record; TT: 76 mins @ 300 mmHg


Transferred to: Recovery Room


Condition: Good

## 2022-03-10 LAB — HCT VFR BLD CALC: 37.2 % (ref 36–45)

## 2022-03-10 RX ADMIN — Medication SCH: at 08:38

## 2022-03-10 RX ADMIN — SODIUM CHLORIDE SCH MLS: 9 INJECTION, SOLUTION INTRAVENOUS at 01:00

## 2022-03-10 RX ADMIN — CELECOXIB SCH MG: 100 CAPSULE ORAL at 08:36

## 2022-03-10 RX ADMIN — ENOXAPARIN SODIUM SCH MG: 30 INJECTION SUBCUTANEOUS at 05:44

## 2022-03-10 RX ADMIN — HYDROCODONE BITARTRATE AND ACETAMINOPHEN PRN TAB: 7.5; 325 TABLET ORAL at 01:46

## 2022-03-10 RX ADMIN — CHOLECALCIFEROL TAB 25 MCG (1000 UNIT) SCH UNIT: 25 TAB at 08:37

## 2022-03-10 RX ADMIN — HYDROCODONE BITARTRATE AND ACETAMINOPHEN PRN TAB: 7.5; 325 TABLET ORAL at 16:04

## 2022-03-10 RX ADMIN — SODIUM CHLORIDE SCH: 9 INJECTION, SOLUTION INTRAVENOUS at 08:39

## 2022-03-10 RX ADMIN — AMLODIPINE BESYLATE SCH MG: 5 TABLET ORAL at 08:38

## 2022-03-10 RX ADMIN — METOPROLOL SUCCINATE SCH MG: 100 TABLET, EXTENDED RELEASE ORAL at 20:20

## 2022-03-10 RX ADMIN — ENOXAPARIN SODIUM SCH MG: 30 INJECTION SUBCUTANEOUS at 17:06

## 2022-03-10 RX ADMIN — Medication SCH: at 08:39

## 2022-03-10 RX ADMIN — HYDROCODONE BITARTRATE AND ACETAMINOPHEN PRN TAB: 7.5; 325 TABLET ORAL at 10:56

## 2022-03-10 RX ADMIN — Medication SCH TAB: at 08:38

## 2022-03-10 NOTE — P.PN
Subjective


Date of Service: 03/10/22


Chief Complaint: s/p L TKA


Subjective: Improving, Working w/ PT





Physical Examination





- Vital Signs


Temperature: 97.8 F


Blood Pressure: 146/67


Pulse: 70


Respirations: 22


Pulse Ox (%): 98





- Physical Exam


General: Alert, In no apparent distress


Musculoskeletal: Other (LLE: dressing c/d/i; no swelling; +EHL/FHL/GSC/TA; 

sensation grossly intact distally)





- Studies


Laboratory Data (last 24 hrs)





03/10/22 05:02: Hgb 12.3, Hct 37.2








Assessment And Plan





- Plan





Sosa is a 71-year-old female status post left total knee arthroplasty


-continue with PT to mobilize


-patient lives alone and will need help as she gains her independence; await 

insurance approval for IPR


-lovenox for DVt prophylaxis

## 2022-03-11 VITALS — OXYGEN SATURATION: 99 %

## 2022-03-11 RX ADMIN — HYDROCODONE BITARTRATE AND ACETAMINOPHEN PRN TAB: 7.5; 325 TABLET ORAL at 23:52

## 2022-03-11 RX ADMIN — HYDROCODONE BITARTRATE AND ACETAMINOPHEN PRN TAB: 7.5; 325 TABLET ORAL at 03:34

## 2022-03-11 RX ADMIN — ENOXAPARIN SODIUM SCH MG: 30 INJECTION SUBCUTANEOUS at 17:28

## 2022-03-11 RX ADMIN — AMLODIPINE BESYLATE SCH MG: 5 TABLET ORAL at 08:08

## 2022-03-11 RX ADMIN — CELECOXIB SCH MG: 100 CAPSULE ORAL at 08:06

## 2022-03-11 RX ADMIN — CHOLECALCIFEROL TAB 25 MCG (1000 UNIT) SCH UNIT: 25 TAB at 08:07

## 2022-03-11 RX ADMIN — METOPROLOL SUCCINATE SCH MG: 100 TABLET, EXTENDED RELEASE ORAL at 20:08

## 2022-03-11 RX ADMIN — HYDROCODONE BITARTRATE AND ACETAMINOPHEN PRN TAB: 7.5; 325 TABLET ORAL at 20:08

## 2022-03-11 RX ADMIN — Medication SCH: at 08:09

## 2022-03-11 RX ADMIN — Medication SCH TAB: at 08:08

## 2022-03-11 RX ADMIN — ENOXAPARIN SODIUM SCH MG: 30 INJECTION SUBCUTANEOUS at 06:03

## 2022-03-11 RX ADMIN — HYDROCODONE BITARTRATE AND ACETAMINOPHEN PRN TAB: 7.5; 325 TABLET ORAL at 09:20

## 2022-03-11 NOTE — P.PN
Subjective


Date of Service: 03/11/22


Chief Complaint: s/p L TKA


Subjective: Ambulating, Improving, Working w/ PT





Physical Examination





- Vital Signs


Temperature: 97.2 F


Blood Pressure: 143/77


Pulse: 62


Respirations: 16


Pulse Ox (%): 94





- Physical Exam


General: Alert, In no apparent distress


Musculoskeletal: Other (LLE: dressing c/d/i; no swelling; - Bud's; 

+EHL/FHL/GSC/TA; sensation grossly intact distally)





Assessment And Plan





- Plan





Sosa is a 71-year-old female status post left total knee arthroplasty POD #2


-continue with PT to mobilize; progressing well


-patient lives alone and will need help as she gains her independence; await 

insurance approval for IPR


-lovenox for DVt prophylaxis

## 2022-03-12 VITALS — TEMPERATURE: 97.5 F | DIASTOLIC BLOOD PRESSURE: 63 MMHG | SYSTOLIC BLOOD PRESSURE: 133 MMHG

## 2022-03-12 RX ADMIN — HYDROCODONE BITARTRATE AND ACETAMINOPHEN PRN TAB: 7.5; 325 TABLET ORAL at 13:37

## 2022-03-12 RX ADMIN — CHOLECALCIFEROL TAB 25 MCG (1000 UNIT) SCH UNIT: 25 TAB at 08:50

## 2022-03-12 RX ADMIN — AMLODIPINE BESYLATE SCH MG: 5 TABLET ORAL at 08:47

## 2022-03-12 RX ADMIN — HYDROCODONE BITARTRATE AND ACETAMINOPHEN PRN TAB: 7.5; 325 TABLET ORAL at 08:49

## 2022-03-12 RX ADMIN — CELECOXIB SCH MG: 100 CAPSULE ORAL at 08:49

## 2022-03-12 RX ADMIN — ENOXAPARIN SODIUM SCH MG: 30 INJECTION SUBCUTANEOUS at 05:38

## 2022-03-12 RX ADMIN — Medication SCH: at 09:00

## 2022-03-12 RX ADMIN — Medication SCH TAB: at 08:49

## 2022-03-14 NOTE — P.DS
Admission Date: 03/09/22


Discharge Date: 03/12/22


Disposition: DC HOME/HOME HEALTH CARE


Discharge Condition: GOOD


Reason for Admission: s/p L TKA


Procedures: 





left TKA on 3/9/33


Brief History of Present Illness: 





Lesly is a 72 yo female s/p left total knee arthroplasty on 3/9/2022 and was 

admitted to the floor in stable condition.


Hospital Course: 


Lesly was admitted to the floor after left total knee arthroplasty on March 19 5022.  She was in stable condition.  Physical therapy was consulted to aid with 

mobilization.  Orders were sent for possible admission to inpatient rehab

ilitation at the patient lives alone and stated that she needed help.  Patient 

mobilized with physical therapy however she reported continued difficulty with 

mobilization and feeling unsafe to be discharged.  Over the course of her 

admission her vital signs remained stable and she improved with physical 

therapy.  I discussed the patient possible discharge on Saturday, March 12 and 

the patient felt ready and safe to be discharged home at that time.  She stated 

she will continue to wait for possible approval to inpatient rehab.  She was on 

Lovenox while in the hospital and was discharged with Xarelto.  She will follow-

up in 2 weeks for reevaluation and staple removal.





Vital Signs/Physical Exam: 














Temp Pulse Resp BP Pulse Ox


 


 97.5 F   75   18   133/63   100 


 


 03/12/22 12:00  03/12/22 12:00  03/12/22 13:37  03/12/22 12:00  03/12/22 13:37








Laboratory Data at Discharge: 














Hgb  12.3 g/dL (12.0-15.0)   03/10/22  05:02    


 


Hct  37.2 % (36.0-45.0)   03/10/22  05:02    


 


PT  11.6 SECONDS (9.5-12.5)   03/04/22  10:58    


 


INR  1.01   03/04/22  10:58    


 


APTT  33.7 SECONDS (24.3-36.9)   03/04/22  10:58    








Home Medications: 








Amlodipine Besylate 5 mg PO DAILY 03/04/22 


Ascorbic Acid [Vitamin C] 500 mg PO DAILY 03/04/22 


Benzonatate [Tessalon Perle*] 100 mg PO TIDP PRN 03/04/22 


Calcium Carb/Magnesium Hydrox [Antacid 1000-200 mg Tab Chew] 1 each PO DAILY 

03/04/22 


Chlorpheniramine Maleate [Chlortabs] 4 mg PO DAILY 03/04/22 


Cholecalciferol (Vitamin D3) [Vitamin D3] 2,000 unit PO DAILY 03/04/22 


Cran/C/B.coag/Fos/L.acid/L.rha [Probiotic Plus & Cranberry Cap] 1 tab PO DAILY 

03/04/22 


Enzymes,Digestive [Enzyme Digest] 1 each PO DAILY 03/04/22 


Metoprolol Succinate 100 mg PO BEDTIME 03/04/22 


Multivit-Min/Iron/Folic/Lutein [Centrum Silver Women Tablet] 1 each PO DAILY 

03/04/22 


Omega-3 Fatty Acids [Omega-3] 1,000 mg PO DAILY 03/04/22 


Quinapril HCl 40 mg PO DAILY 03/04/22 


Red Yeast Rice 600 mg PO DAILY 03/04/22 


Ubidecarenone [Co Q-10] 100 mg PO DAILY 03/04/22 


Vitamin B Complex [Vitamin B Complex*] 1 cap PO DAILY 03/04/22 


Vitamin E 1,000 unit PO DAILY 03/04/22 


Zinc 50 mg PO DAILY 03/04/22 


hydroCHLOROthiazide [Hydrodiuril*] 25 mg PO DAILY 03/04/22 


Hydrocodone 7.5/APAP 325 [Norco 7.5/325 mg*] 1 tab PO Q4H PRN  tab 03/12/22 





Diet: Regular


Activity: Weight bearing as tolerated


Followup: 


Kendell Oneal MD [ACTIVE - CAN ADMIT] - 1-2 Weeks (Please call for an 

appointment)

## 2022-07-01 LAB
BUN BLD-MCNC: 22 MG/DL (ref 7–18)
GLUCOSE SERPLBLD-MCNC: 100 MG/DL (ref 74–106)
HCT VFR BLD CALC: 40.7 % (ref 36–45)
INR BLD: 1.03
LYMPHOCYTES # SPEC AUTO: 2.3 K/UL (ref 0.7–4.9)
MCV RBC: 88.5 FL (ref 80–100)
PMV BLD: 7.8 FL (ref 7.6–11.3)
POTASSIUM SERPL-SCNC: 3.3 MMOL/L (ref 3.5–5.1)
RBC # BLD: 4.6 M/UL (ref 3.86–4.86)

## 2022-07-06 ENCOUNTER — HOSPITAL ENCOUNTER (OUTPATIENT)
Dept: HOSPITAL 97 - OR | Age: 72
Setting detail: OBSERVATION
LOS: 1 days | Discharge: HOME HEALTH | End: 2022-07-07
Attending: ORTHOPAEDIC SURGERY | Admitting: ORTHOPAEDIC SURGERY
Payer: COMMERCIAL

## 2022-07-06 VITALS — BODY MASS INDEX: 33.1 KG/M2

## 2022-07-06 DIAGNOSIS — M17.11: Primary | ICD-10-CM

## 2022-07-06 DIAGNOSIS — Z88.0: ICD-10-CM

## 2022-07-06 DIAGNOSIS — Z20.822: ICD-10-CM

## 2022-07-06 LAB — HCT VFR BLD CALC: 38.1 % (ref 36–45)

## 2022-07-06 PROCEDURE — 97116 GAIT TRAINING THERAPY: CPT

## 2022-07-06 PROCEDURE — 36415 COLL VENOUS BLD VENIPUNCTURE: CPT

## 2022-07-06 PROCEDURE — 97139 UNLISTED THERAPEUTIC PX: CPT

## 2022-07-06 PROCEDURE — 85014 HEMATOCRIT: CPT

## 2022-07-06 PROCEDURE — 82947 ASSAY GLUCOSE BLOOD QUANT: CPT

## 2022-07-06 PROCEDURE — 97530 THERAPEUTIC ACTIVITIES: CPT

## 2022-07-06 PROCEDURE — 27447 TOTAL KNEE ARTHROPLASTY: CPT

## 2022-07-06 PROCEDURE — 94010 BREATHING CAPACITY TEST: CPT

## 2022-07-06 PROCEDURE — 80048 BASIC METABOLIC PNL TOTAL CA: CPT

## 2022-07-06 PROCEDURE — 85610 PROTHROMBIN TIME: CPT

## 2022-07-06 PROCEDURE — 85025 COMPLETE CBC W/AUTO DIFF WBC: CPT

## 2022-07-06 PROCEDURE — 97161 PT EVAL LOW COMPLEX 20 MIN: CPT

## 2022-07-06 PROCEDURE — 87811 SARS-COV-2 COVID19 W/OPTIC: CPT

## 2022-07-06 PROCEDURE — 88311 DECALCIFY TISSUE: CPT

## 2022-07-06 PROCEDURE — 88304 TISSUE EXAM BY PATHOLOGIST: CPT

## 2022-07-06 PROCEDURE — 85018 HEMOGLOBIN: CPT

## 2022-07-06 PROCEDURE — 73560 X-RAY EXAM OF KNEE 1 OR 2: CPT

## 2022-07-06 PROCEDURE — 85730 THROMBOPLASTIN TIME PARTIAL: CPT

## 2022-07-06 PROCEDURE — 0SRC069 REPLACEMENT OF RIGHT KNEE JOINT WITH OXIDIZED ZIRCONIUM ON POLYETHYLENE SYNTHETIC SUBSTITUTE, CEMENTED, OPEN APPROACH: ICD-10-PCS

## 2022-07-06 NOTE — RAD REPORT
EXAM DESCRIPTION:  RAD - Knee Right 2 View - 7/6/2022 11:59 am

 

CLINICAL HISTORY:  Right knee surgery

 

FINDINGS:  Right knee arthroplasty has been performed. Prosthesis is in good position.

 

No fracture or dislocation

## 2022-07-06 NOTE — P.BOP
Preoperative diagnosis: right knee osteoarthritis


Postoperative diagnosis: same


Primary procedure: right total knee arthroplasty


Assistant: NONE,NONE


Estimated blood loss: 20 cc


Specimen: right knee bone remnants


Findings: see dictation


Anesthesia: General


Complications: None


Implants: Biomet Nick 8 Narrow CR femur, E tibia, 29 patella, 10 CR poly


Fluids & blood products: per anesthesia record; TT: 90 mins @ 300 mmHg


Transferred to: Recovery Room


Condition: Good

## 2022-07-06 NOTE — P.OP
Preoperative diagnosis: right knee osteoarthritis


Postoperative diagnosis: same


Primary procedure: right total knee arthroplasty


Anesthesia: general


Estimated blood loss: 20 cc


Specimen: right knee bone remnants


Findings: see dictation


Operative Technique: 





Indication For Procedure: Lesly is an 71 year-old male presenting to my clinic

with signs, symptoms and x-ray findings consistent with right knee 

osteoarthritis.  I discussed with the patient at length risks and benefits 

associated with operative and nonoperative treatment.  She had failed 

conservative treatment measures including corticosteroid injections, 

viscosupplementation injections and had significant difficulties with ADLs 

secondary to her pain.  We discussed operative treatment and elected to proceed 

with right total knee arthroplasty.  She expressed understanding and elected to 

proceed with operative treatment.





Description Of Procedure:  After informed consent was obtained, the patient was 

identified in the preoperative holding area.  The right lower extremity was 

marked.  The patient was then taken to the PACU where she underwent a right 

lower extremity adductor canal block performed by Anesthesia.  She was then 

taken to the operating room, transferred to the operating table in supine 

fashion, and placed under general anesthesia.  Her right lower extremity was 

then prepped and draped in usual sterile fashion.  A time-out was initiated.  

The correct patient and procedure were confirmed and identified.  The patient 

did receive her preoperative prophylactic antibiotics.  The right lower 

extremity was then exsanguinated and tourniquet was inflated to 300 mmHg.  

Approximately 15 cm longitudinal incision was made centered over the anterior 

aspect of the right knee.  Dissection was then taken to the extensor mechanism 

and a medial parapatellar arthrotomy was performed.  The patella was everted and

dislocated laterally with some difficulty and the knee was flexed and the fat 

pad was excised.  Medial meniscus, lateral meniscus and ACL were all excised 

exposing the distal femur.  Excess hypertrophic synovium was also excised within

the suprapatellar pouch.  The patient had an MRI of her right knee 

preoperatively for surgical planning and creation of cutting blocks.  The 

cutting block was then placed over the distal femur and pins were then placed.  

The distal femoral cutting block was then placed over the pins.  Knee joint was 

then used to ensure proper depth cut and the distal femur was then cut.  The 

chamfer cutting guide was then placed over the distal end of the femur.  

Anterior, posterior cuts as well as anterior and posterior chamfer cuts were 

then made again confirming proper depth of the cut using an Dane wing.  Excess 

bone remnants were then sent to pathology for further evaluation.  Next, 

attention was taken to the proximal tibia.  A tibial jig and tibial cutting 

block was then placed on proximal aspect of the tibia and locked into position. 

Pins were then placed and alignment guide was then used to confirm proper 

alignment of the cut and then coronal and sagittal planes.  Once this was 

confirmed, the cutting jig was placed over the pins and the proximal tibia was 

cut.    Sizing trays were then selected and size 10 mm spacer was used.  After 

this was completed there was good overall balance in flexion and extension with 

the 10 mm spacer.  Next, the trial implants were then placed using the size 8 

standard CR femur and a size E tibia with an 10 mm poly.  There was overall good

range of motion and good stability trial implants were then removed.  The wound 

was then irrigated thoroughly with normal saline and the knee was then injected 

with 30 cc of 0.5% Marcaine both in the posterior capsule and medial and lateral

gutters as well as quadriceps tendon and periosteum.  The tibia was then punched

and marked after the patella button was placed.  The femur was drilled.  The 

cement was then prepared on the back table.  Cement was then placed first on the

tibial surface followed by size E tibia.  Excess cement was removed with [place] 

elevators.  Size 8 narrow CR femur was then placed on the distal femur after 

cement was placed on the distal femur.  Excess cement was then removed and a 

size 10 mm trial poly was then placed.  The knee was held in extension as the 

cement hardened.  Undersurface of the patella was prepared debriding osteophytes

using rongeurs as well as osteophytes had been debrided off the proximal tibia 

with rongeurs.  Cement was placed on the undersurface of the patella after it 

was cut and a size 29 patella was placed.  Once the cement was hardened, the 

knee was ranged, there was good overall stability both in flexion, extension and

as well as stability with varus and valgus stresses.  Trial poly was then remove

d and a size 10 mm CR poly was then placed and locked into position.  The knee 

was then ranged again.  There was good overall range of motion both for flexion 

and extension with good stability. The wound was then irrigated again thoroughly

with normal saline using pulse lavage.  Tourniquet was let down.  Hemostasis was

achieved using Bovie electrocautery.  Extensor mechanism was then approximated 

using a #1 Vicryl both in interrupted and running fashion.  The fascia was then 

approximated using 0 Vicryl.  Subcutaneous tissue was approximated with a 2-0 

Vicryl.  Skin was approximated using staples.  Sterile dressings were applied.  

The patient was awakened and transferred back in stable condition


Complications: None


Implants: Biomet Nick Persona 8 Narrow femur, E tibia, 10 CR poly, 29 patella


Fluids & blood products: per anesthesia record; TT: 90 mins @ 300 mmHg


Transferred to: Recovery Room


Condition: Good

## 2022-07-07 VITALS — DIASTOLIC BLOOD PRESSURE: 63 MMHG | SYSTOLIC BLOOD PRESSURE: 129 MMHG

## 2022-07-07 VITALS — TEMPERATURE: 97.3 F

## 2022-07-07 VITALS — OXYGEN SATURATION: 97 %

## 2022-07-07 LAB — HCT VFR BLD CALC: 35.5 % (ref 36–45)

## 2022-07-07 NOTE — P.DS
Admission Date: 07/06/22


Discharge Date: 07/07/22


Disposition: DC HOME/HOME HEALTH CARE


Discharge Condition: GOOD


Reason for Admission: s/p R TKA


Consultations: 





none


Procedures: 





right total knee arthroplasty 7/6/2022


Brief History of Present Illness: 





Lesly is a 71-year-old female who underwent right total knee arthroplasty on 

July 6, 2022 and was admitted to the floor in stable condition.


Hospital Course: 





Lesly underwent right total knee arthroplasty on July 6, 2022 without 

complication.  She was admitted to floor in stable condition.  Physical therapy 

was consulted to aid with mobilization.  Her vital signs remained stable while 

in the hospital.  She was given Lovenox for DVT prophylaxis while in the 

hospital.  She was discharged on the morning of July 7, 2022 in stable 

condition.  She was discharged with Xarelto for DVT prophylaxis.  She will 

follow-up in 2 weeks for reevaluation and staple removal.


Vital Signs/Physical Exam: 














Temp Pulse Resp BP Pulse Ox


 


 97.3 F   68   16   129/63   97 


 


 07/07/22 08:00  07/07/22 09:21  07/07/22 08:00  07/07/22 09:21  07/07/22 08:00








Laboratory Data at Discharge: 














WBC  8.3 K/uL (4.3-10.9)   07/01/22  09:27    


 


Hgb  11.8 g/dL (12.0-15.0)  L  07/07/22  03:45    


 


Hct  35.5 % (36.0-45.0)  L  07/07/22  03:45    


 


Plt Count  313 K/uL (152-406)   07/01/22  09:27    


 


PT  11.3 SECONDS (9.5-12.5)   07/01/22  09:27    


 


INR  1.03   07/01/22  09:27    


 


APTT  29.4 SECONDS (24.3-36.9)   07/01/22  09:27    


 


Sodium  139 mmol/L (136-145)   07/01/22  09:27    


 


Potassium  3.3 mmol/L (3.5-5.1)  L  07/01/22  09:27    


 


BUN  22 mg/dL (7-18)  H  07/01/22  09:27    


 


Creatinine  1.01 mg/dL (0.55-1.3)   07/01/22  09:27    


 


Glucose  100 mg/dL ()   07/01/22  09:27    








Home Medications: 








Amlodipine Besylate 5 mg PO DAILY 03/04/22 


Ascorbic Acid [Vitamin C] 500 mg PO DAILY 03/04/22 


Benzonatate [Tessalon Perle*] 100 mg PO TIDP PRN 03/04/22 


Calcium Carb/Magnesium Hydrox [Antacid 1000-200 mg Tab Chew] 1 each PO DAILY 

03/04/22 


Chlorpheniramine Maleate [Chlortabs] 4 mg PO DAILY 03/04/22 


Cholecalciferol (Vitamin D3) [Vitamin D3] 2,000 unit PO DAILY 03/04/22 


Cran/C/B.coag/Fos/L.acid/L.rha [Probiotic Plus & Cranberry Cap] 1 tab PO DAILY 

03/04/22 


Enzymes,Digestive [Enzyme Digest] 1 each PO DAILY 03/04/22 


Metoprolol Succinate 100 mg PO BEDTIME 03/04/22 


Multivit-Min/Iron/Folic/Lutein [Centrum Silver Women Tablet] 1 each PO DAILY 

03/04/22 


Omega-3 Fatty Acids [Omega-3] 1,000 mg PO DAILY 03/04/22 


Quinapril HCl 40 mg PO DAILY 03/04/22 


Red Yeast Rice 600 mg PO DAILY 03/04/22 


Ubidecarenone [Co Q-10] 100 mg PO DAILY 03/04/22 


Vitamin B Complex [Vitamin B Complex*] 1 cap PO DAILY 03/04/22 


Vitamin E 1,000 unit PO DAILY 03/04/22 


Zinc 50 mg PO DAILY 03/04/22 


hydroCHLOROthiazide [Hydrodiuril*] 25 mg PO DAILY 03/04/22 


Hydrocodone 7.5/APAP 325 [Norco 7.5/325 mg*] 1 tab PO Q4H PRN  tab 07/07/22 





Physician Discharge Instructions: 


Keep dressing clean and dry.  Begin Xarelto tomorrow morning, July 8, with 

breakfast and take once daily.  Wear bilateral thigh high MASSIEL hose for 2 weeks.


Diet: Regular


Activity: Weight bearing as tolerated


Followup: 


Shakira Jc DO [Primary Care Provider] - 1-2 Weeks


Kendell Oneal MD [ACTIVE - CAN ADMIT] - 1-2 Weeks

## 2023-02-17 ENCOUNTER — HOSPITAL ENCOUNTER (EMERGENCY)
Dept: HOSPITAL 97 - ER | Age: 73
Discharge: HOME | End: 2023-02-17
Payer: COMMERCIAL

## 2023-02-17 VITALS — OXYGEN SATURATION: 98 % | DIASTOLIC BLOOD PRESSURE: 57 MMHG | SYSTOLIC BLOOD PRESSURE: 135 MMHG

## 2023-02-17 VITALS — TEMPERATURE: 97.8 F

## 2023-02-17 DIAGNOSIS — J06.9: Primary | ICD-10-CM

## 2023-02-17 DIAGNOSIS — Z20.822: ICD-10-CM

## 2023-02-17 LAB
ALBUMIN SERPL BCP-MCNC: 3.5 G/DL (ref 3.4–5)
ALP SERPL-CCNC: 59 U/L (ref 45–117)
ALT SERPL W P-5'-P-CCNC: 25 U/L (ref 13–56)
AST SERPL W P-5'-P-CCNC: 17 U/L (ref 15–37)
BUN BLD-MCNC: 14 MG/DL (ref 7–18)
GLUCOSE SERPLBLD-MCNC: 151 MG/DL (ref 74–106)
HCT VFR BLD CALC: 40.3 % (ref 36–45)
LYMPHOCYTES # SPEC AUTO: 1.6 K/UL (ref 0.7–4.9)
MAGNESIUM SERPL-MCNC: 1.8 MG/DL (ref 1.6–2.4)
MCV RBC: 91.1 FL (ref 80–100)
NT-PROBNP SERPL-MCNC: 189 PG/ML (ref ?–125)
PMV BLD: 7.3 FL (ref 7.6–11.3)
POTASSIUM SERPL-SCNC: 3.9 MMOL/L (ref 3.5–5.1)
RBC # BLD: 4.43 M/UL (ref 3.86–4.86)
SARS-COV-2 RNA RESP QL NAA+PROBE: NEGATIVE
TROPONIN I SERPL HS-MCNC: 6.9 PG/ML (ref ?–58.9)

## 2023-02-17 PROCEDURE — 83880 ASSAY OF NATRIURETIC PEPTIDE: CPT

## 2023-02-17 PROCEDURE — 96375 TX/PRO/DX INJ NEW DRUG ADDON: CPT

## 2023-02-17 PROCEDURE — 93005 ELECTROCARDIOGRAM TRACING: CPT

## 2023-02-17 PROCEDURE — 99285 EMERGENCY DEPT VISIT HI MDM: CPT

## 2023-02-17 PROCEDURE — 83735 ASSAY OF MAGNESIUM: CPT

## 2023-02-17 PROCEDURE — 84484 ASSAY OF TROPONIN QUANT: CPT

## 2023-02-17 PROCEDURE — 36415 COLL VENOUS BLD VENIPUNCTURE: CPT

## 2023-02-17 PROCEDURE — 80076 HEPATIC FUNCTION PANEL: CPT

## 2023-02-17 PROCEDURE — 71045 X-RAY EXAM CHEST 1 VIEW: CPT

## 2023-02-17 PROCEDURE — 94640 AIRWAY INHALATION TREATMENT: CPT

## 2023-02-17 PROCEDURE — 80048 BASIC METABOLIC PNL TOTAL CA: CPT

## 2023-02-17 PROCEDURE — 96365 THER/PROPH/DIAG IV INF INIT: CPT

## 2023-02-17 PROCEDURE — 0240U: CPT

## 2023-02-17 PROCEDURE — 96366 THER/PROPH/DIAG IV INF ADDON: CPT

## 2023-02-17 PROCEDURE — 85379 FIBRIN DEGRADATION QUANT: CPT

## 2023-02-17 PROCEDURE — 85025 COMPLETE CBC W/AUTO DIFF WBC: CPT

## 2023-02-17 PROCEDURE — 71275 CT ANGIOGRAPHY CHEST: CPT

## 2023-02-17 NOTE — EDPHYS
Physician Documentation                                                                           

 [facility]                                                                 

Name: Lesly Dawson                                                                               

Age: 72 yrs                                                                                       

Sex: Female                                                                                       

: 1950                                                                                   

MRN: [id number]                                                                                   

Arrival Date: 2023                                                                          

Time: 10:42                                                                                       

Account#: [id number]                                                                            

Bed 14                                                                                            

Private MD:                                                                                       

ED Physician Adolfo Nuno                                                                     

HPI:                                                                                              

                                                                                             

10:54 This 72 yrs old Female presents to ER via Unassigned with complaints of Chest           sb4 

      Congestion, Cough.                                                                          

10:54 Patient is a 72 year old female with hypertension who presents with complaints of       sb4 

      shortness of breath, cough, congestion x 1 month. She states that she saw her PCP a few     

      weeks ago and was prescribed steroids, an albuterol inhaler, antibiotics, and               

      antitussives. She reports that her symptoms initially improved but have now worsened.       

      States the wheezing wakes her up at night. Denies any fever, nausea, vomiting,              

      diarrhea, sick contacts..                                                                   

                                                                                                  

Historical:                                                                                       

- Allergies:                                                                                      

11:03 PENICILLINS;                                                                            ph  

- PMHx:                                                                                           

11:03 Hypertension;                                                                           ph  

- PSHx:                                                                                           

11:03 Appendectomy; Total abdominal hysterectomy; knee replacement, bilateral;                ph  

                                                                                                  

- Immunization history:: Adult Immunizations up to date.                                          

- Social history:: Smoking status: Patient/guardian denies using tobacco, but has a               

  distant history of tobacco abuse.                                                               

                                                                                                  

                                                                                                  

ROS:                                                                                              

10:54 Constitutional: Negative for fever, chills, and weight loss, Eyes: Negative for injury, sb4 

      pain, redness, and discharge, Cardiovascular: Negative for chest pain, palpitations,        

      and edema, Abdomen/GI: Negative for abdominal pain, nausea, vomiting, diarrhea, and         

      constipation, Back: Negative for injury and pain, MS/Extremity: Negative for injury and     

      deformity, Skin: Negative for injury, rash, and discoloration.                              

10:54 ENT: Positive for nasal discharge, rhinorrhea, sinus congestion, Negative for sore          

      throat, difficulty swallowing, difficulty handling secretions.                              

11:54 Respiratory: Positive for cough, with yellow sputum, shortness of breath, wheezing.     sb4 

                                                                                                  

Exam:                                                                                             

10:54 Constitutional:  This is a well developed, well nourished patient who is awake, alert,  sb4 

      and in no acute distress. Head/Face:  Normocephalic, atraumatic. Eyes:  Extra-ocular        

      motions intact.  Periorbital areas with no swelling, redness, or edema. ENT:  Mucous        

      membranes moist. Cardiovascular:  Regular rate and rhythm with a normal S1 and S2.          

      Abdomen/GI:  Soft, non-tender, no distension. Back:  No spinal tenderness.  No              

      costovertebral tenderness.  Full range of motion. MS/ Extremity:  Pulses equal, no          

      cyanosis.  Neurovascular intact.  Full, normal range of motion.                             

10:54 Respiratory: mild respiratory distress is noted, Breath sounds: wheezing: expiratory is     

      heard diffusely.                                                                            

11:21 ECG was reviewed by the Attending Physician.                                            sb4 

                                                                                                  

Vital Signs:                                                                                      

11:01  / 86; Pulse 84; Resp 22; Temp 97.8(O); Pulse Ox 98% on R/A; Weight 84.37 kg;     ph  

      Height 5 ft. 2 in. (157.48 cm);                                                             

11:54  / 57; Pulse 69; Pulse Ox 100% on R/A;                                            ap3 

12:38  / 57; Pulse 78; Pulse Ox 98% on R/A;                                             ap3 

11:01 Body Mass Index 34.02 (84.37 kg, 157.48 cm)                                             ph  

                                                                                                  

MDM:                                                                                              

10:53 Patient medically screened.                                                             sb4 

10:54 Differential diagnosis: viral Infection, bacterial infection, URI, bronchitis,          sb4 

      pneumonia.                                                                                  

13:44 Data reviewed: vital signs, nurses notes, lab test result(s), EKG, radiologic studies,  sb4 

      CT scan, plain films, I have discussed the patient's presentation/case with the             

      attending Emergency Department Physician;. Data reviewed: and as a result, I will           

      discharge patient. Consideration of Admission/Observation Escalation of care including      

      admission/observation considered. I considered the following discharge prescriptions or     

      medication management in the emergency department Antibiotics: At this time antibiotics     

      are not recommended, Antivirals: At this time, antivirals are not recommended. Care         

      significantly affected by the following chronic conditions: Hypertension.                   

                                                                                                  

                                                                                             

11:00 Order name: Basic Metabolic Panel; Complete Time: 11:56                                 EDMS

                                                                                             

11:00 Order name: CBC with Automated Diff; Complete Time: 11:42                               EDMS

                                                                                             

11:00 Order name: D-Dimer; Complete Time: 12:33                                               EDMS

                                                                                             

11:00 Order name: Liver (Hepatic) Function; Complete Time: 11:56                              EDMS

                                                                                             

11:00 Order name: Magnesium; Complete Time: 12:05                                             EDMS

                                                                                             

11:00 Order name: NT PRO-BNP; Complete Time: 12:05                                            EDMS

                                                                                             

10:54 Order name: EKG; Complete Time: 11:44                                                   sb4 

                                                                                             

10:54 Order name: Cardiac monitoring; Complete Time: 11:32                                    sb4 

                                                                                             

10:54 Order name: EKG - Nurse/Tech; Complete Time: 11:32                                      sb4 

                                                                                             

10:54 Order name: IV Saline Lock; Complete Time: 11:32                                        sb4 

                                                                                             

10:54 Order name: Labs collected and sent; Complete Time: 11:32                               sb4 

                                                                                             

11:02 Order name: EKG Electrocardiogram; Complete Time: 11:22                                 EDMS

                                                                                             

11:02 Order name: COVID-19/FLU A+B; Complete Time: 12:58                                      EDMS

                                                                                             

11:02 Order name: Troponin High Sensitivity; Complete Time: 11:56                             EDMS

                                                                                             

11:02 Order name: Chest Single View; Complete Time: 12:07                                     EDMS

                                                                                             

12:32 Order name: Chest For PE Angio CT; Complete Time: 13:39                                 sb4 

                                                                                             

10:54 Order name: O2 Per Protocol; Complete Time: 11:32                                       sb4 

                                                                                             

10:54 Order name: O2 Sat Monitoring; Complete Time: 11:32                                     sb4 

                                                                                                  

EC:21 Rate is 72 beats/min. Rhythm is regular, Normal Sinus Rhythm. QRS [place] is Normal. CO    sb4 

      interval is normal at 174 msec. QRS interval is normal at 106 msec. QT interval is          

      normal at 424 msec.                                                                         

                                                                                                  

Administered Medications:                                                                         

11:00 CANCELLED (Other Intervention Used): Albuterol 1.25 mg Inhalation once                  sb4 

11:00 CANCELLED (Other Intervention Used): AtroVENT (ipratropium) Aerosol 0.5 mg Inhalation   sb4 

      once; Every 20 min for a total of 3 treatments x3                                           

11:32 Drug: Albuterol - atroVENT (ipratropium) (3:1) (2.5 mg - 0.5 mg) 3 ml Route: Nebulizer; ap3 

14:02 Follow up: Response: No adverse reaction                                                ap3 

11:32 Drug: SOLU-Medrol (methylPrednisoLONE) 125 mg Route: IVP; Site: right antecubital;      ap3 

14:01 Follow up: Response: No adverse reaction                                                ap3 

11:32 Drug: Magnesium Sulfate 1 grams Route: IVPB; Infused Over: 1 hrs; Site: right           ap3 

      antecubital;                                                                                

14:01 Follow up: IV Status: Completed infusion; IV Intake: 100ml                              ap3 

                                                                                                  

                                                                                                  

Disposition:                                                                                      

14:49 Co-signature as Attending Physician, Adolfo Nuno MD I reviewed the patient's care   rt  

      provided by the Advanced Practice Provider and agree with the diagnosis and treatment       

      plan.                                                                                       

                                                                                                  

Disposition Summary:                                                                              

23 13:46                                                                                    

Discharge Ordered                                                                                 

      Location: Home                                                                          sb4 

      Problem: an ongoing problem                                                             sb4 

      Symptoms: have improved                                                                 sb4 

      Condition: Stable                                                                       sb4 

      Diagnosis                                                                                   

        - Acute upper respiratory infection, unspecified                                      sb4 

      Followup:                                                                               sb4 

        - With: Shakira Jc MD                                                                       

        - When: As needed                                                                          

        - Reason: Recheck today's complaints, Continuance of care, Re-evaluation by your           

      physician                                                                                   

      Discharge Instructions:                                                                     

        - Discharge Summary Sheet                                                             sb4 

        - Upper Respiratory Infection, Adult                                                  sb4 

        - How to Use a Nebulizer, Adult                                                       sb4 

      Forms:                                                                                      

        - Medication Reconciliation Form                                                      sb4 

        - Thank You Letter                                                                    sb4 

        - Antibiotic Education                                                                sb4 

        - Prescription Opioid Use                                                             sb4 

      Prescriptions:                                                                              

        - promethazine-DM                                                                          

            - take 5 milliliter by ORAL route every 4-6 hours; 1 Pint; Refills: 0, Product    sb4 

      Selection Permitted                                                                         

        - Prednisone 20 mg Oral Tablet                                                             

            - take 1 tablet by ORAL route once daily for 5 days; 5 tablet; Refills: 0,        sb4 

      Product Selection Permitted                                                                 

        - Albuterol Sulfate 2.5 mg /3 mL (0.083 %) Inhalation Solution for Nebulization            

            - inhale 1 unit by NEBULIZATION route every 8 hours As needed; 1 box; Refills: 0, sb4 

      Product Selection Permitted                                                                 

Signatures:                                                                                       

Dispatcher [employer]                           EDMS                                                 

Emy Roldan RN RN ph Prokisch, Amanda, RN RN   ap3                                                  

Jenifer Olivas, PAPARAMJIT                     PACarrieC sb4                                                  

Adolfo Nuno MD MD   rt                                                   

                                                                                                  

Corrections: (The following items were deleted from the chart)                                    

11:00 10:54 Albuterol 1.25 mg Inhalation once ordered. sb4                                    sb4 

11:00 10:54 AtroVENT (ipratropium) Aerosol 0.5 mg Inhalation once; Every 20 min for a total   sb4 

      of 3 treatments x3 ordered. sb4                                                             

11:50 11:44 Chest Single View+[email or web address] ordered. EDMS                                       EDMS

11:54 10:54 Respiratory: Positive for cough, with yellow sputum, dyspnea on exertion,         sb4 

      shortness of breath, wheezing, expiratory, of the , sb4                                     

11:57 11:21 Rate is 72 beats/min. Rhythm is regular, Normal Sinus Rhythm. QRS Axis is Normal. sb4 

      CO interval is normal at 174 msec. QRS interval is normal at 106 msec. QT interval is       

      normal at 424 msec. sb4                                                                     

                                                                                                  

**************************************************************************************************

## 2023-02-17 NOTE — RAD REPORT
EXAM DESCRIPTION:  RAD - Chest Single View - 2/17/2023 11:37 am

 

CLINICAL HISTORY:  chest congestion

 

COMPARISON:  Chest Pa And Lat (2 Views) dated 3/4/2022

 

FINDINGS:  Lines: None.

Lungs: No evidence of edema or pneumonia.

Pleural: No significant pleural effusions or pneumothorax.

Cardiac: The heart size is within normal limits.

Mediastinum: Within normal limits.

Bones: No acute fractures.

Other: None

 

IMPRESSION:  No acute cardiopulmonary disease.

## 2023-02-17 NOTE — RAD REPORT
EXAM DESCRIPTION:  CT - Chest For Pe Angio - 2/17/2023 12:49 pm

 

CLINICAL HISTORY:  Dyspnea. Weakness under vomiting. Recently recovered from corporate.

 

TECHNIQUE:  Dynamically enhanced axial 3 mm thick images of the chest were obtained during administra
tion of 95 mL Isovue 370 IV contrast. Coronal and oblique reconstruction images were generated and re
viewed. Exam utilizes a protocol for optimal evaluation of pulmonary arterial tree.

 

All CT scans are performed using dose optimization technique as appropriate and may include automated
 exposure control or mA/KV adjustment according to patient size.

 

 

FINDINGS:  Comparison made to chest radiographs dated 02/17/2023.

 

Pulmonary arteries are normal in caliber. No emboli or other suspicious finding. No acute or signific
ant aortic findings. Mild atherosclerotic calcifications along the arch.

 

No mass or infiltrate in the lung parenchyma. No pleural thickening or pleural effusion. No pneumotho
rax.

 

No abnormal mediastinal or hilar masses or lymphadenopathy seen. No chest wall mass or abnormal axill
iary lymphadenopathy.

 

 

IMPRESSION:  No pulmonary embolus.

 

No other acute intrathoracic findings.

## 2023-02-17 NOTE — XMS REPORT
Continuity of Care Document

                          Created on:2023



Patient:BRENT BARRERA

Sex:Female

:1950

External Reference #:[id number]





Demographics







                          Address                   [address] [place] [address]

 

                          Home Phone                [phone]

 

                          Work Phone                [phone]

 

                          Mobile Phone              [phone]

 

                          Email Address             [email or web address]

 

                          Preferred Language        English

 

                          Marital Status            Unknown

 

                          Jewish Affiliation     Unknown

 

                          Race                      Unknown

 

                          Additional Race(s)        Unavailable



                                                    White

 

                          Ethnic Group              Unknown









Author







                          Organization              [facility]

t

 

                          Address                   [address]



                                                    [place] [address]

 

                          Phone                     [phone]









Support







                Name            Relationship    Address         Phone

 

                DEANNA HO            Unavailable     [phone]

 

                Brent Barrera  Unavailable     [address] [facility]  [phone]



                                                [place] [address] 









Care Team Providers







                    Name                Role                Phone

 

                    Asked, No Pcp       Primary Care Physician Unavailable

 

                    Ilda Duvall      Attending Clinician Unavailable

 

                    Shakira BALES          Attending Clinician Unavailable

 

                    [id number]              Attending Clinician Unavailable

 

                    DANYELL NGUYEN        Attending Clinician Unavailable

 

                    Hector Michaud MD    Attending Clinician [phone]

 

                    Doctor Unassigned, No Name Attending Clinician Unavailable

 

                    HECTOR MICHAUD       Attending Clinician Unavailable

 

                    Vanessa-Mbayo_A_AH    Attending Clinician Unavailable

 

                    [id number]              Admitting Clinician Unavailable

 

                    HECTOR MICHAUD       Admitting Clinician Unavailable

 

                    Vanessa-Mbayo_A_AH    Admitting Clinician Unavailable









Payers







           Payer Name Policy Type Policy Number Effective Date Expiration Date S

garrett

 

           [facility] PPO 5          [id number]  2023            



                                            00:00:00              

 

           [facility] TXP 7          [id number]  2023            



           CLASSIC NO                       00:00:00              



           PREMIUM R2T                                             

 

           [place] [address]         [id number]  2019            [facility]

t



                                            00:00:00              - [place] [address]         [id number]  2019            [facility]

t



                                            00:00:00              - [place] [address]         [id number]  2019            [facility]

t



                                            00:00:00              - [place] [address]         [id number]  2019            [facility]

t



                                            00:00:00              - [place] [address]         [id number]  2019            [facility]

t



                                            00:00:00              [facility]

 

           [employer]   C1         [id number]  2019            [facility]



                                            00:00:00              [facility]

 

           [employer] [place]            [id number]  2020            



           - [place]                       00:00:00              



           (MEDICARE                                              



           REPLACEMENT/ADV                                             



           ANTAGE - HMO)                                             







Problems







       Condition Condition Condition Status Onset  Resolution Last   Treating Co

mments 

Source



       Name   Details Category        Date   Date   Treatment Clinician        



                                                 Date                 

 

       [id number] Right  Problem                                           Common



              sciatic                                                  [facility]



              nerve pain                                                  [facility]

 

       [id number] Primary Problem                                           Comm

on



         osteoarthr                                                  [facility]



              itis of                                                  [facility]



              right knee                                                  [facility]

 

       [id number] Chronic Problem                                           Common



              fatigue                                                  [facility]

 

       [id number] Adult BMI Problem                                           Com

mon



              35.0-35.9                                                  [facility]



              kg/sq [facility]

 

       [id number] Seborrheic Problem                                           Co

mmon



              keratoses                                                  [facility]

 

       [id number] Pain,  Problem                                           Common



              joint,                                                  [facility]



              knee, left                                                  [facility]

 

       [id number] Primary Problem                                           Comm

on



         osteoarthr                                                  [facility]



              itis of                                                   [facility]



              left knee                                                  [facility]

 

       [id number] Stress and Problem                                           Co

mmon



              adjustment                                                  [facility]



              reaction                                                  [facility]

 

       [id number] Presence Problem                                           Com

     of right                                                  [facility]



              artificial                                                  - [facility]



              knee joint                                                  [facility]

 

       [id number] Other  Problem                                           Common



              chronic                                                  [facility]



              pain                                                    [facility]

 

       [id number] Arthritis Problem                                           Co

mmon



       779563 of left                                                  [facility]



              knee                                                    [facility]

 

       [id number] Status Problem                                           Commo

n



       105    post total                                                  [facility]



              right knee                                                  - [facility]



              replacemen                                                  [facility]

 

       Bilateral Degenerati Problem                                           Co

mmon



       arthritis ve                                                      [facility]



       of knees arthritis                                                  - [facility]



              of knee,                                                  [facility]

 

       Essential Benign Problem                                           Common



       hypertensi essential                                                  Spi

rit



       on     HTN                                                     [facility]

 

       Obese  Obese  Problem                                           Common



                                                                      [facility]

 

       Gastroesop GERD   Problem                                           Commo

n



       hageal (gastroeso                                                  [facility]



       [facility]



       [facility]                                                  [facility]

 

       Hyperlipid Hyperlipid Problem                                           C

ommon



       emia   emia                                                    [facility]

 

       [id number] Seasonal Problem                                           Comm

on



              allergic                                                  [facility]



              rhinitis,                                                  - [facility]



              unspecifie                                                  [facility]

 

       Metabolic Metabolic Problem                                           Com

mon



       syndrome X syndrome X                                                  Sp

naomi



                                                                      [facility]

 

       [id number] Aftercare Problem                                           Com

mon



              following                                                  [facility]



              joint                                                   - [facility]



              replacemen                                                  St



              t [facility]

 

       No known No known Disease                                           [facility]

di



       active active                                                  st



       problems problems                                                  Hospit

a



                                                                      l







Allergies, Adverse Reactions, Alerts







       Allergy Allergy Status Severity Reaction(s) Onset  Inactive Treating Comm

ents 

Source



       Name   Type                        Date   Date   Clinician        

 

       Penicill Propensi Active        Rash   2021                      Univer

s



       in     ty to                                               ity of



              adverse                      00:00:                      [place]



              reaction                      00                          Medical



              s                                                       Branch

 

       Penicill Propensi Active                                     Method

i



       ins    ty to                                               st



              adverse                      00:00:                      [facility]osp[facility]



              reaction                      00                          l



              s to                                                    



              drug                                                    







Social History







           Social Habit Start Date Stop Date  Quantity   Comments   Source

 

           History of Tobacco                                             [facility]

 [facility] -



           Use                                                    [facility]

 

           Exposure to                       Not sure              [facility]



           SARS-CoV-2 (event)                                             [facility]

 

           Tobacco use and 2021 Never used            Universit

y of



           exposure   00:00:00   00:00:00                         [facility]

 

           Cigarettes smoked 2017                       [facility]

st



           current (pack per 00:00:00   00:00:00                         Hospita

l



           day) - Reported                                             

 

           Cigarette  2017                       [facility]



           pack-years 00:00:00   00:00:00                         Hospital

 

           Alcohol intake 2017 Current               [facility]



                      00:00:00   00:00:00   non-drinker of            Hospital



                                            alcohol               



                                            (finding)             

 

           Sex Assigned At [birth year]                       [facility]



           Birth      00:00:00   00:00:00                         Hospital









                Smoking Status  Start Date      Stop Date       Source

 

                Unknown if ever smoked                                 Univers[facility]

y [facility]

 

                Former Smoker   2023 00:00:00 2023 00:00:00 [facility]

[facility] - [facility]

nter







Medications







       Ordered Filled Start  Stop   Current Ordering Indication Dosage Frequency

 Signature

                    Comments            Components          Source



     Medication Medication Date Date Medication? Clinician                (SIG) 

          



     Name Name                                                   

 

     methylPREDN methylPREDN 2023- No                  QD   methylPRED   

        



     ISolone 4 ISolone 4                           NISolone 4         

  



     MG   MG   00:00: 00:00                          MG             



               00   :00                                          

 

     methylPREDN methylPREDN 2023- No                  QD   methylPRED   

        



     ISolone 4 ISolone 4                           NISolone 4         

  



     MG   MG   00:00: 00:00                          MG             



               00   :00                                          

 

     HYDROcodone HYDROcodone -0      No             1{table      HYDROcodon 

          



     -Acetaminop -Acetaminop 8-11                     t_as_ne      e-Acetamin   

        



     hen 5-325 hen 5-325 00:00:                     eded}      ophen           



     MG   MG   00                                 5-325 MG           

 

     HYDROcodone HYDROcodone -0      No             1{table      HYDROcodon 

          



     -Acetaminop -Acetaminop 8-11                     t_as_ne      e-Acetamin   

        



     hen 5-325 hen 5-325 00:00:                     eded}      ophen           



     MG   MG   00                                 5-325 MG           

 

     HYDROcodone HYDROcodone -0      No             1{table      HYDROcodon 

          



     -Acetaminop -Acetaminop 8-11                     t_as_ne      e-Acetamin   

        



     hen 5-325 hen 5-325 00:00:                     eded}      ophen           



     MG   MG   00                                 5-325 MG           

 

     HYDROcodone HYDROcodone -0      No             1{table      HYDROcodon 

          



     -Acetaminop -Acetaminop 8-11                     t_as_ne      e-Acetamin   

        



     hen 5-325 hen 5-325 00:00:                     eded}      ophen           



     MG   MG   00                                 5-325 MG           

 

     HYDROcodone HYDROcodone -0      No             1{table      HYDROcodon 

          



     -Acetaminop -Acetaminop 8-11                     t_as_ne      e-Acetamin   

        



     hen 5-325 hen 5-325 00:00:                     eded}      ophen           



     MG   MG   00                                 5-325 MG           

 

     HYDROcodone HYDROcodone -0      No             1{table      HYDROcodon 

          



     -Acetaminop -Acetaminop 8-11                     t_as_ne      e-Acetamin   

        



     hen 5-325 hen 5-325 00:00:                     eded}      ophen           



     MG   MG   00                                 5-325 MG           

 

     HYDROcodone HYDROcodone -0      No             1{table      HYDROcodon 

          



     -Acetaminop -Acetaminop 8-11                     t_as_ne      e-Acetamin   

        



     hen 5-325 hen 5-325 00:00:                     eded}      ophen           



     MG   MG   00                                 5-325 MG           

 

     HYDROcodone HYDROcodone -0      No             1{table      HYDROcodon 

          



     -Acetaminop -Acetaminop 8-11                     t_as_ne      e-Acetamin   

        



     hen 5-325 hen 5-325 00:00:                     eded}      ophen           



     MG   MG   00                                 5-325 MG           

 

     HYDROcodone HYDROcodone -0      No             1{table      HYDROcodon 

          



     -Acetaminop -Acetaminop 8-11                     t_as_ne      e-Acetamin   

        



     hen 5-325 hen 5-325 00:00:                     eded}      ophen           



     MG   MG   00                                 5-325 MG           

 

     HYDROcodone HYDROcodone -0      No             1{table      HYDROcodon 

          



     -Acetaminop -Acetaminop 8-11                     t_as_ne      e-Acetamin   

        



     hen 5-325 hen 5-325 00:00:                     eded}      ophen           



     MG   MG   00                                 5-325 MG           

 

     HYDROcodone HYDROcodone -0      No             1{table      HYDROcodon 

          



     -Acetaminop -Acetaminop 8-11                     t_as_ne      e-Acetamin   

        



     hen 5-325 hen 5-325 00:00:                     eded}      ophen           



     MG   MG   00                                 5-325 MG           

 

     HYDROcodone HYDROcodone -0      No             1{table      HYDROcodon 

          



     -Acetaminop -Acetaminop 8-11                     t_as_ne      e-Acetamin   

        



     hen 5-325 hen 5-325 00:00:                     eded}      ophen           



     MG   MG   00                                 5-325 MG           

 

     HYDROcodone HYDROcodone -0      No             1{table      HYDROcodon 

          



     -Acetaminop -Acetaminop 7-17                     t_as_ne      e-Acetamin   

        



     hen 7.5-325 hen 7.5-325 00:00:                     eded}      ophen        

   



     MG   MG   00                                 7.5-325 MG           

 

     HYDROcodone HYDROcodone -0      No             1{table      HYDROcodon 

          



     -Acetaminop -Acetaminop 7-17                     t_as_ne      e-Acetamin   

        



     hen 7.5-325 hen 7.5-325 00:00:                     eded}      ophen        

   



     MG   MG   00                                 7.5-325 MG           

 

     HYDROcodone HYDROcodone -0      No             1{table      HYDROcodon 

          



     -Acetaminop -Acetaminop 7-17                     t_as_ne      e-Acetamin   

        



     hen 7.5-325 hen 7.5-325 00:00:                     eded}      ophen        

   



     MG   MG   00                                 7.5-325 MG           

 

     HYDROcodone HYDROcodone -0      No             1{table      HYDROcodon 

          



     -Acetaminop -Acetaminop 7-17                     t_as_ne      e-Acetamin   

        



     hen 7.5-325 hen 7.5-325 00:00:                     eded}      ophen        

   



     MG   MG   00                                 7.5-325 MG           

 

     HYDROcodone HYDROcodone -0      No             1{table      HYDROcodon 

          



     -Acetaminop -Acetaminop 7-17                     t_as_ne      e-Acetamin   

        



     hen 7.5-325 hen 7.5-325 00:00:                     eded}      ophen        

   



     MG   MG   00                                 7.5-325 MG           

 

     HYDROcodone HYDROcodone -0      No             1{table      HYDROcodon 

          



     -Acetaminop -Acetaminop 7-17                     t_as_ne      e-Acetamin   

        



     hen 7.5-325 hen 7.5-325 00:00:                     eded}      ophen        

   



     MG   MG   00                                 7.5-325 MG           

 

     HYDROcodone HYDROcodone 0      No             1{table      HYDROcodon 

          



     -Acetaminop -Acetaminop 7-17                     t_as_ne      e-Acetamin   

        



     hen 7.5-325 hen 7.5-325 00:00:                     eded}      ophen        

   



     MG   MG   00                                 7.5-325 MG           

 

     HYDROcodone HYDROcodone 0      No             1{table      HYDROcodon 

          



     -Acetaminop -Acetaminop 7-17                     t_as_ne      e-Acetamin   

        



     hen 7.5-325 hen 7.5-325 00:00:                     eded}      ophen        

   



     MG   MG   00                                 7.5-325 MG           

 

     HYDROcodone HYDROcodone -0      No             1{table      HYDROcodon 

          



     -Acetaminop -Acetaminop 7-17                     t_as_ne      e-Acetamin   

        



     hen 7.5-325 hen 7.5-325 00:00:                     eded}      ophen        

   



     MG   MG   00                                 7.5-325 MG           

 

     HYDROcodone HYDROcodone -0      No             1{table      HYDROcodon 

          



     -Acetaminop -Acetaminop 7-17                     t_as_ne      e-Acetamin   

        



     hen 7.5-325 hen 7.5-325 00:00:                     eded}      ophen        

   



     MG   MG   00                                 7.5-325 MG           

 

     HYDROcodone HYDROcodone -0      No             1{table      HYDROcodon 

          



     -Acetaminop -Acetaminop 7-17                     t_as_ne      e-Acetamin   

        



     hen 7.5-325 hen 7.5-325 00:00:                     eded}      ophen        

   



     MG   MG   00                                 7.5-325 MG           

 

     HYDROcodone HYDROcodone -0      No             1{table      HYDROcodon 

          



     -Acetaminop -Acetaminop 7-17                     t_as_ne      e-Acetamin   

        



     hen 7.5-325 hen 7.5-325 00:00:                     eded}      ophen        

   



     MG   MG   00                                 7.5-325 MG           

 

     HYDROcodone HYDROcodone -0      No             1{table      HYDROcodon 

          



     -Acetaminop -Acetaminop 7-17                     t_as_ne      e-Acetamin   

        



     hen 7.5-325 hen 7.5-325 00:00:                     eded}      ophen        

   



     MG   MG   00                                 7.5-325 MG           

 

     HYDROcodone HYDROcodone -0      No             1{table      HYDROcodon 

          



     -Acetaminop -Acetaminop 7-17                     t_as_ne      e-Acetamin   

        



     hen 7.5-325 hen 7.5-325 00:00:                     eded}      ophen        

   



     MG   MG   00                                 7.5-325 MG           

 

     HYDROcodone HYDROcodone -0      No             1{table      HYDROcodon 

          



     -Acetaminop -Acetaminop 7-17                     t_as_ne      e-Acetamin   

        



     hen 7.5-325 hen 7.5-325 00:00:                     eded}      ophen        

   



     MG   MG   00                                 7.5-325 MG           

 

     HYDROcodone HYDROcodone -0      No             1{table      HYDROcodon 

          



     -Acetaminop -Acetaminop 6-30                     t_as_ne      e-Acetamin   

        



     hen 7.5-325 hen 7.5-325 00:00:                     eded}      ophen        

   



     MG   MG   00                                 7.5-325 MG           

 

     Xarelto 10 Xarelto 10 -0      No             1{table QD   Xarelto 10   

        



     MG   MG   6-30                     t}        MG             



               00:00:                                              



               00                                                

 

     HYDROcodone HYDROcodone -0      No             1{table      HYDROcodon 

          



     -Acetaminop -Acetaminop 6-30                     t_as_ne      e-Acetamin   

        



     hen 7.5-325 hen 7.5-325 00:00:                     eded}      ophen        

   



     MG   MG   00                                 7.5-325 MG           

 

     Xarelto 10 Xarelto 10 -0      No             1{table QD   Xarelto 10   

        



     MG   MG   6-30                     t}        MG             



               00:00:                                              



               00                                                

 

     HYDROcodone HYDROcodone -0      No             1{table      HYDROcodon 

          



     -Acetaminop -Acetaminop 6-30                     t_as_ne      e-Acetamin   

        



     hen 7.5-325 hen 7.5-325 00:00:                     eded}      ophen        

   



     MG   MG   00                                 7.5-325 MG           

 

     Xarelto 10 Xarelto 10 -0      No             1{table QD   Xarelto 10   

        



     MG   MG   6-30                     t}        MG             



               00:00:                                              



               00                                                

 

     HYDROcodone HYDROcodone -0      No             1{table      HYDROcodon 

          



     -Acetaminop -Acetaminop 6-30                     t_as_ne      e-Acetamin   

        



     hen 7.5-325 hen 7.5-325 00:00:                     eded}      ophen        

   



     MG   MG   00                                 7.5-325 MG           

 

     Xarelto 10 Xarelto 10 -0      No             1{table QD   Xarelto 10   

        



     MG   MG   6-30                     t}        MG             



               00:00:                                              



               00                                                

 

     HYDROcodone HYDROcodone -0      No             1{table      HYDROcodon 

          



     -Acetaminop -Acetaminop 6-30                     t_as_ne      e-Acetamin   

        



     hen 7.5-325 hen 7.5-325 00:00:                     eded}      ophen        

   



     MG   MG   00                                 7.5-325 MG           

 

     Xarelto 10 Xarelto 10 -0      No             1{table QD   Xarelto 10   

        



     MG   MG   6-30                     t}        MG             



               00:00:                                              



               00                                                

 

     HYDROcodone HYDROcodone -0      No             1{table      HYDROcodon 

          



     -Acetaminop -Acetaminop 6-30                     t_as_ne      e-Acetamin   

        



     hen 7.5-325 hen 7.5-325 00:00:                     eded}      ophen        

   



     MG   MG   00                                 7.5-325 MG           

 

     Xarelto 10 Xarelto 10 -0      No             1{table QD   Xarelto 10   

        



     MG   MG   6-30                     t}        MG             



               00:00:                                              



               00                                                

 

     Xarelto 10 Xarelto 10 -0      No             1{table QD   Xarelto 10   

        



     MG   MG   6-30                     t}        MG             



               00:00:                                              



               00                                                

 

     HYDROcodone HYDROcodone -0      No             1{table      HYDROcodon 

          



     -Acetaminop -Acetaminop 6-30                     t_as_ne      e-Acetamin   

        



     hen 7.5-325 hen 7.5-325 00:00:                     eded}      ophen        

   



     MG   MG   00                                 7.5-325 MG           

 

     Xarelto 10 Xarelto 10 -0      No             1{table QD   Xarelto 10   

        



     MG   MG   6-30                     t}        MG             



               00:00:                                              



               00                                                

 

     HYDROcodone HYDROcodone -0      No             1{table      HYDROcodon 

          



     -Acetaminop -Acetaminop 6-30                     t_as_ne      e-Acetamin   

        



     hen 7.5-325 hen 7.5-325 00:00:                     eded}      ophen        

   



     MG   MG   00                                 7.5-325 MG           

 

     Xarelto 10 Xarelto 10 -0      No             1{table QD   Xarelto 10   

        



     MG   MG   6-30                     t}        MG             



               00:00:                                              



               00                                                

 

     HYDROcodone HYDROcodone -0      No             1{table      HYDROcodon 

          



     -Acetaminop -Acetaminop 6-30                     t_as_ne      e-Acetamin   

        



     hen 7.5-325 hen 7.5-325 00:00:                     eded}      ophen        

   



     MG   MG   00                                 7.5-325 MG           

 

     HYDROcodone HYDROcodone -0      No             1{table      HYDROcodon 

          



     -Acetaminop -Acetaminop 6-30                     t_as_ne      e-Acetamin   

        



     hen 7.5-325 hen 7.5-325 00:00:                     eded}      ophen        

   



     MG   MG   00                                 7.5-325 MG           

 

     Xarelto 10 Xarelto 10 -0      No             1{table QD   Xarelto 10   

        



     MG   MG   6-30                     t}        MG             



               00:00:                                              



               00                                                

 

     Xarelto 10 Xarelto 10 -0      No             1{table QD   Xarelto 10   

        



     MG   MG   6-30                     t}        MG             



               00:00:                                              



               00                                                

 

     HYDROcodone HYDROcodone -0      No             1{table      HYDROcodon 

          



     -Acetaminop -Acetaminop 6-30                     t_as_ne      e-Acetamin   

        



     hen 7.5-325 hen 7.5-325 00:00:                     eded}      ophen        

   



     MG   MG   00                                 7.5-325 MG           

 

     Xarelto 10 Xarelto 10 0      No             1{table QD   Xarelto 10   

        



     MG   MG   6-30                     t}        MG             



               00:00:                                              



               00                                                

 

     HYDROcodone HYDROcodone -0      No             1{table      HYDROcodon 

          



     -Acetaminop -Acetaminop 6-30                     t_as_ne      e-Acetamin   

        



     hen 7.5-325 hen 7.5-325 00:00:                     eded}      ophen        

   



     MG   MG   00                                 7.5-325 MG           

 

     Xarelto 10 Xarelto 10 -0      No             1{table QD   Xarelto 10   

        



     MG   MG   6-30                     t}        MG             



               00:00:                                              



               00                                                

 

     HYDROcodone HYDROcodone -0      No             1{table      HYDROcodon 

          



     -Acetaminop -Acetaminop 6-30                     t_as_ne      e-Acetamin   

        



     hen 7.5-325 hen 7.5-325 00:00:                     eded}      ophen        

   



     MG   MG   00                                 7.5-325 MG           

 

     Xarelto 10 Xarelto 10 -0      No             1{table QD   Xarelto 10   

        



     MG   MG   6-30                     t}        MG             



               00:00:                                              



               00                                                

 

     HYDROcodone HYDROcodone -0      No             1{table      HYDROcodon 

          



     -Acetaminop -Acetaminop 6-30                     t_as_ne      e-Acetamin   

        



     hen 7.5-325 hen 7.5-325 00:00:                     eded}      ophen        

   



     MG   MG   00                                 7.5-325 MG           

 

     Xarelto 10 Xarelto 10 -0      No             1{table QD   Xarelto 10   

        



     MG   MG   6-30                     t}        MG             



               00:00:                                              



               00                                                

 

     HYDROcodone HYDROcodone -0      No             1{table      HYDROcodon 

          



     -Acetaminop -Acetaminop 6-30                     t_as_ne      e-Acetamin   

        



     hen 7.5-325 hen 7.5-325 00:00:                     eded}      ophen        

   



     MG   MG   00                                 7.5-325 MG           

 

     Xarelto 10 Xarelto 10 -0      No             1{table QD   Xarelto 10   

        



     MG   MG   6-30                     t}        MG             



               00:00:                                              



               00                                                

 

     HYDROcodone HYDROcodone -0      No             1{table      HYDROcodon 

          



     -Acetaminop -Acetaminop 6-30                     t_as_ne      e-Acetamin   

        



     hen 7.5-325 hen 7.5-325 00:00:                     eded}      ophen        

   



     MG   MG   00                                 7.5-325 MG           

 

     Xarelto 10 Xarelto 10 -0      No             1{table QD   Xarelto 10   

        



     MG   MG   6-30                     t}        MG             



               00:00:                                              



               00                                                

 

     HYDROcodone HYDROcodone -0      No             1{table      HYDROcodon 

          



     -Acetaminop -Acetaminop 6-30                     t_as_ne      e-Acetamin   

        



     hen 7.5-325 hen 7.5-325 00:00:                     eded}      ophen        

   



     MG   MG   00                                 7.5-325 MG           

 

     HYDROcodone HYDROcodone -0      No             1{table      HYDROcodon 

          



     -Acetaminop -Acetaminop 3-07                     t_as_ne      e-Acetamin   

        



     hen 7.5-325 hen 7.5-325 00:00:                     eded}      ophen        

   



     MG   MG   00                                 7.5-325 MG           

 

     Xarelto 10 Xarelto 10 -0      No             1{table QD   Xarelto 10   

        



     MG   MG   3-07                     t}        MG             



               00:00:                                              



               00                                                

 

     HYDROcodone HYDROcodone -0      No             1{table      HYDROcodon 

          



     -Acetaminop -Acetaminop 3-07                     t_as_ne      e-Acetamin   

        



     hen 7.5-325 hen 7.5-325 00:00:                     eded}      ophen        

   



     MG   MG   00                                 7.5-325 MG           

 

     Xarelto 10 Xarelto 10 -0      No             1{table QD   Xarelto 10   

        



     MG   MG   3-07                     t}        MG             



               00:00:                                              



               00                                                

 

     HYDROcodone HYDROcodone -0      No             1{table      HYDROcodon 

          



     -Acetaminop -Acetaminop 3-07                     t_as_ne      e-Acetamin   

        



     hen 7.5-325 hen 7.5-325 00:00:                     eded}      ophen        

   



     MG   MG   00                                 7.5-325 MG           

 

     Xarelto 10 Xarelto 10 -0      No             1{table QD   Xarelto 10   

        



     MG   MG   3-07                     t}        MG             



               00:00:                                              



               00                                                

 

     Xarelto 10 Xarelto 10 -0      No             1{table QD   Xarelto 10   

        



     MG   MG   3-07                     t}        MG             



               00:00:                                              



               00                                                

 

     HYDROcodone HYDROcodone -0      No             1{table      HYDROcodon 

          



     -Acetaminop -Acetaminop 3-07                     t_as_ne      e-Acetamin   

        



     hen 7.5-325 hen 7.5-325 00:00:                     eded}      ophen        

   



     MG   MG   00                                 7.5-325 MG           

 

     Xarelto 10 Xarelto 10 -0      No             1{table QD   Xarelto 10   

        



     MG   MG   3-07                     t}        MG             



               00:00:                                              



               00                                                

 

     HYDROcodone HYDROcodone -0      No             1{table      HYDROcodon 

          



     -Acetaminop -Acetaminop 3-07                     t_as_ne      e-Acetamin   

        



     hen 7.5-325 hen 7.5-325 00:00:                     eded}      ophen        

   



     MG   MG   00                                 7.5-325 MG           

 

     Xarelto 10 Xarelto 10 -0      No             1{table QD   Xarelto 10   

        



     MG   MG   3-07                     t}        MG             



               00:00:                                              



               00                                                

 

     HYDROcodone HYDROcodone -0      No             1{table      HYDROcodon 

          



     -Acetaminop -Acetaminop 3-07                     t_as_ne      e-Acetamin   

        



     hen 7.5-325 hen 7.5-325 00:00:                     eded}      ophen        

   



     MG   MG   00                                 7.5-325 MG           

 

     HYDROcodone HYDROcodone -0      No             1{table      HYDROcodon 

          



     -Acetaminop -Acetaminop 3-07                     t_as_ne      e-Acetamin   

        



     hen 7.5-325 hen 7.5-325 00:00:                     eded}      ophen        

   



     MG   MG   00                                 7.5-325 MG           

 

     Xarelto 10 Xarelto 10 -0      No             1{table QD   Xarelto 10   

        



     MG   MG   3-07                     t}        MG             



               00:00:                                              



               00                                                

 

     HYDROcodone HYDROcodone -0      No             1{table      HYDROcodon 

          



     -Acetaminop -Acetaminop 3-07                     t_as_ne      e-Acetamin   

        



     hen 7.5-325 hen 7.5-325 00:00:                     eded}      ophen        

   



     MG   MG   00                                 7.5-325 MG           

 

     Xarelto 10 Xarelto 10 -0      No             1{table QD   Xarelto 10   

        



     MG   MG   3-07                     t}        MG             



               00:00:                                              



               00                                                

 

     HYDROcodone HYDROcodone -0      No             1{table      HYDROcodon 

          



     -Acetaminop -Acetaminop 3-07                     t_as_ne      e-Acetamin   

        



     hen 7.5-325 hen 7.5-325 00:00:                     eded}      ophen        

   



     MG   MG   00                                 7.5-325 MG           

 

     Xarelto 10 Xarelto 10 -0      No             1{table QD   Xarelto 10   

        



     MG   MG   3-07                     t}        MG             



               00:00:                                              



               00                                                

 

     Xarelto 10 Xarelto 10 -0      No             1{table QD   Xarelto 10   

        



     MG   MG   3-07                     t}        MG             



               00:00:                                              



               00                                                

 

     HYDROcodone HYDROcodone -0      No             1{table      HYDROcodon 

          



     -Acetaminop -Acetaminop 3-07                     t_as_ne      e-Acetamin   

        



     hen 7.5-325 hen 7.5-325 00:00:                     eded}      ophen        

   



     MG   MG   00                                 7.5-325 MG           

 

     Xarelto 10 Xarelto 10 -0      No             1{table QD   Xarelto 10   

        



     MG   MG   3-07                     t}        MG             



               00:00:                                              



               00                                                

 

     HYDROcodone HYDROcodone -0      No             1{table      HYDROcodon 

          



     -Acetaminop -Acetaminop 3-07                     t_as_ne      e-Acetamin   

        



     hen 7.5-325 hen 7.5-325 00:00:                     eded}      ophen        

   



     MG   MG   00                                 7.5-325 MG           

 

     Xarelto 10 Xarelto 10 -0      No             1{table QD   Xarelto 10   

        



     MG   MG   3-07                     t}        MG             



               00:00:                                              



               00                                                

 

     HYDROcodone HYDROcodone -0      No             1{table      HYDROcodon 

          



     -Acetaminop -Acetaminop 3-07                     t_as_ne      e-Acetamin   

        



     hen 7.5-325 hen 7.5-325 00:00:                     eded}      ophen        

   



     MG   MG   00                                 7.5-325 MG           

 

     Xarelto 10 Xarelto 10 -0      No             1{table QD   Xarelto 10   

        



     MG   MG   3-07                     t}        MG             



               00:00:                                              



               00                                                

 

     HYDROcodone HYDROcodone -0      No             1{table      HYDROcodon 

          



     -Acetaminop -Acetaminop 3-07                     t_as_ne      e-Acetamin   

        



     hen 7.5-325 hen 7.5-325 00:00:                     eded}      ophen        

   



     MG   MG   00                                 7.5-325 MG           

 

     Xarelto 10 Xarelto 10 -0      No             1{table QD   Xarelto 10   

        



     MG   MG   3-07                     t}        MG             



               00:00:                                              



               00                                                

 

     HYDROcodone HYDROcodone -0      No             1{table      HYDROcodon 

          



     -Acetaminop -Acetaminop 3-07                     t_as_ne      e-Acetamin   

        



     hen 7.5-325 hen 7.5-325 00:00:                     eded}      ophen        

   



     MG   MG   00                                 7.5-325 MG           

 

     Xarelto 10 Xarelto 10 -0      No             1{table QD   Xarelto 10   

        



     MG   MG   3-07                     t}        MG             



               00:00:                                              



               00                                                

 

     HYDROcodone HYDROcodone -0      No             1{table      HYDROcodon 

          



     -Acetaminop -Acetaminop 3-07                     t_as_ne      e-Acetamin   

        



     hen 7.5-325 hen 7.5-325 00:00:                     eded}      ophen        

   



     MG   MG   00                                 7.5-325 MG           

 

     Xarelto 10 Xarelto 10 -0      No             1{table QD   Xarelto 10   

        



     MG   MG   3-07                     t}        MG             



               00:00:                                              



               00                                                

 

     HYDROcodone HYDROcodone -0      No             1{table      HYDROcodon 

          



     -Acetaminop -Acetaminop 3-07                     t_as_ne      e-Acetamin   

        



     hen 7.5-325 hen 7.5-325 00:00:                     eded}      ophen        

   



     MG   MG   00                                 7.5-325 MG           

 

     Xarelto 10 Xarelto 10 -0      No             1{table QD   Xarelto 10   

        



     MG   MG   3-07                     t}        MG             



               00:00:                                              



               00                                                

 

     HYDROcodone HYDROcodone -0      No             1{table      HYDROcodon 

          



     -Acetaminop -Acetaminop 3-07                     t_as_ne      e-Acetamin   

        



     hen 7.5-325 hen 7.5-325 00:00:                     eded}      ophen        

   



     MG   MG   00                                 7.5-325 MG           

 

     HYDROcodone HYDROcodone -0      No             1{table      HYDROcodon 

          



     -Acetaminop -Acetaminop 3-07                     t_as_ne      e-Acetamin   

        



     hen 7.5-325 hen 7.5-325 00:00:                     eded}      ophen        

   



     MG   MG   00                                 7.5-325 MG           

 

     Xarelto 10 Xarelto 10 -0      No             1{table QD   Xarelto 10   

        



     MG   MG   3-07                     t}        MG             



               00:00:                                              



               00                                                

 

     HYDROcodone HYDROcodone -0      No             1{table      HYDROcodon 

          



     -Acetaminop -Acetaminop 3-07                     t_as_ne      e-Acetamin   

        



     hen 7.5-325 hen 7.5-325 00:00:                     eded}      ophen        

   



     MG   MG   00                                 7.5-325 MG           

 

     Xarelto 10 Xarelto 10 -0      No             1{table QD   Xarelto 10   

        



     MG   MG   3-07                     t}        MG             



               00:00:                                              



               00                                                

 

     HYDROcodone HYDROcodone -0      No             1{table      HYDROcodon 

          



     -Acetaminop -Acetaminop 3-07                     t_as_ne      e-Acetamin   

        



     hen 7.5-325 hen 7.5-325 00:00:                     eded}      ophen        

   



     MG   MG   00                                 7.5-325 MG           

 

     Xarelto 10 Xarelto 10 -0      No             1{table QD   Xarelto 10   

        



     MG   MG   3-07                     t}        MG             



               00:00:                                              



               00                                                

 

     HYDROcodone HYDROcodone -0      No             1{table      HYDROcodon 

          



     -Acetaminop -Acetaminop 3-07                     t_as_ne      e-Acetamin   

        



     hen 7.5-325 hen 7.5-325 00:00:                     eded}      ophen        

   



     MG   MG   00                                 7.5-325 MG           

 

     Xarelto 10 Xarelto 10 -0      No             1{table QD   Xarelto 10   

        



     MG   MG   3-07                     t}        MG             



               00:00:                                              



               00                                                

 

     HYDROcodone HYDROcodone -0      No             1{table      HYDROcodon 

          



     -Acetaminop -Acetaminop 3-07                     t_as_ne      e-Acetamin   

        



     hen 7.5-325 hen 7.5-325 00:00:                     eded}      ophen        

   



     MG   MG   00                                 7.5-325 MG           

 

     Xarelto 10 Xarelto 10 -0      No             1{table QD   Xarelto 10   

        



     MG   MG   3-07                     t}        MG             



               00:00:                                              



               00                                                

 

     HYDROcodone HYDROcodone -0      No             1{table      HYDROcodon 

          



     -Acetaminop -Acetaminop 3-07                     t_as_ne      e-Acetamin   

        



     hen 7.5-325 hen 7.5-325 00:00:                     eded}      ophen        

   



     MG   MG   00                                 7.5-325 MG           

 

     Xarelto 10 Xarelto 10 -0      No             1{table QD   Xarelto 10   

        



     MG   MG   3-07                     t}        MG             



               00:00:                                              



               00                                                

 

     HYDROcodone HYDROcodone -0      No             1{table      HYDROcodon 

          



     -Acetaminop -Acetaminop 3-07                     t_as_ne      e-Acetamin   

        



     hen 7.5-325 hen 7.5-325 00:00:                     eded}      ophen        

   



     MG   MG   00                                 7.5-325 MG           

 

     Xarelto 10 Xarelto 10 -0      No             1{table QD   Xarelto 10   

        



     MG   MG   3-07                     t}        MG             



               00:00:                                              



               00                                                

 

     HYDROcodone HYDROcodone -0      No             1{table      HYDROcodon 

          



     -Acetaminop -Acetaminop 3-07                     t_as_ne      e-Acetamin   

        



     hen 7.5-325 hen 7.5-325 00:00:                     eded}      ophen        

   



     MG   MG   00                                 7.5-325 MG           

 

     Xarelto 10 Xarelto 10 -0      No             1{table QD   Xarelto 10   

        



     MG   MG   3-07                     t}        MG             



               00:00:                                              



               00                                                

 

     HYDROcodone HYDROcodone -0      No             1{table      HYDROcodon 

          



     -Acetaminop -Acetaminop 3-07                     t_as_ne      e-Acetamin   

        



     hen 7.5-325 hen 7.5-325 00:00:                     eded}      ophen        

   



     MG   MG   00                                 7.5-325 MG           

 

     Xarelto 10 Xarelto 10 -0      No             1{table QD   Xarelto 10   

        



     MG   MG   3-07                     t}        MG             



               00:00:                                              



               00                                                

 

     HYDROcodone HYDROcodone -0      No             1{table      HYDROcodon 

          



     -Acetaminop -Acetaminop 3-07                     t_as_ne      e-Acetamin   

        



     hen 7.5-325 hen 7.5-325 00:00:                     eded}      ophen        

   



     MG   MG   00                                 7.5-325 MG           

 

     Xarelto 10 Xarelto 10 -0      No             1{table QD   Xarelto 10   

        



     MG   MG   3-07                     t}        MG             



               00:00:                                              



               00                                                

 

     HYDROcodone HYDROcodone -0      No             1{table      HYDROcodon 

          



     -Acetaminop -Acetaminop 3-07                     t_as_ne      e-Acetamin   

        



     hen 7.5-325 hen 7.5-325 00:00:                     eded}      ophen        

   



     MG   MG   00                                 7.5-325 MG           

 

     Xarelto 10 Xarelto 10       No             1{table QD   Xarelto 10   

        



     MG   MG   3-07                     t}        MG             



               00:00:                                              



               00                                                

 

     No known      2021      No                                      [facility]



     [facility]      2-29                                              [facility]



               15:29:                                              [address]

 

     No known      2021      No                                      [facility]



     medications      2-29                                              [facility]



               15:29:                                              [address]

 

     No known      2021      No                                      [facility]



     medications      2-29                                              [facility] of



               15:29:                                              [address]

 

     No known      2021      No                                      [facility]



     medications      2-29                                              [facility] of



               15:29:                                              [address]

 

     No known      2021      No                                      [facility]



     medications      2-29                                              [facility] of



               15:29:                                              [address]

 

     No known      2021      No                                      [facility]



     medications      2-29                                              [facility] of



               15:29:                                              [address]

 

     No known      2021      No                                      [facility]



     medications      2-29                                              [facility] of



               15:29:                                              [address]

 

     Hyalgan 20 Hyalgan 20 2020      No             20mg                     C

[facility]mon



     mg   mg   2-17                                              [facility]



               00:00:                                              - [facility]



                                                               [facility]

 

     Hyalgan 20 Hyalgan 20 2020      No             20mg                     C

ommon



     mg   mg   2-17                                              [facility]



               00:00:                                              - [facility]



                                                               [facility]

 

     Hyalgan 20 Hyalgan 20 2020      No             20mg                     C

[facility]



     mg   mg   2-17                                              [facility]



               00:00:                                              - [facility]



                                                               [facility]

 

     Hyalgan 20 Hyalgan 20 2020      No             20mg                     C

[facility]



     mg   mg   2-17                                              [facility]



               00:00:                                              - [facility]



                                                               [facility]

 

     Hyalgan 20 Hyalgan 20 2020      No             20mg                     C

[facility]



     mg   mg   2-17                                              [facility]



               00:00:                                              - [facility]



                                                               [facility]

 

     Hyalgan 20 Hyalgan 20 2020      No             20mg                     C

[facility]



     mg   mg   2-17                                              [facility]



               00:00:                                              - [facility]



                                                               [facility]

 

     Hyalgan 20 Hyalgan 20 2020      No             20mg                     C

[facility]



     mg   mg   2-17                                              [facility]



               00:00:                                              - [facility]



                                                               [facility]

 

     Hyalgan 20 Hyalgan 20 2020      No             20mg                     C

[facility]



     mg   mg   2-17                                              [facility]



               00:00:                                              - [facility]



                                                               [facility]

 

     Hyalgan 20 Hyalgan 20 2020      No             20mg                     C

[facility]



     mg   mg   2-17                                              [facility]



               00:00:                                              - [facility]



                                                               [facility]

 

     Hyalgan 20 Hyalgan 20 -      No             20mg                     C

[facility]



     mg   mg   2-17                                              [facility]



               00:00:                                              - [facility]



                                                               [facility]

 

     Hyalgan 20 Hyalgan 20 -      No             20mg                     C

[facility]



     mg   mg   2-17                                              [facility]



               00:00:                                              - [facility]



                                                               [facility]

 

     Hyalgan 20 Hyalgan 20 -      No             20mg                     C

[facility]



     mg   mg   2-17                                              [facility]



               00:00:                                              - [facility]



                                                               [facility]

 

     Hyalgan 20 Hyalgan 20 -      No             20mg                     C

[facility]



     mg   mg   2-17                                              [facility]



               00:00:                                              - [facility]



                                                               [facility]

 

     Hyalgan 20 Hyalgan 20 -      No             20mg                     C

[facility]



     mg   mg   2-                                              [facility]



               00:00:                                              - [facility]



                                                               [facility]

 

     Hyalgan 20 Hyalgan 20 -      No             20mg                     C

[facility]



     mg   mg   2-                                              [facility]



               00:00:                                              - [facility]



                                                               [facility]

 

     Hyalgan 20 Hyalgan 20 -      No             20mg                     C

[facility]



     mg   mg   2-                                              [facility]



               00:00:                                              - [facility]



                                                               [facility]

 

     Hyalgan 20 Hyalgan 20 -      No             20mg                     C

[facility]



     mg   mg   2-                                              [facility]



               00:00:                                              - [facility]



                                                               [facility]

 

     Hyalgan 20 Hyalgan 20 -      No             20mg                     C

[facility]



     mg   mg   2-                                              [facility]



               00:00:                                              - [facility]



                                                               [facility]

 

     Hyalgan 20 Hyalgan 20 -      No             20mg                     C

[facility]



     mg   mg   2-17                                              [facility]



               00:00:                                              - [facility]



                                                               [facility]

 

     Hyalgan 20 Hyalgan 20 -      No             20mg                     C

[facility]



     mg   mg   2-17                                              [facility]



               00:00:                                              - [facility]



                                                               [facility]

 

     Hyalgan 20 Hyalgan 20 -      No             20mg                     C

[facility]



     mg   mg   2-17                                              [facility]



               00:00:                                              - [facility]



                                                               [facility]

 

     Hyalgan 20 Hyalgan 20 -      No             20mg                     C

[facility]



     mg   mg   2-17                                              [facility]



               00:00:                                              - [facility]



                                                               [facility]

 

     Hyalgan 20 Hyalgan 20 -      No             20mg                     C

[facility]



     mg   mg   2-17                                              [facility]



               00:00:                                              - [facility]



               00                                                [facility]

 

     Hyalgan 20 Hyalgan 20 -      No             20mg                     C

[facility]



     mg   mg   2-17                                              [facility]



               00:00:                                              - [facility]



                                                               [facility]

 

     Hyalgan 20 Hyalgan 20 -      No             20mg                     C

[facility]



     mg   mg   2-17                                              [facility]



               00:00:                                              - [facility]



                                                               [facility]

 

     Hyalgan 20 Hyalgan 20 -      No             20mg                     C

[facility]



     mg   mg   2-17                                              [facility]



               00:00:                                              - [facility]



                                                               [facility]

 

     Hyalgan 20 Hyalgan 20 -      No             20mg                     C

[facility]



     mg   mg   2-17                                              [facility]



               00:00:                                              - [facility]



                                                               [facility]

 

     Hyalgan 20 Hyalgan 20 -      No             20mg                     C

[facility]



     mg   mg   2-                                              [facility]



               00:00:                                              - [facility]



                                                               [facility]

 

     Hyalgan 20 Hyalgan 20 -      No             20mg                     C

[facility]



     mg   mg   2-                                              [facility]



               00:00:                                              - [facility]



                                                               [facility]

 

     Hyalgan 20 Hyalgan 20 -      No             20mg                     C

[facility]



     mg   mg   2-17                                              [facility]



               00:00:                                              - [facility]



                                                               [facility]

 

     Hyalgan 20 Hyalgan 20 2020      No             20mg                     C

[facility]



     mg   mg   2-                                              [facility]



               00:00:                                              - [facility]



                                                               [facility]

 

     Hyalgan 20 Hyalgan 20 -      No             20mg                     C

[facility]



     mg   mg   2-                                              [facility]



               00:00:                                              - [facility]



                                                               [facility]

 

     Hyalgan 20 Hyalgan 20 2020      No             20mg                     C

[facility]



     mg   mg   2-                                              [facility]



               00:00:                                              - [facility]



                                                               [facility]

 

     Hyalgan 20 Hyalgan 20 -      No             20mg                     C

[facility]



     mg   mg   2-17                                              [facility]



               00:00:                                              - [facility]



                                                               [facility]

 

     Hyalgan 20 Hyalgan 20 -      No             20mg                     C

[facility]



     mg   mg   2-17                                              [facility]



               00:00:                                              - [facility]



                                                               [facility]

 

     Hyalgan 20 Hyalgan 20 -      No             20mg                     C

[facility]



     mg   mg   2-17                                              [facility]



               00:00:                                              - [facility]



                                                               [facility]

 

     Hyalgan 20 Hyalgan 20 -      No             20mg                     C

[facility]



     mg   mg   2-17                                              [facility]



               00:00:                                              - [facility]



                                                               [facility]

 

     Hyalgan 20 Hyalgan 20 -      No             20mg                     C

[facility]



     mg   mg   2-17                                              [facility]



               00:00:                                              - [facility]



               00                                                [facility]

 

     Hyalgan 20 Hyalgan 20 2020      No             20mg                     C

[facility]



     mg   mg   2-                                              [facility]



               00:00:                                              - [facility]



                                                               [facility]

 

     Hyalgan 20 Hyalgan 20 -      No             20mg                     C

[facility]



     mg   mg   2                                              [facility]



               00:00:                                              - [facility]



                                                               [facility]

 

     Hyalgan 20 Hyalgan 20 -      No             20mg                     C

[facility]



     mg   mg   2-                                              [facility]



               00:00:                                              - [facility]



                                                               [facility]

 

     Hyalgan 20 Hyalgan 20 -      No             20mg                     C

[facility]



     mg   mg   2-17                                              [facility]



               00:00:                                              - [facility]



                                                               [facility]

 

     Hyalgan 20 Hyalgan 20 -      No             20mg                     C

[facility]



     mg   mg   2-                                              [facility]



               00:00:                                              - [facility]



                                                               [facility]

 

     Hyalgan 20 Hyalgan 20 -      No             20mg                     C

[facility]



     mg   mg   2                                              [facility]



               00:00:                                              - [facility]



                                                               [facility]

 

     Hyalgan 20 Hyalgan 20 -      No             20mg                     C

[facility]



     mg   mg   2                                              [facility]



               00:00:                                              - [facility]



                                                               [facility]

 

     Hyalgan 20 Hyalgan 20 -      No             20mg                     C

[facility]



     mg   mg   2                                              [facility]



               00:00:                                              - [facility]



                                                               [facility]

 

     Hyalgan 20 Hyalgan 20 -      No             20mg                     C

[facility]



     mg   mg   2                                              [facility]



               00:00:                                              - [facility]



                                                               [facility]

 

     Hyalgan 20 Hyalgan 20 -      No             20mg                     C

[facility]



     mg   mg   2                                              [facility]



               00:00:                                              - [facility]



                                                               [facility]

 

     Hyalgan 20 Hyalgan 20 -      No             20mg                     C

[facility]



     mg   mg   2-                                              [facility]



               00:00:                                              - [facility]



                                                               [facility]

 

     Hyalgan 20 Hyalgan 20 -      No             20mg                     C

[facility]



     mg   mg   2-17                                              [facility]



               00:00:                                              - [facility]



                                                               [facility]

 

     Hyalgan 20 Hyalgan 20 -      No             20mg                     C

[facility]



     mg   mg   2-17                                              [facility]



               00:00:                                              - [facility]



                                                               [facility]

 

     Hyalgan 20 Hyalgan 20 -      No             20mg                     C

[facility]



     mg   mg   2-17                                              [facility]



               00:00:                                              - [facility]



                                                               [facility]

 

     Hyalgan 20 Hyalgan 20 -      No             20mg                     C

[facility]



     mg   mg   2-17                                              [facility]



               00:00:                                              - [facility]



                                                               [facility]

 

     Hyalgan 20 Hyalgan 20 -      No             20mg                     C

ommon



     mg   mg   2-17                                              [facility]



               00:00:                                              - [facility]



                                                               [facility]

 

     Hyalgan 20 Hyalgan 20 -      No             20mg                     C

[facility]



     mg   mg   2-17                                              [facility]



               00:00:                                              - [facility]



                                                               [facility]

 

     Hyalgan 20 Hyalgan 20 -1      No             20mg                     C

[facility]



     mg   mg   2-17                                              [facility]



               00:00:                                              - [facility]



                                                               [facility]

 

     Hyalgan 20 Hyalgan 20 -      No             20mg                     C

[facility]



     mg   mg   2-17                                              [facility]



               00:00:                                              - [facility]



                                                               [facility]

 

     Hyalgan 20 Hyalgan 20 -      No             20mg                     C

ommon



     mg   mg   2-17                                              [facility]



               00:00:                                              - [facility]



                                                               [facility]

 

     Hyalgan 20 Hyalgan 20 -      No             20mg                     C

ommon



     mg   mg   217                                              [facility]



               00:00:                                              - [facility]



                                                               [facility]

 

     Hyalgan 20 Hyalgan 20 -      No             20mg                     C

ommon



     mg   mg   2                                              [facility]



               00:00:                                              - [facility]



                                                               [facility]

 

     Hyalgan 20 Hyalgan 20 -      No             20mg                     C

ommon



     mg   mg   2                                              [facility]



               00:00:                                              - [facility]



                                                               [facility]

 

     Hyalgan 20 Hyalgan 20 -      No             20mg                     C

ommon



     mg   mg   2                                              [facility]



               00:00:                                              - [facility]



                                                               [facility]

 

     Hyalgan 20 Hyalgan 20 -      No             20mg                     C

ommon



     mg   mg   2-10                                              [facility]



               00:00:                                              - [facility]



                                                               [facility]

 

     Hyalgan 20 Hyalgan 20 -1      No             20mg                     C

ommon



     mg   mg   2-10                                              [facility]



               00:00:                                              - [facility]



                                                               [facility]

 

     Hyalgan 20 Hyalgan 20 -1      No             20mg                     C

ommon



     mg   mg   2-10                                              [facility]



               00:00:                                              - [facility]



                                                               [facility]

 

     Hyalgan 20 Hyalgan 20 -1      No             20mg                     C

ommon



     mg   mg   2-10                                              [facility]



               00:00:                                              - [facility]



               00                                                [facility]

 

     Hyalgan 20 Hyalgan 20 -1      No             20mg                     C

ommon



     mg   mg   2-10                                              [facility]



               00:00:                                              - [facility]



               00                                                [facility]

 

     Hyalgan 20 Hyalgan 20 -1      No             20mg                     C

ommon



     mg   mg   2-10                                              [facility]



               00:00:                                              - [facility]



               00                                                [facility]

 

     Hyalgan 20 Hyalgan 20 -1      No             20mg                     C

ommon



     mg   mg   2-10                                              [facility]



               00:00:                                              - [facility]



               00                                                [facility]

 

     Hyalgan 20 Hyalgan 20 2020-1      No             20mg                     C

ommon



     mg   mg   2-10                                              [facility]



               00:00:                                              - [facility]



                                                               [facility]

 

     Hyalgan 20 Hyalgan 20 -1      No             20mg                     C

ommon



     mg   mg   2-10                                              [facility]



               00:00:                                              - [facility]



                                                               [facility]

 

     Hyalgan 20 Hyalgan 20 -1      No             20mg                     C

ommon



     mg   mg   2-10                                              [facility]



               00:00:                                              - [facility]



                                                               [facility]

 

     Hyalgan 20 Hyalgan 20 -1      No             20mg                     C

ommon



     mg   mg   2-10                                              [facility]



               00:00:                                              - [facility]



                                                               [facility]

 

     Hyalgan 20 Hyalgan 20 -1      No             20mg                     C

ommon



     mg   mg   2-10                                              [facility]



               00:00:                                              - [facility]



                                                               [facility]

 

     Hyalgan 20 Hyalgan 20 -1      No             20mg                     C

ommon



     mg   mg   2-10                                              [facility]



               00:00:                                              - [facility]



                                                               [facility]

 

     Hyalgan 20 Hyalgan 20 -      No             20mg                     C

ommon



     mg   mg   2-10                                              [facility]



               00:00:                                              - [facility]



                                                               [facility]

 

     Hyalgan 20 Hyalgan 20 -      No             20mg                     C

ommon



     mg   mg   2-10                                              [facility]



               00:00:                                              - [facility]



                                                               [facility]

 

     Hyalgan 20 Hyalgan 20 -1      No             20mg                     C

ommon



     mg   mg   2-10                                              [facility]



               00:00:                                              - [facility]



                                                               [facility]

 

     Hyalgan 20 Hyalgan 20 -1      No             20mg                     C

ommon



     mg   mg   2-10                                              [facility]



               00:00:                                              - [facility]



               00                                                [facility]

 

     Hyalgan 20 Hyalgan 20 -1      No             20mg                     C

ommon



     mg   mg   2-10                                              [facility]



               00:00:                                              - [facility]



               00                                                [facility]

 

     Hyalgan 20 Hyalgan 20 -1      No             20mg                     C

ommon



     mg   mg   2-10                                              [facility]



               00:00:                                              - [facility]



               00                                                [facility]

 

     Hyalgan 20 Hyalgan 20 2020-1      No             20mg                     C

ommon



     mg   mg   2-10                                              [facility]



               00:00:                                              - [facility]



               00                                                [facility]

 

     Hyalgan 20 Hyalgan 20 -1      No             20mg                     C

ommon



     mg   mg   2-10                                              [facility]



               00:00:                                              - [facility]



               00                                                [facility]

 

     Hyalgan 20 Hyalgan 20 2020-1      No             20mg                     C

ommon



     mg   mg   2-10                                              [facility]



               00:00:                                              - [facility]



               00                                                [facility]

 

     Hyalgan 20 Hyalgan 20 2020-1      No             20mg                     C

ommon



     mg   mg   2-10                                              [facility]



               00:00:                                              - [facility]



                                                               [facility]

 

     Hyalgan 20 Hyalgan 20 2020-1      No             20mg                     C

ommon



     mg   mg   2-10                                              [facility]



               00:00:                                              - [facility]



                                                               [facility]

 

     Hyalgan 20 Hyalgan 20 -1      No             20mg                     C

ommon



     mg   mg   2-10                                              [facility]



               00:00:                                              - [facility]



                                                               [facility]

 

     Hyalgan 20 Hyalgan 20 -1      No             20mg                     C

ommon



     mg   mg   2-10                                              [facility]



               00:00:                                              - [facility]



               00                                                [facility]

 

     Hyalgan 20 Hyalgan 20 -1      No             20mg                     C

ommon



     mg   mg   2-10                                              [facility]



               00:00:                                              - [facility]



                                                               [facility]

 

     Hyalgan 20 Hyalgan 20 -1      No             20mg                     C

ommon



     mg   mg   2-10                                              [facility]



               00:00:                                              - [facility]



                                                               [facility]

 

     Hyalgan 20 Hyalgan 20 -1      No             20mg                     C

ommon



     mg   mg   2-10                                              [facility]



               00:00:                                              - [facility]



                                                               [facility]

 

     Hyalgan 20 Hyalgan 20 -1      No             20mg                     C

ommon



     mg   mg   2-10                                              [facility]



               00:00:                                              - [facility]



                                                               [facility]

 

     Hyalgan 20 Hyalgan 20 -1      No             20mg                     C

ommon



     mg   mg   2-10                                              [facility]



               00:00:                                              - [facility]



                                                               [facility]

 

     Hyalgan 20 Hyalgan 20 -1      No             20mg                     C

ommon



     mg   mg   2-10                                              [facility]



               00:00:                                              - [facility]



                                                               [facility]

 

     Hyalgan 20 Hyalgan 20 -1      No             20mg                     C

ommon



     mg   mg   2-10                                              [facility]



               00:00:                                              - [facility]



                                                               [facility]

 

     Hyalgan 20 Hyalgan 20 -1      No             20mg                     C

ommon



     mg   mg   2-10                                              [facility]



               00:00:                                              - [facility]



               00                                                [facility]

 

     Hyalgan 20 Hyalgan 20 2020-1      No             20mg                     C

ommon



     mg   mg   2-10                                              [facility]



               00:00:                                              - [facility]



               00                                                [facility]

 

     Hyalgan 20 Hyalgan 20 2020-1      No             20mg                     C

ommon



     mg   mg   2-10                                              [facility]



               00:00:                                              - [facility]



               00                                                [facility]

 

     Hyalgan 20 Hyalgan 20 2020-1      No             20mg                     C

ommon



     mg   mg   2-10                                              [facility]



               00:00:                                              - [facility]



               00                                                [facility]

 

     Hyalgan 20 Hyalgan 20 -      No             20mg                     C

ommon



     mg   mg   2-10                                              [facility]



               00:00:                                              - [facility]



                                                               [facility]

 

     Hyalgan 20 Hyalgan 20 -1      No             20mg                     C

ommon



     mg   mg   2-10                                              [facility]



               00:00:                                              - [facility]



                                                               [facility]

 

     Hyalgan 20 Hyalgan 20 -      No             20mg                     C

ommon



     mg   mg   2-10                                              [facility]



               00:00:                                              - [facility]



                                                               [facility]

 

     Hyalgan 20 Hyalgan 20 -      No             20mg                     C

ommon



     mg   mg   2-10                                              [facility]



               00:00:                                              - [facility]



                                                               [facility]

 

     Hyalgan 20 Hyalgan 20 -      No             20mg                     C

ommon



     mg   mg   2-10                                              [facility]



               00:00:                                              - [facility]



                                                               [facility]

 

     Hyalgan 20 Hyalgan 20 -      No             20mg                     C

ommon



     mg   mg   2-10                                              [facility]



               00:00:                                              - [facility]



                                                               [facility]

 

     Hyalgan 20 Hyalgan 20 -      No             20mg                     C

ommon



     mg   mg   2-10                                              [facility]



               00:00:                                              - [facility]



                                                               [facility]

 

     Hyalgan 20 Hyalgan 20 -      No             20mg                     C

ommon



     mg   mg   2-10                                              [facility]



               00:00:                                              - [facility]



                                                               [facility]

 

     Hyalgan 20 Hyalgan 20 -      No             20mg                     C

ommon



     mg   mg   2-10                                              [facility]



               00:00:                                              - [facility]



                                                               [facility]

 

     Hyalgan 20 Hyalgan 20 -      No             20mg                     C

ommon



     mg   mg   2-10                                              [facility]



               00:00:                                              - [facility]



                                                               [facility]

 

     Hyalgan 20 Hyalgan 20 -      No             20mg                     C

ommon



     mg   mg   2-10                                              [facility]



               00:00:                                              - [facility]



                                                               [facility]

 

     Hyalgan 20 Hyalgan 20 -1      No             20mg                     C

ommon



     mg   mg   2-10                                              [facility]



               00:00:                                              - [facility]



                                                               [facility]

 

     Hyalgan 20 Hyalgan 20 -1      No             20mg                     C

ommon



     mg   mg   2-10                                              [facility]



               00:00:                                              - [facility]



                                                               [facility]

 

     Hyalgan 20 Hyalgan 20 2020-1      No             20mg                     C

ommon



     mg   mg   2-10                                              [facility]



               00:00:                                              - [facility]



                                                               [facility]

 

     Hyalgan 20 Hyalgan 20 -1      No             20mg                     C

ommon



     mg   mg   2-10                                              [facility]



               00:00:                                              - [facility]



                                                               [facility]

 

     Hyalgan 20 Hyalgan 20 2020-      No             20mg                     C

ommon



     mg   mg   2-10                                              [facility]



               00:00:                                              - [facility]



               00                                                [facility]

 

     Hyalgan 20 Hyalgan 20 -      No             20mg                     C

ommon



     mg   mg   2-10                                              [facility]



               00:00:                                              - [facility]



                                                               [facility]

 

     Hyalgan 20 Hyalgan 20 -      No             20mg                     C

ommon



     mg   mg   2-10                                              [facility]



               00:00:                                              - [facility]



                                                               [facility]

 

     Hyalgan 20 Hyalgan 20 -      No             20mg                     C

ommon



     mg   mg   2-10                                              [facility]



               00:00:                                              - [facility]



               00                                                [facility]

 

     Hyalgan 20 Hyalgan 20 -      No             20mg                     C

ommon



     mg   mg   2-10                                              [facility]



               00:00:                                              - [facility]



                                                               [facility]

 

     Hyalgan 20 Hyalgan 20 -      No             20mg                     C

ommon



     mg   mg   2-10                                              [facility]



               00:00:                                              - [facility]



               00                                                [facility]

 

     Hyalgan 20 Hyalgan 20 -      No             20mg                     C

ommon



     mg   mg   2-10                                              [facility]



               00:00:                                              - [facility]



                                                               [facility]

 

     Hyalgan 20 Hyalgan 20 -      No             20mg                     C

ommon



     mg   mg   2-10                                              [facility]



               00:00:                                              - [facility]



                                                               [facility]

 

     Hyalgan 20 Hyalgan 20 -      No             20mg                     C

ommon



     mg   mg   2-10                                              [facility]



               00:00:                                              - [facility]



                                                               [facility]

 

     Hyalgan 20 Hyalgan 20 -      No             20mg                     C

ommon



     mg   mg   2-10                                              [facility]



               00:00:                                              - [facility]



                                                               [facility]

 

     Hyalgan 20 Hyalgan 20 -      No             20mg                     C

ommon



     mg   mg   2-04                                              [facility]



               00:00:                                              - [facility]



                                                               [facility]

 

     Kenalog Kenalog 2020      No             40mg                     [facility]



     (Triamcinol (Triamcinol 2-04                                              S

[facility]



     one) one) 00:00:                                              - [facility]



               00                                                [facility]

 

     Hyalgan 20 Hyalgan 20 -      No             20mg                     C

ommon



     mg   mg   2-04                                              [facility]



               00:00:                                              - [facility]



                                                               [facility]

 

     Bupivicaine Bupivicaine 2020      No             2.5mg                   

  [facility]



     Hydro Hydro 2-04                                              [facility]



               00:00:                                              - [facility]



                                                               [facility]

 

     Bupivicaine Bupivicaine 2020      No             2.5mg                   

  [facility]



     Hydro Hydro 2-04                                              [facility]



               00:00:                                              - [facility]



               00                                                [facility]

 

     Kenalog Kenalog -      No             40mg                     [facility]



     (Triamcinol (Triamcinol 2-04                                              S

[facility]



     one) one) 00:00:                                              - [facility]



               00                                                [facility]

 

     Hyalgan 20 Hyalgan 20 -      No             20mg                     C

ommon



     mg   mg   2-04                                              [facility]



               00:00:                                              - [facility]



               00                                                [facility]

 

     Kenalog Kenalog -      No             40mg                     [facility]



     (Triamcinol (Triamcinol 2-04                                              S

[facility]



     one) one) 00:00:                                              - [facility]



               00                                                [facility]

 

     Hyalgan 20 Hyalgan 20 -      No             20mg                     C

ommon



     mg   mg   2-04                                              [facility]



               00:00:                                              - [facility]



               00                                                [facility]

 

     Bupivicaine Bupivicaine -      No             2.5mg                   

  Common



     Hydro Hydro 2-04                                              [facility]



               00:00:                                              - [facility]



               00                                                [facility]

 

     Bupivicaine Bupivicaine -      No             2.5mg                   

  Common



     Hydro Hydro 2-04                                              [facility]



               00:00:                                              - [facility]



               00                                                [facility]

 

     Kenalog Kenalog -      No             40mg                     Common



     (Triamcinol (Triamcinol 2-04                                              S

pirit



     one) one) 00:00:                                              - [facility]



               00                                                [facility]

 

     Hyalgan 20 Hyalgan 20 -      No             20mg                     C

ommon



     mg   mg   2-04                                              [facility]



               00:00:                                              - [facility]



               00                                                [facility]

 

     Kenalog Kenalog -      No             40mg                     Common



     (Triamcinol (Triamcinol 2-04                                              S

pirit



     one) one) 00:00:                                              - [facility]



               00                                                [facility]

 

     Hyalgan 20 Hyalgan 20 -      No             20mg                     C

ommon



     mg   mg   2-04                                              [facility]



               00:00:                                              - [facility]



               00                                                [facility]

 

     Bupivicaine Bupivicaine -      No             2.5mg                   

  Common



     Hydro Hydro 2-04                                              [facility]



               00:00:                                              - [facility]



               00                                                [facility]

 

     Bupivicaine Bupivicaine -      No             2.5mg                   

  Common



     Hydro Hydro 2-04                                              [facility]



               00:00:                                              - [facility]



               00                                                [facility]

 

     Kenalog Kenalog -      No             40mg                     Common



     (Triamcinol (Triamcinol 2-04                                              S

pirit



     one) one) 00:00:                                              - [facility]



               00                                                [facility]

 

     Hyalgan 20 Hyalgan 20 -      No             20mg                     C

ommon



     mg   mg   2-04                                              [facility]



               00:00:                                              - [facility]



               00                                                [facility]

 

     Kenalog Kenalog 2020      No             40mg                     Common



     (Triamcinol (Triamcinol 2-04                                              S

pirit



     one) one) 00:00:                                              - [facility]



               00                                                [facility]

 

     Hyalgan 20 Hyalgan 20 -      No             20mg                     C

ommon



     mg   mg   2-04                                              [facility]



               00:00:                                              - [facility]



               00                                                [facility]

 

     Bupivicaine Bupivicaine -      No             2.5mg                   

  Common



     Hydro Hydro 2-04                                              [facility]



               00:00:                                              - [facility]



               00                                                [facility]

 

     Bupivicaine Bupivicaine -      No             2.5mg                   

  Common



     Hydro Hydro 2-04                                              [facility]



               00:00:                                              - [facility]



               00                                                [facility]

 

     Kenalog Kenalog 2020      No             40mg                     Common



     (Triamcinol (Triamcinol 2-04                                              S

pirit



     one) one) 00:00:                                              - [facility]



               00                                                [facility]

 

     Hyalgan 20 Hyalgan 20 2020      No             20mg                     C

ommon



     mg   mg   2-04                                              [facility]



               00:00:                                              - [facility]



               00                                                [facility]

 

     Kenalog Kenalog 2020      No             40mg                     Common



     (Triamcinol (Triamcinol 2-04                                              S

pirit



     one) one) 00:00:                                              - [facility]



               00                                                [facility]

 

     Hyalgan 20 Hyalgan 20 -      No             20mg                     C

ommon



     mg   mg   2-04                                              [facility]



               00:00:                                              - [facility]



               00                                                [facility]

 

     Bupivicaine Bupivicaine -      No             2.5mg                   

  Common



     Hydro Hydro 2-04                                              [facility]



               00:00:                                              - [facility]



               00                                                [facility]

 

     Bupivicaine Bupivicaine -      No             2.5mg                   

  Common



     Hydro Hydro 2-04                                              [facility]



               00:00:                                              - [facility]



               00                                                [facility]

 

     Kenalog Kenalog -      No             40mg                     Common



     (Triamcinol (Triamcinol 2-04                                              S

pirit



     one) one) 00:00:                                              - [facility]



               00                                                [facility]

 

     Hyalgan 20 Hyalgan 20 -      No             20mg                     C

ommon



     mg   mg   2-04                                              [facility]



               00:00:                                              - [facility]



               00                                                [facility]

 

     Kenalog Kenalog -      No             40mg                     Common



     (Triamcinol (Triamcinol 2-04                                              S

pirit



     one) one) 00:00:                                              - [facility]



               00                                                [facility]

 

     Hyalgan 20 Hyalgan 20 -1      No             20mg                     C

ommon



     mg   mg   2-04                                              [facility]



               00:00:                                              - [facility]



               00                                                [facility]

 

     Bupivicaine Bupivicaine -      No             2.5mg                   

  Common



     Hydro Hydro 2-04                                              [facility]



               00:00:                                              - [facility]



               00                                                [facility]

 

     Bupivicaine Bupivicaine -      No             2.5mg                   

  Common



     Hydro Hydro 2-04                                              [facility]



               00:00:                                              - [facility]



               00                                                [facility]

 

     Kenalog Kenalog -      No             40mg                     Common



     (Triamcinol (Triamcinol 2-04                                              S

pirit



     one) one) 00:00:                                              - [facility]



               00                                                [facility]

 

     Hyalgan 20 Hyalgan 20 -      No             20mg                     C

ommon



     mg   mg   2-04                                              [facility]



               00:00:                                              - [facility]



               00                                                [facility]

 

     Kenalog Kenalog -      No             40mg                     Common



     (Triamcinol (Triamcinol 2-04                                              S

pirit



     one) one) 00:00:                                              - [facility]



               00                                                [facility]

 

     Hyalgan 20 Hyalgan 20 -      No             20mg                     C

ommon



     mg   mg   2-04                                              [facility]



               00:00:                                              - [facility]



               00                                                [facility]

 

     Bupivicaine Bupivicaine -      No             2.5mg                   

  Common



     Hydro Hydro 2-04                                              [facility]



               00:00:                                              - [facility]



               00                                                [facility]

 

     Bupivicaine Bupivicaine -      No             2.5mg                   

  Common



     Hydro Hydro 2-04                                              [facility]



               00:00:                                              - [facility]



               00                                                [facility]

 

     Kenalog Kenalog -      No             40mg                     Common



     (Triamcinol (Triamcinol 2-04                                              S

pirit



     one) one) 00:00:                                              - [facility]



               00                                                [facility]

 

     Hyalgan 20 Hyalgan 20 -1      No             20mg                     C

ommon



     mg   mg   2-04                                              [facility]



               00:00:                                              - [facility]



               00                                                [facility]

 

     Kenalog Kenalog -      No             40mg                     Common



     (Triamcinol (Triamcinol 2-04                                              S

pirit



     one) one) 00:00:                                              - [facility]



               00                                                [facility]

 

     Hyalgan 20 Hyalgan 20 -1      No             20mg                     C

ommon



     mg   mg   2-04                                              [facility]



               00:00:                                              - [facility]



               00                                                [facility]

 

     Bupivicaine Bupivicaine 2020-1      No             2.5mg                   

  Common



     Hydro Hydro 2-04                                              [facility]



               00:00:                                              - [facility]



               00                                                [facility]

 

     Bupivicaine Bupivicaine -      No             2.5mg                   

  Common



     Hydro Hydro 2-04                                              [facility]



               00:00:                                              - [facility]



               00                                                [facility]

 

     Kenalog Kenalog -      No             40mg                     Common



     (Triamcinol (Triamcinol 2-04                                              S

pirit



     one) one) 00:00:                                              - [facility]



               00                                                [facility]

 

     Hyalgan 20 Hyalgan 20 -      No             20mg                     C

ommon



     mg   mg   2-04                                              [facility]



               00:00:                                              - [facility]



               00                                                [facility]

 

     Kenalog Kenalog -      No             40mg                     Common



     (Triamcinol (Triamcinol 2-04                                              S

pirit



     one) one) 00:00:                                              - [facility]



               00                                                [facility]

 

     Hyalgan 20 Hyalgan 20 -      No             20mg                     C

ommon



     mg   mg   2-04                                              [facility]



               00:00:                                              - [facility]



               00                                                [facility]

 

     Bupivicaine Bupivicaine -      No             2.5mg                   

  Common



     Hydro Hydro 2-04                                              [facility]



               00:00:                                              - [facility]



               00                                                [facility]

 

     Bupivicaine Bupivicaine -      No             2.5mg                   

  Common



     Hydro Hydro 2-04                                              [facility]



               00:00:                                              - [facility]



               00                                                [facility]

 

     Kenalog Kenalog -      No             40mg                     Common



     (Triamcinol (Triamcinol 2-04                                              S

pirit



     one) one) 00:00:                                              - [facility]



               00                                                [facility]

 

     Hyalgan 20 Hyalgan 20 -      No             20mg                     C

ommon



     mg   mg   2-04                                              [facility]



               00:00:                                              - [facility]



               00                                                [facility]

 

     Kenalog Kenalog -      No             40mg                     Common



     (Triamcinol (Triamcinol 2-04                                              S

pirit



     one) one) 00:00:                                              - [facility]



               00                                                [facility]

 

     Hyalgan 20 Hyalgan 20 -      No             20mg                     C

ommon



     mg   mg   2-04                                              [facility]



               00:00:                                              - [facility]



               00                                                [facility]

 

     Bupivicaine Bupivicaine -      No             2.5mg                   

  Common



     Hydro Hydro 2-04                                              [facility]



               00:00:                                              - [facility]



               00                                                [facility]

 

     Bupivicaine Bupivicaine -      No             2.5mg                   

  Common



     Hydro Hydro 2-04                                              [facility]



               00:00:                                              - [facility]



               00                                                [facility]

 

     Kenalog Kenalog -      No             40mg                     Common



     (Triamcinol (Triamcinol 2-04                                              S

pirit



     one) one) 00:00:                                              - [facility]



               00                                                [facility]

 

     Hyalgan 20 Hyalgan 20 -      No             20mg                     C

ommon



     mg   mg   2-04                                              [facility]



               00:00:                                              - [facility]



               00                                                [facility]

 

     Kenalog Kenalog -      No             40mg                     Common



     (Triamcinol (Triamcinol 2-04                                              S

pirit



     one) one) 00:00:                                              - [facility]



               00                                                [facility]

 

     Hyalgan 20 Hyalgan 20 -      No             20mg                     C

ommon



     mg   mg   2-04                                              [facility]



               00:00:                                              - [facility]



               00                                                [facility]

 

     Bupivicaine Bupivicaine -      No             2.5mg                   

  Common



     Hydro Hydro 2-04                                              [facility]



               00:00:                                              - [facility]



               00                                                [facility]

 

     Bupivicaine Bupivicaine -      No             2.5mg                   

  Common



     Hydro Hydro 2-04                                              [facility]



               00:00:                                              - [facility]



               00                                                [facility]

 

     Kenalog Kenalog -      No             40mg                     Common



     (Triamcinol (Triamcinol 2-04                                              S

pirit



     one) one) 00:00:                                              - [facility]



               00                                                [facility]

 

     Hyalgan 20 Hyalgan 20 -      No             20mg                     C

ommon



     mg   mg   2-04                                              [facility]



               00:00:                                              - [facility]



               00                                                [facility]

 

     Kenalog Kenalog -      No             40mg                     Common



     (Triamcinol (Triamcinol 2-04                                              S

pirit



     one) one) 00:00:                                              - [facility]



               00                                                [facility]

 

     Hyalgan 20 Hyalgan 20 -      No             20mg                     C

ommon



     mg   mg   2-04                                              [facility]



               00:00:                                              - [facility]



               00                                                [facility]

 

     Bupivicaine Bupivicaine -      No             2.5mg                   

  Common



     Hydro Hydro 2-04                                              [facility]



               00:00:                                              - [facility]



               00                                                [facility]

 

     Bupivicaine Bupivicaine -      No             2.5mg                   

  Common



     Hydro Hydro 2-04                                              [facility]



               00:00:                                              - [facility]



               00                                                [facility]

 

     Kenalog Kenalog -      No             40mg                     Common



     (Triamcinol (Triamcinol 2-04                                              S

pirit



     one) one) 00:00:                                              - [facility]



               00                                                [facility]

 

     Hyalgan 20 Hyalgan 20 -      No             20mg                     C

ommon



     mg   mg   2-04                                              [facility]



               00:00:                                              - [facility]



               00                                                [facility]

 

     Kenalog Kenalog -      No             40mg                     Common



     (Triamcinol (Triamcinol 2-04                                              S

pirit



     one) one) 00:00:                                              - [facility]



               00                                                [facility]

 

     Hyalgan 20 Hyalgan 20 -      No             20mg                     C

ommon



     mg   mg   2-04                                              [facility]



               00:00:                                              - [facility]



               00                                                [facility]

 

     Bupivicaine Bupivicaine -      No             2.5mg                   

  Common



     Hydro Hydro 2-04                                              [facility]



               00:00:                                              - [facility]



               00                                                [facility]

 

     Bupivicaine Bupivicaine -      No             2.5mg                   

  Common



     Hydro Hydro 2-04                                              [facility]



               00:00:                                              - [facility]



               00                                                [facility]

 

     Kenalog Kenalog -      No             40mg                     Common



     (Triamcinol (Triamcinol 2-04                                              S

pirit



     one) one) 00:00:                                              - [facility]



               00                                                [facility]

 

     Hyalgan 20 Hyalgan 20 -      No             20mg                     C

ommon



     mg   mg   2-04                                              [facility]



               00:00:                                              - [facility]



               00                                                [facility]

 

     Kenalog Kenalog -      No             40mg                     Common



     (Triamcinol (Triamcinol 2-04                                              S

pirit



     one) one) 00:00:                                              - [facility]



               00                                                [facility]

 

     Hyalgan 20 Hyalgan 20 -      No             20mg                     C

ommon



     mg   mg   2-04                                              [facility]



               00:00:                                              - [facility]



               00                                                [facility]

 

     Bupivicaine Bupivicaine -      No             2.5mg                   

  Common



     Hydro Hydro 2-04                                              [facility]



               00:00:                                              - [facility]



               00                                                [facility]

 

     Bupivicaine Bupivicaine -      No             2.5mg                   

  Common



     Hydro Hydro 2-04                                              [facility]



               00:00:                                              - [facility]



               [facility]                                                [facility]

 

     Kenalog Kenalog -      No             40mg                     Common



     (Triamcinol (Triamcinol 2-04                                              S

pirit



     one) one) 00:00:                                              - [facility]



               [facility]                                                [facility]

 

     Hyalgan 20 Hyalgan 20 -      No             20mg                     C

ommon



     mg   mg   2-04                                              [facility]



               00:00:                                              - [facility]



               00                                                [facility]

 

     Kenalog Kenalog -      No             40mg                     Common



     (Triamcinol (Triamcinol 2-04                                              S

pirit



     one) one) 00:00:                                              - [facility]



               00                                                [facility]

 

     Hyalgan 20 Hyalgan 20 -1      No             20mg                     C

ommon



     mg   mg   2-04                                              Sp[facility]



               00:00:                                              - [facility]



               00                                                [facility]

 

     Bupivicaine Bupivicaine -      No             2.5mg                   

  Common



     Hydro Hydro 2-04                                              [facility]



               00:00:                                              - [facility]



               [facility]                                                [facility]

 

     Bupivicaine Bupivicaine -      No             2.5mg                   

  Common



     Hydro Hydro 2-04                                              [facility]



               00:00:                                              - [facility]



               [facility]                                                [facility]

 

     Kenalog Kenalog -      No             40mg                     Common



     (Triamcinol (Triamcinol 2-04                                              S

pirit



     one) one) 00:00:                                              - [facility]



               [facility]                                                [facility]

 

     Hyalgan 20 Hyalgan 20 -      No             20mg                     C

ommon



     mg   mg   2-04                                              Sp[facility]



               00:00:                                              - [facility]



               [facility]                                                [facility]

 

     Kenalog Kenalog -      No             40mg                     Common



     (Triamcinol (Triamcinol 2-04                                              S

pirit



     one) one) 00:00:                                              - [facility]



               [facility]                                                [facility]

 

     Hyalgan 20 Hyalgan 20 -      No             20mg                     C

ommon



     mg   mg   2-04                                              [facility]



               00:00:                                              - [facility]



               [facility]                                                [facility]

 

     Bupivicaine Bupivicaine -      No             2.5mg                   

  Common



     Hydro Hydro 2-04                                              [facility]



               00:00:                                              - [facility]



               [facility]                                                [facility]

 

     Bupivicaine Bupivicaine -      No             2.5mg                   

  Common



     Hydro Hydro 2-04                                              [facility]



               00:00:                                              - [facility]



               [facility]                                                [facility]

 

     Kenalog Kenalog -      No             40mg                     Common



     (Triamcinol (Triamcinol 2-04                                              S

pirit



     one) one) 00:00:                                              - [facility]



               [facility]                                                [facility]

 

     Hyalgan 20 Hyalgan 20 -      No             20mg                     C

ommon



     mg   mg   2-04                                              [facility]



               00:00:                                              - [facility]



               [facility]                                                [facility]

 

     Kenalog Kenalog -      No             40mg                     Common



     (Triamcinol (Triamcinol 2-04                                              S

pirit



     one) one) 00:00:                                              - [facility]



               [facility]                                                [facility]

 

     Hyalgan 20 Hyalgan 20 -1      No             20mg                     C

ommon



     mg   mg   2-04                                              [facility]



               00:00:                                              - [facility]



               [facility]                                                [facility]

 

     Bupivicaine Bupivicaine -      No             2.5mg                   

  Common



     Hydro Hydro 2-04                                              [facility]



               00:00:                                              - [facility]



               [facility]                                                [facility]

 

     Bupivicaine Bupivicaine 2020-      No             2.5mg                   

  Common



     Hydro Hydro 2-04                                              [facility]



               00:00:                                              - [facility]



               [facility]                                                [facility]

 

     Kenalog Kenalog -      No             40mg                     Common



     (Triamcinol (Triamcinol 2-04                                              S

pirit



     one) one) 00:00:                                              - [facility]



               [facility]                                                [facility]

 

     Hyalgan 20 Hyalgan 20 -      No             20mg                     C

ommon



     mg   mg   2-04                                              [facility]



               00:00:                                              - [facility]



               [facility]                                                [facility]

 

     Kenalog Kenalog -      No             40mg                     Common



     (Triamcinol (Triamcinol 2-04                                              S

pirit



     one) one) 00:00:                                              - [facility]



               [facility]                                                [facility]

 

     Hyalgan 20 Hyalgan 20 -      No             20mg                     C

ommon



     mg   mg   2-04                                              [facility]



               00:00:                                              - [facility]



               [facility]                                                [facility]

 

     Bupivicaine Bupivicaine -      No                                     

 Common



     Hydro Hydro 2-04                                              [facility]



               00:00:                                              - [facility]



               [facility]                                                [facility]

 

     Bupivicaine Bupivicaine -      No                                     

 Common



     Hydro Hydro 2-04                                              [facility]



               00:00:                                              - [facility]



               [facility]                                                [facility]

 

     Kenalog Kenalog -      No             40mg                     Common



     (Triamcinol (Triamcinol 2-04                                              S

pirit



     one) one) 00:00:                                              - [facility]



               [facility]                                                [facility]

 

     Hyalgan 20 Hyalgan 20 -      No             20mg                     C

ommon



     mg   mg   2-04                                              [facility]



               00:00:                                              - [facility]



               [facility]                                                [facility]

 

     Kenalog Kenalog -      No             40mg                     Common



     (Triamcinol (Triamcinol 2-04                                              S

pirit



     one) one) 00:00:                                              - [facility]



               [facility]                                                [facility]

 

     Hyalgan 20 Hyalgan 20 2020-      No             20mg                     C

ommon



     mg   mg   2-04                                              [facility]



               00:00:                                              - [facility]



               [facility]                                                [facility]

 

     Bupivicaine Bupivicaine 2020-      No                                     

 Common



     Hydro Hydro 2-04                                              [facility]



               00:00:                                              - [facility]



               [facility]                                                [facility]

 

     Bupivicaine Bupivicaine 2020-      No                                     

 Common



     Hydro Hydro 2-04                                              [facility]



               00:00:                                              - [facility]



               [facility]                                                [facility]

 

     Kenalog Kenalog -      No             40mg                     Common



     (Triamcinol (Triamcinol 2-04                                              S

pirit



     one) one) 00:00:                                              - [facility]



               [facility]                                                [facility]

 

     Hyalgan 20 Hyalgan 20 -1      No             20mg                     C

ommon



     mg   mg   2-04                                              [facility]



               00:00:                                              - [facility]



               [facility]                                                [facility]

 

     Kenalog Kenalog -      No             40mg                     Common



     (Triamcinol (Triamcinol 2-04                                              S

pirit



     one) one) 00:00:                                              - [facility]



               [facility]                                                [facility]

 

     Hyalgan 20 Hyalgan 20 -      No             20mg                     C

ommon



     mg   mg   2-04                                              [facility]



               00:00:                                              - [facility]



               [facility]                                                [facility]

 

     Bupivicaine Bupivicaine -      No                                     

 Common



     Hydro Hydro 2-04                                              [facility]



               00:00:                                              - [facility]



               [facility]                                                [facility]

 

     Bupivicaine Bupivicaine -      No                                     

 Common



     Hydro Hydro 2-04                                              [facility]



               00:00:                                              - [facility]



               [facility]                                                [facility]

 

     Kenalog Kenalog -      No             40mg                     Common



     (Triamcinol (Triamcinol 2-04                                              S

pirit



     one) one) 00:00:                                              - [facility]



               [facility]                                                [facility]

 

     Hyalgan 20 Hyalgan 20 -      No             20mg                     C

ommon



     mg   mg   2-04                                              [facility]



               00:00:                                              - [facility]



               [facility]                                                [facility]

 

     Kenalog Kenalog -      No             40mg                     Common



     (Triamcinol (Triamcinol 2-04                                              S

pirit



     one) one) 00:00:                                              - [facility]



               [facility]                                                [facility]

 

     Hyalgan 20 Hyalgan 20 -1      No             20mg                     C

ommon



     mg   mg   2-04                                              [facility]



               00:00:                                              - [facility]



               [facility]                                                [facility]

 

     Bupivicaine Bupivicaine -      No                                     

 Common



     Hydro Hydro 2-04                                              [facility]



               00:00:                                              - [facility]



               [facility]                                                [facility]

 

     Bupivicaine Bupivicaine -      No                                     

 Common



     Hydro Hydro 2-04                                              [facility]



               00:00:                                              - [facility]



               [facility]                                                [facility]

 

     Kenalog Kenalog -      No             40mg                     Common



     (Triamcinol (Triamcinol 2-04                                              S

pirit



     one) one) 00:00:                                              - [facility]



               [facility]                                                [facility]

 

     Hyalgan 20 Hyalgan 20 -1      No             20mg                     C

ommon



     mg   mg   2-04                                              [facility]



               00:00:                                              - [facility]



               [facility]                                                [facility]

 

     Kenalog Kenalog -      No             40mg                     Common



     (Triamcinol (Triamcinol 2-04                                              S

pirit



     one) one) 00:00:                                              - [facility]



               [facility]                                                [facility]

 

     Hyalgan 20 Hyalgan 20 -      No             20mg                     C

ommon



     mg   mg   2-04                                              [facility]



               00:00:                                              - [facility]



               [facility]                                                [facility]

 

     Bupivicaine Bupivicaine -      No                                     

 Common



     Hydro Hydro 2-04                                              [facility]



               00:00:                                              - [facility]



               [facility]                                                [facility]

 

     Bupivicaine Bupivicaine -      No                                     

 Common



     Hydro Hydro 2-04                                              [facility]



               00:00:                                              - [facility]



               [facility]                                                [facility]

 

     Kenalog Kenalog 2020      No             40mg                     Common



     (Triamcinol (Triamcinol 2-04                                              S

pirit



     one) one) 00:00:                                              - [facility]



               [facility]                                                [facility]

 

     Hyalgan 20 Hyalgan 20 -      No             20mg                     C

ommon



     mg   mg   2-04                                              [facility]



               00:00:                                              - [facility]



               [facility]                                                [facility]

 

     Kenalog Kenalog -      No             40mg                     Common



     (Triamcinol (Triamcinol 2-04                                              S

pirit



     one) one) 00:00:                                              - [facility]



               [facility]                                                [facility]

 

     Hyalgan 20 Hyalgan 20 -      No             20mg                     C

ommon



     mg   mg   2-04                                              [facility]



               00:00:                                              - [facility]



               [facility]                                                [facility]

 

     Bupivicaine Bupivicaine -      No                                     

 Common



     Hydro Hydro 2-04                                              [facility]



               00:00:                                              - [facility]



               [facility]                                                [facility]

 

     Bupivicaine Bupivicaine -      No                                     

 Common



     Hydro Hydro 2-04                                              [facility]



               00:00:                                              - [facility]



               [facility]                                                [facility]

 

     Kenalog Kenalog -      No             40mg                     Common



     (Triamcinol (Triamcinol 2-04                                              S

pirit



     one) one) 00:00:                                              - [facility]



               [facility]                                                [facility]

 

     Hyalgan 20 Hyalgan 20 2020-      No             20mg                     C

ommon



     mg   mg   2-04                                              [facility]



               00:00:                                              - [facility]



               [facility]                                                [facility]

 

     Kenalog Kenalog -      No             40mg                     Common



     (Triamcinol (Triamcinol 2-04                                              S

pirit



     one) one) 00:00:                                              - [facility]



               [facility]                                                [facility]

 

     Hyalgan 20 Hyalgan 20 -      No             20mg                     C

ommon



     mg   mg   2-04                                              [facility]



               00:00:                                              - [facility]



               [facility]                                                [facility]

 

     Bupivicaine Bupivicaine -      No                                     

 Common



     Hydro Hydro 2-04                                              [facility]



               00:00:                                              - [facility]



               [facility]                                                [facility]

 

     Bupivicaine Bupivicaine -      No                                     

 Common



     Hydro Hydro 2-04                                              [facility]



               00:00:                                              - [facility]



               [facility]                                                [facility]

 

     Kenalog Kenalog -      No             40mg                     Common



     (Triamcinol (Triamcinol 2-04                                              S

pirit



     one) one) 00:00:                                              - [facility]



               [facility]                                                [facility]

 

     Hyalgan 20 Hyalgan 20 -      No             20mg                     C

ommon



     mg   mg   2-04                                              [facility]



               00:00:                                              - [facility]



               [facility]                                                [facility]

 

     Kenalog Kenalog -      No             40mg                     Common



     (Triamcinol (Triamcinol 2-04                                              S

pirit



     one) one) 00:00:                                              - [facility]



               [facility]                                                [facility]

 

     Hyalgan 20 Hyalgan 20 -      No             20mg                     C

ommon



     mg   mg   2-04                                              [facility]



               00:00:                                              - [facility]



               [facility]                                                [facility]

 

     Bupivicaine Bupivicaine -      No                                     

 Common



     Hydro Hydro 2-04                                              [facility]



               00:00:                                              - [facility]



               [facility]                                                [facility]

 

     Bupivicaine Bupivicaine -      No                                     

 Common



     Hydro Hydro 2-04                                              [facility]



               00:00:                                              - [facility]



               [facility]                                                [facility]

 

     Kenalog Kenalog -      No             40mg                     Common



     (Triamcinol (Triamcinol 2-04                                              S

pirit



     one) one) 00:00:                                              - [facility]



               00                                                [facility]

 

     Hyalgan 20 Hyalgan 20 -      No             20mg                     C

ommon



     mg   mg   2-04                                              [facility]



               00:00:                                              - [facility]



               [facility]                                                [facility]

 

     Kenalog Kenalog -      No             40mg                     Common



     (Triamcinol (Triamcinol 2-04                                              S

pirit



     one) one) 00:00:                                              - [facility]



               00                                                [facility]

 

     Hyalgan 20 Hyalgan 20 2020-      No             20mg                     C

ommon



     mg   mg   2-04                                              [facility]



               00:00:                                              - [facility]



               [facility]                                                [facility]

 

     Bupivicaine Bupivicaine -      No                                     

 Common



     Hydro Hydro 2-04                                              [facility]



               00:00:                                              - [facility]



               [facility]                                                [facility]

 

     Bupivicaine Bupivicaine -      No                                     

 Common



     Hydro Hydro 2-04                                              [facility]



               00:00:                                              - [facility]



               [facility]                                                [facility]

 

     Kenalog Kenalog -      No             40mg                     Common



     (Triamcinol (Triamcinol 2-04                                              S

pirit



     one) one) 00:00:                                              - [facility]



               00                                                [facility]

 

     Hyalgan 20 Hyalgan 20 -      No             20mg                     C

ommon



     mg   mg   2-04                                              [facility]



               00:00:                                              - [facility]



               [facility]                                                [facility]

 

     Kenalog Kenalog -      No             40mg                     Common



     (Triamcinol (Triamcinol 2-04                                              S

pirit



     one) one) 00:00:                                              - [facility]



               [facility]                                                [facility]

 

     Hyalgan 20 Hyalgan 20 -      No             20mg                     C

ommon



     mg   mg   2-04                                              [facility]



               00:00:                                              - [facility]



               [facility]                                                [facility]

 

     Bupivicaine Bupivicaine -      No             2.5mg                   

  Common



     Hydro Hydro 2-04                                              [facility]



               00:00:                                              - [facility]



               [facility]                                                [facility]

 

     Bupivicaine Bupivicaine -      No             2.5mg                   

  Common



     Hydro Hydro 2-04                                              [facility]



               00:00:                                              - [facility]



               [facility]                                                [facility]

 

     Kenalog Kenalog -      No             40mg                     Common



     (Triamcinol (Triamcinol 2-04                                              S

pirit



     one) one) 00:00:                                              - [facility]



               [facility]                                                [facility]

 

     Hyalgan 20 Hyalgan 20 -      No             20mg                     C

ommon



     mg   mg   2-04                                              [facility]



               00:00:                                              - [facility]



               [facility]                                                [facility]

 

     Kenalog Kenalog -      No             40mg                     Common



     (Triamcinol (Triamcinol 2-04                                              S

pirit



     one) one) 00:00:                                              - [facility]



               [facility]                                                [facility]

 

     Hyalgan 20 Hyalgan 20 -      No             20mg                     C

ommon



     mg   mg   2-04                                              [facility]



               00:00:                                              - [facility]



               [facility]                                                [facility]

 

     Bupivicaine Bupivicaine -      No             2.5mg                   

  Common



     Hydro Hydro 2-04                                              [facility]



               00:00:                                              - [facility]



               [facility]                                                [facility]

 

     Bupivicaine Bupivicaine -      No             2.5mg                   

  Common



     Hydro Hydro 2-04                                              [facility]



               00:00:                                              - [facility]



               [facility]                                                [facility]

 

     Kenalog Kenalog -      No             40mg                     Common



     (Triamcinol (Triamcinol 2-04                                              S

pirit



     one) one) 00:00:                                              - [facility]



               [facility]                                                [facility]

 

     Hyalgan 20 Hyalgan 20 -      No             20mg                     C

ommon



     mg   mg   2-04                                              [facility]



               00:00:                                              - [facility]



               [facility]                                                [facility]

 

     Kenalog Kenalog 2020      No             40mg                     Common



     (Triamcinol (Triamcinol 2-04                                              S

pirit



     one) one) 00:00:                                              - [facility]



               [facility]                                                [facility]

 

     Hyalgan 20 Hyalgan 20 -      No             20mg                     C

ommon



     mg   mg   2-04                                              [facility]



               00:00:                                              - [facility]



               [facility]                                                [facility]

 

     Bupivicaine Bupivicaine -      No             2.5mg                   

  Common



     Hydro Hydro 2-04                                              [facility]



               00:00:                                              - [facility]



               [facility]                                                [facility]

 

     Bupivicaine Bupivicaine -      No             2.5mg                   

  Common



     Hydro Hydro 2-04                                              [facility]



               00:00:                                              - [facility]



               [facility]                                                [facility]

 

     Kenalog Kenalog -      No             40mg                     Common



     (Triamcinol (Triamcinol 2-04                                              S

pirit



     one) one) 00:00:                                              - [facility]



               [facility]                                                [facility]

 

     Hyalgan 20 Hyalgan 20 -      No             20mg                     C

ommon



     mg   mg   2-04                                              [facility]



               00:00:                                              - [facility]



               [facility]                                                [facility]

 

     Kenalog Kenalog 2020      No             40mg                     Common



     (Triamcinol (Triamcinol 2-04                                              S

pirit



     one) one) 00:00:                                              - [facility]



               [facility]                                                [facility]

 

     Hyalgan 20 Hyalgan 20 -      No             20mg                     C

ommon



     mg   mg   2-04                                              [facility]



               00:00:                                              - [facility]



               [facility]                                                [facility]

 

     Bupivicaine Bupivicaine -      No             2.5mg                   

  Common



     Hydro Hydro 2-04                                              [facility]



               00:00:                                              - [facility]



               [facility]                                                [facility]

 

     Bupivicaine Bupivicaine -      No             2.5mg                   

  Common



     Hydro Hydro 2-04                                              [facility]



               00:00:                                              - [facility]



               [facility]                                                [facility]

 

     Kenalog Kenalog -      No             40mg                     Common



     (Triamcinol (Triamcinol 2-04                                              S

pirit



     one) one) 00:00:                                              - [facility]



               [facility]                                                [facility]

 

     Hyalgan 20 Hyalgan 20 -      No             20mg                     C

ommon



     mg   mg   2-04                                              [facility]



               00:00:                                              - [facility]



               [facility]                                                [facility]

 

     Kenalog Kenalog -      No             40mg                     Common



     (Triamcinol (Triamcinol 2-04                                              S

pirit



     one) one) 00:00:                                              - [facility]



               [facility]                                                [facility]

 

     Hyalgan 20 Hyalgan 20 -      No             20mg                     C

ommon



     mg   mg   2-04                                              [facility]



               00:00:                                              - [facility]



               [facility]                                                [facility]

 

     Bupivicaine Bupivicaine -      No             2.5mg                   

  Common



     Hydro Hydro 2-04                                              [facility]



               00:00:                                              - [facility]



               [facility]                                                [facility]

 

     Bupivicaine Bupivicaine -      No             2.5mg                   

  Common



     Hydro Hydro 2-04                                              [facility]



               00:00:                                              - [facility]



               [facility]                                                [facility]

 

     Kenalog Kenalog 2020      No             40mg                     Common



     (Triamcinol (Triamcinol 2-04                                              S

pirit



     one) one) 00:00:                                              - [facility]



               [facility]                                                [facility]

 

     PredniSONE PredniSONE -0 2020- No   [place]                one tablet  

         Common



               7-15 07-25                          with food           [facility]



               00:00: 00:00                                         - [facility]



               00   :00                                          [facility]

 

     Kenalog Kenalog -      No             40mg                     Common



     (Triamcinol (Triamcinol 2-20                                              S

pirit



     one) one) 00:00:                                              - [facility]



               [facility]                                                [facility]

 

     Kenalog Kenalog -      No             40mg                     Common



     (Triamcinol (Triamcinol 2-20                                              S

pirit



     one) one) 00:00:                                              - [facility]



               [facility]                                                [facility]

 

     Kenalog Kenalog -      No             40mg                     Common



     (Triamcinol (Triamcinol 2-20                                              S

pirit



     one) one) 00:00:                                              - [facility]



               [facility]                                                [facility]

 

     Kenalog Kenalog -      No             40mg                     Common



     (Triamcinol (Triamcinol 2-20                                              S

pirit



     one) one) 00:00:                                              - [facility]



               [facility]                                                [facility]

 

     Kenalog Kenalog 2019-      No             40mg                     Common



     (Triamcinol (Triamcinol 2-20                                              S

pirit



     one) one) 00:00:                                              - [facility]



               [facility]                                                [facility]

 

     Kenalog Kenalog 2019-      No             40mg                     Common



     (Triamcinol (Triamcinol 2-20                                              S

pirit



     one) one) 00:00:                                              - [facility]



               [facility]                                                [facility]

 

     Kenalog Kenalog 2019-      No             40mg                     Common



     (Triamcinol (Triamcinol 2-20                                              S

pirit



     one) one) 00:00:                                              - [facility]



               [facility]                                                [facility]

 

     Kenalog Kenalog 2019-      No             40mg                     Common



     (Triamcinol (Triamcinol 2-20                                              S

pirit



     one) one) 00:00:                                              - [facility]



               [facility]                                                [facility]

 

     Kenalog Kenalog 2019-1      No             40mg                     Common



     (Triamcinol (Triamcinol 2-20                                              S

pirit



     one) one) 00:00:                                              - [facility]



               [facility]                                                [facility]

 

     Kenalog Kenalog 2019-      No             40mg                     Common



     (Triamcinol (Triamcinol 2-20                                              S

pirit



     one) one) 00:00:                                              - [facility]



               [facility]                                                [facility]

 

     Kenalog Kenalog 2019-      No             40mg                     Common



     (Triamcinol (Triamcinol 2-20                                              S

pirit



     one) one) 00:00:                                              - [facility]



               [facility]                                                [facility]

 

     Kenalog Kenalog 2019-      No             40mg                     Common



     (Triamcinol (Triamcinol 2-20                                              S

pirit



     one) one) 00:00:                                              - [facility]



               [facility]                                                [facility]

 

     Kenalog Kenalog 2019-      No             40mg                     Common



     (Triamcinol (Triamcinol 2-20                                              S

pirit



     one) one) 00:00:                                              - [facility]



               [facility]                                                [facility]

 

     Kenalog Kenalog 2019-      No             40mg                     Common



     (Triamcinol (Triamcinol 2-20                                              S

pirit



     one) one) 00:00:                                              - [facility]



               [facility]                                                [facility]

 

     Kenalog Kenalog 2019-      No             40mg                     Common



     (Triamcinol (Triamcinol 2-20                                              S

pirit



     one) one) 00:00:                                              - [facility]



               [facility]                                                [facility]

 

     Kenalog Kenalog 2019-1      No             40mg                     Common



     (Triamcinol (Triamcinol 2-20                                              S

pirit



     one) one) 00:00:                                              - [facility]



               [facility]                                                [facility]

 

     Kenalog Kenalog 2019-1      No             40mg                     Common



     (Triamcinol (Triamcinol 2-20                                              S

pirit



     one) one) 00:00:                                              - [facility]



               [facility]                                                [facility]

 

     Kenalog Kenalog 2019-1      No             40mg                     Common



     (Triamcinol (Triamcinol 2-20                                              S

pirit



     one) one) 00:00:                                              - [facility]



               [facility]                                                [facility]

 

     Kenalog Kenalog 2019-1      No             40mg                     Common



     (Triamcinol (Triamcinol 2-20                                              S

pirit



     one) one) 00:00:                                              - [facility]



               [facility]                                                [facility]

 

     Kenalog Kenalog 2019-1      No             40mg                     Common



     (Triamcinol (Triamcinol 2-20                                              S

pirit



     one) one) 00:00:                                              - [facility]



               [facility]                                                [facility]

 

     Kenalog Kenalog 2019-1      No             40mg                     Common



     (Triamcinol (Triamcinol 2-20                                              S

pirit



     one) one) 00:00:                                              - [facility]



               [facility]                                                [facility]

 

     Kenalog Kenalog 2019-1      No             40mg                     Common



     (Triamcinol (Triamcinol 2-20                                              S

pirit



     one) one) 00:00:                                              - [facility]



               [facility]                                                [facility]

 

     Kenalog Kenalog 2019-1      No             40mg                     Common



     (Triamcinol (Triamcinol 2-20                                              S

pirit



     one) one) 00:00:                                              - [facility]



               [facility]                                                [facility]

 

     Kenalog Kenalog 2019-      No             40mg                     Common



     (Triamcinol (Triamcinol 2-20                                              S

pirit



     one) one) 00:00:                                              - [facility]



               [facility]                                                [facility]

 

     Kenalog Kenalog 2019-      No             40mg                     Common



     (Triamcinol (Triamcinol 2-20                                              S

pirit



     one) one) 00:00:                                              - [facility]



               [facility]                                                [facility]

 

     Kenalog Kenalog 2019-      No             40mg                     Common



     (Triamcinol (Triamcinol 2-20                                              S

pirit



     one) one) 00:00:                                              - [facility]



               [facility]                                                [facility]

 

     Kenalog Kenalog 2019-1      No             40mg                     Common



     (Triamcinol (Triamcinol 2-20                                              S

pirit



     one) one) 00:00:                                              - [facility]



               [facility]                                                [facility]

 

     Kenalog Kenalog 2019-1      No             40mg                     Common



     (Triamcinol (Triamcinol 2-20                                              S

pirit



     one) one) 00:00:                                              - [facility]



               [facility]                                                [facility]

 

     Kenalog Kenalog 2019-1      No             40mg                     Common



     (Triamcinol (Triamcinol 2-20                                              S

pirit



     one) one) 00:00:                                              - [facility]



               [facility]                                                [facility]

 

     Kenalog Kenalog 2019-1      No             40mg                     Common



     (Triamcinol (Triamcinol 2-20                                              S

pirit



     one) one) 00:00:                                              - [facility]



               [facility]                                                [facility]

 

     Kenalog Kenalog 2019-1      No             40mg                     Common



     (Triamcinol (Triamcinol 2-20                                              S

pirit



     one) one) 00:00:                                              - [facility]



               [facility]                                                [facility]

 

     LIDOCAINE LIDOCAINE 2019-0      No             10mg                     Com

mon



     HCL 10MG/ML HCL 10MG/ML 5-                                              S

pirit



               00:00:                                              - [facility]



                                                               [facility]

 

     Hyalgan 20 Hyalgan 20 2019-0      No             20mg                     C

ommon



     mg   mg                                                 [facility]



               00:00:                                              - [facility]



                                                               [facility]

 

     Hyalgan 20 Hyalgan 20 2019-0      No             20mg                     C

ommon



     mg   mg                                                 [facility]



               00:00:                                              - [facility]



                                                               [facility]

 

     LIDOCAINE LIDOCAINE 2019-0      No             10mg                     Com

mon



     HCL 10MG/ML HCL 10MG/ML 5                                              S

pirit



               00:00:                                              - [facility]



                                                               [facility]

 

     LIDOCAINE LIDOCAINE 2019-0      No             10mg                     Com

mon



     HCL 10MG/ML HCL 10MG/ML 5                                              S

pirit



               00:00:                                              - [facility]



                                                               [facility]

 

     Hyalgan 20 Hyalgan 20 2019-0      No             20mg                     C

ommon



     mg   mg                                                 [facility]



               00:00:                                              - [facility]



                                                               [facility]

 

     Hyalgan 20 Hyalgan 20 2019-0      No             20mg                     C

ommon



     mg   mg                                                 [facility]



               00:00:                                              - [facility]



                                                               [facility]

 

     LIDOCAINE LIDOCAINE 2019-0      No             10mg                     Com

mon



     HCL 10MG/ML HCL 10MG/ML 5                                              S

pirit



               00:00:                                              - [facility]



                                                               [facility]

 

     LIDOCAINE LIDOCAINE 2019-0      No             10mg                     Com

mon



     HCL 10MG/ML HCL 10MG/ML 5                                              S

[facility]irit



               00:00:                                              - [facility]



                                                               [facility]

 

     Hyalgan 20 Hyalgan 20 2019-0      No             20mg                     C

ommon



     mg   mg                                                 [facility]



               00:00:                                              - [facility]



                                                               [facility]

 

     Hyalgan 20 Hyalgan 20 2019-0      No             20mg                     C

ommon



     mg   mg                                                 [facility]



               00:00:                                              - [facility]



                                                               [facility]

 

     LIDOCAINE LIDOCAINE 2019-0      No             10mg                     Com

mon



     HCL 10MG/ML HCL 10MG/ML 5                                              S

pirit



               00:00:                                              - [facility]



                                                               [facility]

 

     LIDOCAINE LIDOCAINE 2019-0      No             10mg                     Com

mon



     HCL 10MG/ML HCL 10MG/ML 5-                                              S

[facility]irit



               00:00:                                              - [facility]



                                                               [facility]

 

     Hyalgan 20 Hyalgan 20 2019-0      No             20mg                     C

ommon



     mg   mg   5                                              [facility]



               00:00:                                              - [facility]



                                                               [facility]

 

     Hyalgan 20 Hyalgan 20 2019-0      No             20mg                     C

ommon



     mg   mg                                                 [facility]



               00:00:                                              - [facility]



                                                               [facility]

 

     LIDOCAINE LIDOCAINE 2019-0      No             10mg                     Com

mon



     HCL 10MG/ML HCL 10MG/ML 5                                              S

pirit



               00:00:                                              - [facility]



                                                               [facility]

 

     LIDOCAINE LIDOCAINE 2019-0      No             10mg                     Com

mon



     HCL 10MG/ML HCL 10MG/ML 5                                              S

[facility]irit



               00:00:                                              - [facility]



                                                               [facility]

 

     Hyalgan 20 Hyalgan 20 2019-0      No             20mg                     C

ommon



     mg   mg                                                 [facility]



               00:00:                                              - [facility]



                                                               [facility]

 

     Hyalgan 20 Hyalgan 20 2019-0      No             20mg                     C

ommon



     mg   mg                                                 [facility]



               00:00:                                              - [facility]



                                                               [facility]

 

     LIDOCAINE LIDOCAINE 2019-0      No             10mg                     Com

mon



     HCL 10MG/ML HCL 10MG/ML 5                                              S

pirit



               00:00:                                              - [facility]



                                                               [facility]

 

     LIDOCAINE LIDOCAINE 2019-0      No             10mg                     Com

mon



     HCL 10MG/ML HCL 10MG/ML                                               S

[facility]irit



               00:00:                                              - [facility]



                                                               [facility]

 

     Hyalgan 20 Hyalgan 20 2019-0      No             20mg                     C

ommon



     mg   mg                                                 [facility]



               00:00:                                              - [facility]



                                                               [facility]

 

     Hyalgan 20 Hyalgan 20 2019-0      No             20mg                     C

ommon



     mg   mg                                                 [facility]



               00:00:                                              - [facility]



                                                               [facility]

 

     LIDOCAINE LIDOCAINE 2019-0      No             10mg                     Com

mon



     HCL 10MG/ML HCL 10MG/ML 5                                              S

pirit



               00:00:                                              - [facility]



                                                               [facility]

 

     LIDOCAINE LIDOCAINE 2019-0      No             10mg                     Com

mon



     HCL 10MG/ML HCL 10MG/ML 5                                              S

[facility]irit



               00:00:                                              - [facility]



                                                               [facility]

 

     Hyalgan 20 Hyalgan 20 2019-0      No             20mg                     C

ommon



     mg   mg                                                 [facility]



               00:00:                                              - [facility]



                                                               [facility]

 

     Hyalgan 20 Hyalgan 20 2019-0      No             20mg                     C

ommon



     mg   mg                                                 [facility]



               00:00:                                              - [facility]



                                                               [facility]

 

     LIDOCAINE LIDOCAINE 2019-0      No             10mg                     Com

mon



     HCL 10MG/ML HCL 10MG/ML 5                                              S

[facility]irit



               00:00:                                              - [facility]



                                                               [facility]

 

     LIDOCAINE LIDOCAINE 2019-0      No             10mg                     Com

mon



     HCL 10MG/ML HCL 10MG/ML 5-                                              S

[facility]irit



               00:00:                                              - [facility]



                                                               [facility]

 

     Hyalgan 20 Hyalgan 20 2019-0      No             20mg                     C

ommon



     mg   mg                                                 [facility]



               00:00:                                              - [facility]



                                                               [facility]

 

     Hyalgan 20 Hyalgan 20 2019-0      No             20mg                     C

ommon



     mg   mg                                                 [facility]



               00:00:                                              - [facility]



                                                               [facility]

 

     LIDOCAINE LIDOCAINE 2019-0      No             10mg                     Com

mon



     HCL 10MG/ML HCL 10MG/ML 5                                              S

pirit



               00:00:                                              - [facility]



                                                               [facility]

 

     LIDOCAINE LIDOCAINE 2019-0      No             10mg                     Com

mon



     HCL 10MG/ML HCL 10MG/ML                                               S

[facility]irit



               00:00:                                              - [facility]



                                                               [facility]

 

     Hyalgan 20 Hyalgan 20 2019-0      No             20mg                     C

ommon



     mg   mg                                                 [facility]



               00:00:                                              - [facility]



                                                               [facility]

 

     Hyalgan 20 Hyalgan 20 2019-0      No             20mg                     C

ommon



     mg   mg                                                 [facility]



               00:00:                                              - [facility]



                                                               [facility]

 

     LIDOCAINE LIDOCAINE 2019-0      No             10mg                     Com

mon



     HCL 10MG/ML HCL 10MG/ML 5                                              S

pirit



               00:00:                                              - [facility]



                                                               [facility]

 

     LIDOCAINE LIDOCAINE 2019-0      No             10mg                     Com

mon



     HCL 10MG/ML HCL 10MG/ML                                               S

[facility]irit



               00:00:                                              - [facility]



                                                               [facility]

 

     Hyalgan 20 Hyalgan 20 2019-0      No             20mg                     C

ommon



     mg   mg                                                 [facility]



               00:00:                                              - [facility]



                                                               [facility]

 

     Hyalgan 20 Hyalgan 20 2019-0      No             20mg                     C

ommon



     mg   mg                                                 [facility]



               00:00:                                              - [facility]



                                                               [facility]

 

     LIDOCAINE LIDOCAINE 2019-0      No             10mg                     Com

mon



     HCL 10MG/ML HCL 10MG/ML 5                                              S

[facility]irit



               00:00:                                              - [facility]



                                                               [facility]

 

     LIDOCAINE LIDOCAINE 2019-0      No             10mg                     Com

mon



     HCL 10MG/ML HCL 10MG/ML 5-                                              S

[facility]irit



               00:00:                                              - [facility]



                                                               [facility]

 

     Hyalgan 20 Hyalgan 20 2019-0      No             20mg                     C

ommon



     mg   mg                                                 [facility]



               00:00:                                              - [facility]



                                                               [facility]

 

     Hyalgan 20 Hyalgan 20 2019-0      No             20mg                     C

ommon



     mg   mg                                                 [facility]



               00:00:                                              - [facility]



                                                               [facility]

 

     LIDOCAINE LIDOCAINE 2019-0      No             10mg                     Com

mon



     HCL 10MG/ML HCL 10MG/ML 5                                              S

[facility]irit



               00:00:                                              - [facility]



                                                               [facility]

 

     LIDOCAINE LIDOCAINE 2019-0      No             10mg                     Com

mon



     HCL 10MG/ML HCL 10MG/ML 5                                              S

[facility]irit



               00:00:                                              - [facility]



                                                               [facility]

 

     Hyalgan 20 Hyalgan 20 2019-0      No             20mg                     C

ommon



     mg   mg                                                 [facility]



               00:00:                                              - [facility]



                                                               [facility]

 

     Hyalgan 20 Hyalgan 20 2019-0      No             20mg                     C

ommon



     mg   mg                                                 [facility]



               00:00:                                              - [facility]



                                                               [facility]

 

     LIDOCAINE LIDOCAINE 2019-0      No             10mg                     Com

mon



     HCL 10MG/ML HCL 10MG/ML 5                                              S

pirit



               00:00:                                              - [facility]



                                                               [facility]

 

     LIDOCAINE LIDOCAINE 2019-0      No             10mg                     Com

mon



     HCL 10MG/ML HCL 10MG/ML                                               S

[facility]irit



               00:00:                                              - [facility]



                                                               [facility]

 

     Hyalgan 20 Hyalgan 20 2019-0      No             20mg                     C

ommon



     mg   mg                                                 [facility]



               00:00:                                              - [facility]



                                                               [facility]

 

     Hyalgan 20 Hyalgan 20 2019-0      No             20mg                     C

ommon



     mg   mg                                                 [facility]



               00:00:                                              - [facility]



                                                               [facility]

 

     LIDOCAINE LIDOCAINE 2019-0      No             10mg                     Com

mon



     HCL 10MG/ML HCL 10MG/ML 5                                              S

[facility]irit



               00:00:                                              - [facility]



                                                               [facility]

 

     LIDOCAINE LIDOCAINE 2019-0      No             10mg                     Com

mon



     HCL 10MG/ML HCL 10MG/ML 5                                              S

[facility]



               00:00:                                              - [facility]



                                                               [facility]

 

     Hyalgan 20 Hyalgan 20 2019-0      No             20mg                     C

ommon



     mg   mg                                                 [facility]



               00:00:                                              - [facility]



                                                               [facility]

 

     Hyalgan 20 Hyalgan 20 2019-0      No             20mg                     C

ommon



     mg   mg                                                 [facility]



               00:00:                                              - [facility]



                                                               [facility]

 

     LIDOCAINE LIDOCAINE 2019-0      No             10mg                     Com

mon



     HCL 10MG/ML HCL 10MG/ML 5                                              S

[facility]irit



               00:00:                                              - [facility]



                                                               [facility]

 

     LIDOCAINE LIDOCAINE 2019-0      No             10mg                     Com

mon



     HCL 10MG/ML HCL 10MG/ML 5                                              S

[facility]



               00:00:                                              - [facility]



                                                               [facility]

 

     Hyalgan 20 Hyalgan 20 2019-0      No             20mg                     C

ommon



     mg   mg                                                 [facility]



               00:00:                                              - [facility]



                                                               [facility]

 

     Hyalgan 20 Hyalgan 20 2019-0      No             20mg                     C

ommon



     mg   mg                                                 [facility]



               00:00:                                              - [facility]



                                                               [facility]

 

     LIDOCAINE LIDOCAINE 2019-0      No             10mg                     Com

mon



     HCL 10MG/ML HCL 10MG/ML 5                                              S

pirit



               00:00:                                              - [facility]



                                                               [facility]

 

     LIDOCAINE LIDOCAINE 2019-0      No             10mg                     Com

mon



     HCL 10MG/ML HCL 10MG/ML                                               S

[facility]



               00:00:                                              - [facility]



                                                               [facility]

 

     Hyalgan 20 Hyalgan 20 2019-0      No             20mg                     C

ommon



     mg   mg                                                 [facility]



               00:00:                                              - [facility]



                                                               [facility]

 

     Hyalgan 20 Hyalgan 20 2019-0      No             20mg                     C

ommon



     mg   mg                                                 [facility]



               00:00:                                              - [facility]



                                                               [facility]

 

     LIDOCAINE LIDOCAINE 2019-0      No             10mg                     Com

mon



     HCL 10MG/ML HCL 10MG/ML 5                                              S

[facility]irit



               00:00:                                              - [facility]



                                                               [facility]

 

     LIDOCAINE LIDOCAINE 2019-0      No             10mg                     Com

mon



     HCL 10MG/ML HCL 10MG/ML                                               S

[facility]irit



               00:00:                                              - [facility]



                                                               [facility]

 

     Hyalgan 20 Hyalgan 20 2019-0      No             20mg                     C

ommon



     mg   mg                                                 [facility]



               00:00:                                              - [facility]



                                                               [facility]

 

     Hyalgan 20 Hyalgan 20 2019-0      No             20mg                     C

ommon



     mg   mg                                                 [facility]



               00:00:                                              - [facility]



                                                               [facility]

 

     LIDOCAINE LIDOCAINE 2019-0      No             10mg                     Com

mon



     HCL 10MG/ML HCL 10MG/ML 5                                              S

[facility]irit



               00:00:                                              - [facility]



                                                               [facility]

 

     LIDOCAINE LIDOCAINE 2019-0      No             10mg                     Com

mon



     HCL 10MG/ML HCL 10MG/ML 5                                              S

[facility]



               00:00:                                              - [facility]



                                                               [facility]

 

     Hyalgan 20 Hyalgan 20 2019-0      No             20mg                     C

ommon



     mg   mg                                                 [facility]



               00:00:                                              - [facility]



                                                               [facility]

 

     Hyalgan 20 Hyalgan 20 2019-0      No             20mg                     C

ommon



     mg   mg                                                 [facility]



               00:00:                                              - [facility]



                                                               [facility]

 

     LIDOCAINE LIDOCAINE 2019-0      No             10mg                     Com

mon



     HCL 10MG/ML HCL 10MG/ML 5                                              S

[facility]irit



               00:00:                                              - [facility]



                                                               [facility]

 

     LIDOCAINE LIDOCAINE 2019-0      No             10mg                     Com

mon



     HCL 10MG/ML HCL 10MG/ML 5                                              S

[facility]irit



               00:00:                                              - [facility]



                                                               [facility]

 

     Hyalgan 20 Hyalgan 20 2019-0      No             20mg                     C

ommon



     mg   mg                                                 [facility]



               00:00:                                              - [facility]



                                                               [facility]

 

     Hyalgan 20 Hyalgan 20 2019-0      No             20mg                     C

ommon



     mg   mg                                                 [facility]



               00:00:                                              - [facility]



                                                               [facility]

 

     LIDOCAINE LIDOCAINE 2019-0      No             10mg                     Com

mon



     HCL 10MG/ML HCL 10MG/ML                                               S

[facility]irit



               00:00:                                              - [facility]



                                                               [facility]

 

     LIDOCAINE LIDOCAINE 2019-0      No             10mg                     Com

mon



     HCL 10MG/ML HCL 10MG/ML                                               S

[facility]irit



               00:00:                                              - [facility]



                                                               [facility]

 

     Hyalgan 20 Hyalgan 20 2019-0      No             20mg                     C

ommon



     mg   mg                                                 [facility]



               00:00:                                              - [facility]



                                                               [facility]

 

     Hyalgan 20 Hyalgan 20 2019-0      No             20mg                     C

om[facility]



     mg   mg                                                 [facility]



               00:00:                                              - [facility]



                                                               [facility]

 

     LIDOCAINE LIDOCAINE 2019-0      No             10mg                     Com

mon



     HCL 10MG/ML HCL 10MG/ML 5                                              S

[facility]irit



               00:00:                                              - [facility]



                                                               [facility]

 

     LIDOCAINE LIDOCAINE 2019-0      No             10mg                     Com

mon



     HCL 10MG/ML HCL 10MG/ML 5                                              S

[facility]irit



               00:00:                                              - [facility]



                                                               [facility]

 

     Hyalgan 20 Hyalgan 20 2019-0      No             20mg                     C

ommon



     mg   mg                                                 [facility]



               00:00:                                              - [facility]



                                                               [facility]

 

     Hyalgan 20 Hyalgan 20 2019-0      No             20mg                     C

ommon



     mg   mg                                                 [facility]



               00:00:                                              - [facility]



                                                               [facility]

 

     LIDOCAINE LIDOCAINE 2019-0      No             10mg                     Com

mon



     HCL 10MG/ML HCL 10MG/ML 5-                                              S

[facility]irit



               00:00:                                              - [facility]



                                                               [facility]

 

     LIDOCAINE LIDOCAINE 2019-0      No             10mg                     Com

mon



     HCL 10MG/ML HCL 10MG/ML 5                                              S

[facility]



               00:00:                                              - [facility]



                                                               [facility]

 

     Hyalgan 20 Hyalgan 20 2019-0      No             20mg                     C

ommon



     mg   mg                                                 [facility]



               00:00:                                              - [facility]



                                                               [facility]

 

     Hyalgan 20 Hyalgan 20 2019-0      No             20mg                     C

[facility]mon



     mg   mg                                                 [facility]



               00:00:                                              - [facility]



                                                               [facility]

 

     LIDOCAINE LIDOCAINE 2019-0      No             10mg                     Com

mon



     HCL 10MG/ML HCL 10MG/ML 5                                              S

pirit



               00:00:                                              - [facility]



                                                               [facility]

 

     LIDOCAINE LIDOCAINE 2019-0      No             10mg                     Com

mon



     HCL 10MG/ML HCL 10MG/ML                                               S

[facility]



               00:00:                                              - [facility]



                                                               [facility]

 

     Hyalgan 20 Hyalgan 20 2019-0      No             20mg                     C

[facility]



     mg   mg                                                 [facility]



               00:00:                                              - [facility]



                                                               [facility]

 

     Hyalgan 20 Hyalgan 20 2019-0      No             20mg                     C

om[facility]



     mg   mg                                                 [facility]



               00:00:                                              - [facility]



                                                               [facility]

 

     LIDOCAINE LIDOCAINE 2019-0      No             10mg                     Com

mon



     HCL 10MG/ML HCL 10MG/ML 5                                              S

pirit



               00:00:                                              - [facility]



                                                               [facility]

 

     LIDOCAINE LIDOCAINE 2019-0      No             10mg                     Com

mon



     HCL 10MG/ML HCL 10MG/ML                                               S

[facility]



               00:00:                                              - [facility]



                                                               [facility]

 

     Hyalgan 20 Hyalgan 20 2019-0      No             20mg                     C

[facility]



     mg   mg                                                 [facility]



               00:00:                                              - [facility]



                                                               [facility]

 

     Hyalgan 20 Hyalgan 20 2019-0      No             20mg                     C

om[facility]



     mg   mg                                                 [facility]



               00:00:                                              - [facility]



                                                               [facility]

 

     LIDOCAINE LIDOCAINE 2019-0      No             10mg                     Com

mon



     HCL 10MG/ML HCL 10MG/ML                                               S

[facility]irit



               00:00:                                              - [facility]



                                                               [facility]

 

     LIDOCAINE LIDOCAINE 2019-0      No             10mg                     Com

mon



     HCL 10MG/ML HCL 10MG/ML 5                                              S

[facility]



               00:00:                                              - [facility]



                                                               [facility]

 

     Hyalgan 20 Hyalgan 20 2019-0      No             20mg                     C

[facility]



     mg   mg                                                 [facility]



               00:00:                                              - [facility]



                                                               [facility]

 

     Hyalgan 20 Hyalgan 20 2019-0      No             20mg                     C

[facility]



     mg   mg                                                 [facility]



               00:00:                                              - [facility]



                                                               [facility]

 

     LIDOCAINE LIDOCAINE 2019-0      No             10mg                     Com

mon



     HCL 10MG/ML HCL 10MG/ML 5                                              S

[facility]



               00:00:                                              - [facility]



                                                               [facility]

 

     LIDOCAINE LIDOCAINE 2019-0      No             10mg                     Com

mon



     HCL 10MG/ML HCL 10MG/ML                                               S

[facility]



               00:00:                                              - [facility]



                                                               [facility]

 

     Hyalgan 20 Hyalgan 20 2019-0      No             20mg                     C

[facility]



     mg   mg                                                 [facility]



               00:00:                                              - [facility]



                                                               [facility]

 

     Hyalgan 20 Hyalgan 20 2019-0      No             20mg                     C

[facility]



     mg   mg                                                 [facility]



               00:00:                                              - [facility]



                                                               [facility]

 

     LIDOCAINE LIDOCAINE 2019-0      No             10mg                     Com

mon



     HCL 10MG/ML HCL 10MG/ML                                               S

[facility]irit



               00:00:                                              - [facility]



                                                               [facility]

 

     LIDOCAINE LIDOCAINE 2019-0      No             10mg                     Com

mon



     HCL 10MG/ML HCL 10MG/ML                                               S

[facility]irit



               00:00:                                              - [facility]



                                                               [facility]

 

     Hyalgan 20 Hyalgan 20 2019-0      No             20mg                     C

[facility]



     mg   mg                                                 [facility]



               00:00:                                              - [facility]



                                                               [facility]

 

     Hyalgan 20 Hyalgan 20 2019-0      No             20mg                     C

[facility]



     mg   mg                                                 [facility]



               00:00:                                              - [facility]



                                                               [facility]

 

     LIDOCAINE LIDOCAINE 2019-0      No             10mg                     Com

mon



     HCL 10MG/ML HCL 10MG/ML                                               S

[facility]



               00:00:                                              - [facility]



                                                               [facility]

 

     LIDOCAINE LIDOCAINE 2019-0      No             10mg                     Com

mon



     HCL 10MG/ML HCL 10MG/ML                                               S

[facility]



               00:00:                                              - [facility]



                                                               [facility]

 

     Hyalgan 20 Hyalgan 20 2019-0      No             20mg                     C

[facility]



     mg   mg                                                 [facility]



               00:00:                                              - [facility]



                                                               [facility]

 

     Hyalgan 20 Hyalgan 20 2019-0      No             20mg                     C

[facility]



     mg   mg                                                 [facility]



               00:00:                                              - [facility]



                                                               [facility]

 

     LIDOCAINE LIDOCAINE 2019-0      No             10mg                     Com

mon



     HCL 10MG/ML HCL 10MG/ML 5                                              S

[facility]



               00:00:                                              - [facility]



                                                               [facility]

 

     LIDOCAINE LIDOCAINE 2019-0      No             10mg                     Com

mon



     HCL 10MG/ML HCL 10MG/ML 5                                              S

[facility]



               00:00:                                              - [facility]



                                                               [facility]

 

     Hyalgan 20 Hyalgan 20 2019-0      No             20mg                     C

[facility]



     mg   mg                                                 [facility]



               00:00:                                              - [facility]



                                                               [facility]

 

     Hyalgan 20 Hyalgan 20 2019-0      No             20mg                     C

[facility]



     mg   mg                                                 [facility]



               00:00:                                              - [facility]



                                                               [facility]

 

     LIDOCAINE LIDOCAINE 2019-0      No             10mg                     Com

mon



     HCL 10MG/ML HCL 10MG/ML                                               S

[facility]



               00:00:                                              - [facility]



                                                               [facility]

 

     LIDOCAINE LIDOCAINE 2019-0      No             10mg                     Com

mon



     HCL 10MG/ML HCL 10MG/ML                                               S

[facility]



               00:00:                                              - [facility]



                                                               [facility]

 

     Hyalgan 20 Hyalgan 20 2019-0      No             20mg                     C

[facility]



     mg   mg                                                 [facility]



               00:00:                                              - [facility]



                                                               [facility]

 

     Hyalgan 20 Hyalgan 20 2019-0      No             20mg                     C

[facility]



     mg   mg                                                 [facility]



               00:00:                                              - [facility]



                                                               [facility]

 

     LIDOCAINE LIDOCAINE 2019-0      No             10mg                     Com

mon



     HCL 10MG/ML HCL 10MG/ML                                               S

[facility]irit



               00:00:                                              - [facility]



                                                               [facility]

 

     LIDOCAINE LIDOCAINE 2019-0      No             10mg                     Com

mon



     HCL 10MG/ML HCL 10MG/ML                                               S

[facility]



               00:00:                                              - [facility]



                                                               [facility]

 

     Hyalgan 20 Hyalgan 20 2019-0      No             20mg                     C

[facility]



     mg   mg                                                 [facility]



               00:00:                                              - [facility]



                                                               [facility]

 

     Hyalgan 20 Hyalgan 20 2019-0      No             20mg                     C

[facility]



     mg   mg                                                 [facility]



               00:00:                                              - [facility]



                                                               [facility]

 

     LIDOCAINE LIDOCAINE 2019-0      No             10mg                     Com

mon



     HCL 10MG/ML HCL 10MG/ML                                               S

[facility]



               00:00:                                              - [facility]



                                                               [facility]

 

     Hyalgan 20 Hyalgan 20 2019-0      No             20mg                     C

[facility]



     mg   mg                                                 [facility]



               00:00:                                              - [facility]



                                                               [facility]

 

     Hyalgan 20 Hyalgan 20 2019-0      No             20mg                     C

[facility]



     mg   mg                                                 [facility]



               00:00:                                              - [facility]



                                                               [facility]

 

     LIDOCAINE LIDOCAINE 2019-0      No             10mg                     Com

mon



     HCL 10MG/ML HCL 10MG/ML 4-25                                              S

[facility]



               00:00:                                              - [facility]



                                                               [facility]

 

     LIDOCAINE LIDOCAINE 2019-0      No             10mg                     Com

mon



     HCL 10MG/ML HCL 10MG/ML 4-25                                              S

[facility]



               00:00:                                              - [facility]



                                                               [facility]

 

     Hyalgan 20 Hyalgan 20 2019-0      No             20mg                     C

[facility]



     mg   mg   4-25                                              [facility]



               00:00:                                              - [facility]



                                                               [facility]

 

     Hyalgan 20 Hyalgan 20 2019-0      No             20mg                     C

[facility]



     mg   mg   4-25                                              [facility]



               00:00:                                              - [facility]



                                                               [facility]

 

     LIDOCAINE LIDOCAINE 2019-0      No             10mg                     Com

mon



     HCL 10MG/ML HCL 10MG/ML 4-25                                              S

[facility]



               00:00:                                              - [facility]



                                                               [facility]

 

     LIDOCAINE LIDOCAINE 2019-0      No             10mg                     Com

mon



     HCL 10MG/ML HCL 10MG/ML 4-25                                              S

[facility]



               00:00:                                              - [facility]



                                                               [facility]

 

     Hyalgan 20 Hyalgan 20 2019-0      No             20mg                     C

[facility]



     mg   mg   4-25                                              [facility]



               00:00:                                              - [facility]



                                                               [facility]

 

     Hyalgan 20 Hyalgan 20 2019-0      No             20mg                     C

[facility]



     mg   mg   4-25                                              [facility]



               00:00:                                              - [facility]



                                                               [facility]

 

     LIDOCAINE LIDOCAINE 2019-0      No             10mg                     Com

mon



     HCL 10MG/ML HCL 10MG/ML 4-25                                              S

[facility]



               00:00:                                              - [facility]



                                                               [facility]

 

     LIDOCAINE LIDOCAINE 2019-0      No             10mg                     Com

mon



     HCL 10MG/ML HCL 10MG/ML 4-25                                              S

[facility]



               00:00:                                              - [facility]



                                                               [facility]

 

     Hyalgan 20 Hyalgan 20 2019-0      No             20mg                     C

[facility]



     mg   mg   4-25                                              [facility]



               00:00:                                              - [facility]



                                                               [facility]

 

     Hyalgan 20 Hyalgan 20 2019-0      No             20mg                     C

[facility]



     mg   mg   4-25                                              [facility]



               00:00:                                              - [facility]



                                                               [facility]

 

     LIDOCAINE LIDOCAINE 2019-0      No             10mg                     Com

mon



     HCL 10MG/ML HCL 10MG/ML 4-25                                              S

[facility]



               00:00:                                              - [facility]



                                                               [facility]

 

     LIDOCAINE LIDOCAINE 2019-0      No             10mg                     Com

mon



     HCL 10MG/ML HCL 10MG/ML 4-25                                              S

[facility]



               00:00:                                              - [facility]



                                                               [facility]

 

     Hyalgan 20 Hyalgan 20 2019-0      No             20mg                     C

[facility]



     mg   mg   4-25                                              [facility]



               00:00:                                              - [facility]



                                                               [facility]

 

     Hyalgan 20 Hyalgan 20 2019-0      No             20mg                     C

[facility]



     mg   mg   4-25                                              [facility]



               00:00:                                              - [facility]



                                                               [facility]

 

     LIDOCAINE LIDOCAINE 2019-0      No             10mg                     Com

mon



     HCL 10MG/ML HCL 10MG/ML 4-25                                              S

[facility]



               00:00:                                              - [facility]



                                                               [facility]

 

     LIDOCAINE LIDOCAINE 2019-0      No             10mg                     Com

mon



     HCL 10MG/ML HCL 10MG/ML 4-25                                              S

[facility]



               00:00:                                              - [facility]



                                                               [facility]

 

     Hyalgan 20 Hyalgan 20 2019-0      No             20mg                     C

[facility]



     mg   mg   4-25                                              [facility]



               00:00:                                              - [facility]



                                                               [facility]

 

     Hyalgan 20 Hyalgan 20 2019-0      No             20mg                     C

[facility]



     mg   mg   4-25                                              [facility]



               00:00:                                              - [facility]



                                                               [facility]

 

     LIDOCAINE LIDOCAINE 2019-0      No             10mg                     Com

mon



     HCL 10MG/ML HCL 10MG/ML 4-25                                              S

[facility]



               00:00:                                              - [facility]



                                                               [facility]

 

     LIDOCAINE LIDOCAINE 2019-0      No             10mg                     Com

mon



     HCL 10MG/ML HCL 10MG/ML 4-25                                              S

[facility]



               00:00:                                              - [facility]



                                                               [facility]

 

     Hyalgan 20 Hyalgan 20 2019-0      No             20mg                     C

[facility]



     mg   mg   4-25                                              [facility]



               00:00:                                              - [facility]



                                                               [facility]

 

     Hyalgan 20 Hyalgan 20 2019-0      No             20mg                     C

[facility]



     mg   mg   4-25                                              [facility]



               00:00:                                              - [facility]



                                                               [facility]

 

     LIDOCAINE LIDOCAINE 2019-0      No             10mg                     Com

mon



     HCL 10MG/ML HCL 10MG/ML 4-25                                              S

[facility]



               00:00:                                              - [facility]



                                                               [facility]

 

     LIDOCAINE LIDOCAINE 2019-0      No             10mg                     Com

mon



     HCL 10MG/ML HCL 10MG/ML 4-25                                              S

[facility]



               00:00:                                              - [facility]



                                                               [facility]

 

     Hyalgan 20 Hyalgan 20 2019-0      No             20mg                     C

[facility]



     mg   mg   4-25                                              [facility]



               00:00:                                              - [facility]



                                                               [facility]

 

     Hyalgan 20 Hyalgan 20 2019-0      No             20mg                     C

[facility]



     mg   mg   4-25                                              [facility]



               00:00:                                              - [facility]



                                                               [facility]

 

     LIDOCAINE LIDOCAINE 2019-0      No             10mg                     Com

mon



     HCL 10MG/ML HCL 10MG/ML 4-25                                              S

[facility]



               00:00:                                              - [facility]



               00                                                [facility]

 

     LIDOCAINE LIDOCAINE 2019-0      No             10mg                     Com

mon



     HCL 10MG/ML HCL 10MG/ML 4-25                                              S

[facility]



               00:00:                                              - [facility]



                                                               [facility]

 

     Hyalgan 20 Hyalgan 20 2019-0      No             20mg                     C

[facility]



     mg   mg   4-25                                              [facility]



               00:00:                                              - [facility]



                                                               [facility]

 

     Hyalgan 20 Hyalgan 20 2019-0      No             20mg                     C

[facility]



     mg   mg   4-25                                              [facility]



               00:00:                                              - [facility]



                                                               [facility]

 

     LIDOCAINE LIDOCAINE 2019-0      No             10mg                     Com

mon



     HCL 10MG/ML HCL 10MG/ML 4-25                                              S

[facility]



               00:00:                                              - [facility]



                                                               [facility]

 

     LIDOCAINE LIDOCAINE 2019-0      No             10mg                     Com

mon



     HCL 10MG/ML HCL 10MG/ML 4-25                                              S

[facility]



               00:00:                                              - [facility]



                                                               [facility]

 

     Hyalgan 20 Hyalgan 20 2019-0      No             20mg                     C

ommon



     mg   mg   4-25                                              [facility]



               00:00:                                              - [facility]



                                                               [facility]

 

     Hyalgan 20 Hyalgan 20 2019-0      No             20mg                     C

ommon



     mg   mg   4-25                                              [facility]



               00:00:                                              - [facility]



                                                               [facility]

 

     LIDOCAINE LIDOCAINE 2019-0      No             10mg                     Com

mon



     HCL 10MG/ML HCL 10MG/ML 4-25                                              S

pirit



               00:00:                                              - [facility]



                                                               [facility]

 

     LIDOCAINE LIDOCAINE 2019-0      No             10mg                     Com

mon



     HCL 10MG/ML HCL 10MG/ML 4-25                                              S

pirit



               00:00:                                              - [facility]



                                                               [facility]

 

     Hyalgan 20 Hyalgan 20 2019-0      No             20mg                     C

ommon



     mg   mg   4-25                                              [facility]



               00:00:                                              - [facility]



                                                               [facility]

 

     Hyalgan 20 Hyalgan 20 2019-0      No             20mg                     C

ommon



     mg   mg   4-25                                              [facility]



               00:00:                                              - [facility]



                                                               [facility]

 

     LIDOCAINE LIDOCAINE 2019-0      No             10mg                     Com

mon



     HCL 10MG/ML HCL 10MG/ML 4-25                                              S

pirit



               00:00:                                              - [facility]



                                                               [facility]

 

     LIDOCAINE LIDOCAINE 2019-0      No             10mg                     Com

mon



     HCL 10MG/ML HCL 10MG/ML 4-25                                              S

pirit



               00:00:                                              - [facility]



                                                               [facility]

 

     Hyalgan 20 Hyalgan 20 2019-0      No             20mg                     C

ommon



     mg   mg   4-25                                              [facility]



               00:00:                                              - [facility]



                                                               [facility]

 

     Hyalgan 20 Hyalgan 20 2019-0      No             20mg                     C

ommon



     mg   mg   4-25                                              [facility]



               00:00:                                              - [facility]



                                                               [facility]

 

     LIDOCAINE LIDOCAINE 2019-0      No             10mg                     Com

mon



     HCL 10MG/ML HCL 10MG/ML 4-25                                              S

pirit



               00:00:                                              - [facility]



                                                               [facility]

 

     LIDOCAINE LIDOCAINE 2019-0      No             10mg                     Com

mon



     HCL 10MG/ML HCL 10MG/ML 4-25                                              S

pirit



               00:00:                                              - [facility]



                                                               [facility]

 

     Hyalgan 20 Hyalgan 20 2019-0      No             20mg                     C

ommon



     mg   mg   4-25                                              [facility]



               00:00:                                              - [facility]



                                                               [facility]

 

     Hyalgan 20 Hyalgan 20 2019-0      No             20mg                     C

ommon



     mg   mg   4-25                                              [facility]



               00:00:                                              - [facility]



                                                               [facility]

 

     LIDOCAINE LIDOCAINE 2019-0      No             10mg                     Com

mon



     HCL 10MG/ML HCL 10MG/ML 4-25                                              S

pirit



               00:00:                                              - [facility]



                                                               [facility]

 

     LIDOCAINE LIDOCAINE 2019-0      No             10mg                     Com

mon



     HCL 10MG/ML HCL 10MG/ML 4-25                                              S

pirit



               00:00:                                              - [facility]



                                                               [facility]

 

     Hyalgan 20 Hyalgan 20 2019-0      No             20mg                     C

ommon



     mg   mg   4-25                                              [facility]



               00:00:                                              - [facility]



                                                               [facility]

 

     Hyalgan 20 Hyalgan 20 2019-0      No             20mg                     C

ommon



     mg   mg   4-25                                              [facility]



               00:00:                                              - [facility]



                                                               [facility]

 

     LIDOCAINE LIDOCAINE 2019-0      No             10mg                     Com

mon



     HCL 10MG/ML HCL 10MG/ML 4-25                                              S

pirit



               00:00:                                              - [facility]



                                                               [facility]

 

     LIDOCAINE LIDOCAINE 2019-0      No             10mg                     Com

mon



     HCL 10MG/ML HCL 10MG/ML 4-25                                              S

pirit



               00:00:                                              - [facility]



                                                               [facility]

 

     Hyalgan 20 Hyalgan 20 2019-0      No             20mg                     C

ommon



     mg   mg   4-25                                              [facility]



               00:00:                                              - [facility]



                                                               [facility]

 

     Hyalgan 20 Hyalgan 20 2019-0      No             20mg                     C

ommon



     mg   mg   4-25                                              [facility]



               00:00:                                              - [facility]



                                                               [facility]

 

     LIDOCAINE LIDOCAINE 2019-0      No             10mg                     Com

mon



     HCL 10MG/ML HCL 10MG/ML 4-25                                              S

pirit



               00:00:                                              - [facility]



                                                               [facility]

 

     LIDOCAINE LIDOCAINE 2019-0      No             10mg                     Com

mon



     HCL 10MG/ML HCL 10MG/ML 4-25                                              S

pirit



               00:00:                                              - [facility]



                                                               [facility]

 

     Hyalgan 20 Hyalgan 20 2019-0      No             20mg                     C

ommon



     mg   mg   4-25                                              [facility]



               00:00:                                              - [facility]



                                                               [facility]

 

     Hyalgan 20 Hyalgan 20 2019-0      No             20mg                     C

ommon



     mg   mg   4-25                                              [facility]



               00:00:                                              - [facility]



                                                               [facility]

 

     LIDOCAINE LIDOCAINE 2019-0      No             10mg                     Com

mon



     HCL 10MG/ML HCL 10MG/ML 4-25                                              S

pirit



               00:00:                                              - [facility]



                                                               [facility]

 

     LIDOCAINE LIDOCAINE 2019-0      No             10mg                     Com

mon



     HCL 10MG/ML HCL 10MG/ML 4-25                                              S

pirit



               00:00:                                              - [facility]



                                                               [facility]

 

     Hyalgan 20 Hyalgan 20 2019-0      No             20mg                     C

ommon



     mg   mg   4-25                                              [facility]



               00:00:                                              - [facility]



                                                               [facility]

 

     Hyalgan 20 Hyalgan 20 2019-0      No             20mg                     C

ommon



     mg   mg   4-25                                              [facility]



               00:00:                                              - [facility]



                                                               [facility]

 

     LIDOCAINE LIDOCAINE 2019-0      No             10mg                     Com

mon



     HCL 10MG/ML HCL 10MG/ML 4-25                                              S

pirit



               00:00:                                              - [facility]



                                                               [facility]

 

     LIDOCAINE LIDOCAINE 2019-0      No             10mg                     Com

mon



     HCL 10MG/ML HCL 10MG/ML 4-25                                              S

pirit



               00:00:                                              - [facility]



                                                               [facility]

 

     Hyalgan 20 Hyalgan 20 2019-0      No             20mg                     C

ommon



     mg   mg   4-25                                              [facility]



               00:00:                                              - [facility]



                                                               [facility]

 

     Hyalgan 20 Hyalgan 20 2019-0      No             20mg                     C

ommon



     mg   mg   4-25                                              [facility]



               00:00:                                              - [facility]



                                                               [facility]

 

     LIDOCAINE LIDOCAINE 2019-0      No             10mg                     Com

mon



     HCL 10MG/ML HCL 10MG/ML 4-25                                              S

pirit



               00:00:                                              - [facility]



                                                               [facility]

 

     LIDOCAINE LIDOCAINE 2019-0      No             10mg                     Com

mon



     HCL 10MG/ML HCL 10MG/ML 4-25                                              S

pirit



               00:00:                                              - [facility]



                                                               [facility]

 

     Hyalgan 20 Hyalgan 20 2019-0      No             20mg                     C

ommon



     mg   mg   4-25                                              [facility]



               00:00:                                              - [facility]



                                                               [facility]

 

     Hyalgan 20 Hyalgan 20 2019-0      No             20mg                     C

ommon



     mg   mg   4-25                                              [facility]



               00:00:                                              - [facility]



                                                               [facility]

 

     LIDOCAINE LIDOCAINE 2019-0      No             10mg                     Com

mon



     HCL 10MG/ML HCL 10MG/ML 4-25                                              S

pirit



               00:00:                                              - [facility]



                                                               [facility]

 

     LIDOCAINE LIDOCAINE 2019-0      No             10mg                     Com

mon



     HCL 10MG/ML HCL 10MG/ML 4-25                                              S

[facility]irit



               00:00:                                              - [facility]



                                                               [facility]

 

     Hyalgan 20 Hyalgan 20 2019-0      No             20mg                     C

ommon



     mg   mg   4-25                                              [facility]



               00:00:                                              - [facility]



                                                               [facility]

 

     Hyalgan 20 Hyalgan 20 2019-0      No             20mg                     C

ommon



     mg   mg   4-25                                              [facility]



               00:00:                                              - [facility]



                                                               [facility]

 

     LIDOCAINE LIDOCAINE 2019-0      No             10mg                     Com

mon



     HCL 10MG/ML HCL 10MG/ML 4-25                                              S

pirit



               00:00:                                              - [facility]



                                                               [facility]

 

     LIDOCAINE LIDOCAINE 2019-0      No             10mg                     Com

mon



     HCL 10MG/ML HCL 10MG/ML 4-25                                              S

pirit



               00:00:                                              - [facility]



                                                               [facility]

 

     Hyalgan 20 Hyalgan 20 2019-0      No             20mg                     C

ommon



     mg   mg   4-25                                              [facility]



               00:00:                                              - [facility]



                                                               [facility]

 

     Hyalgan 20 Hyalgan 20 2019-0      No             20mg                     C

ommon



     mg   mg   4-25                                              [facility]



               00:00:                                              - [facility]



                                                               [facility]

 

     LIDOCAINE LIDOCAINE 2019-0      No             10mg                     Com

mon



     HCL 10MG/ML HCL 10MG/ML 4-25                                              S

pirit



               00:00:                                              - [facility]



                                                               [facility]

 

     LIDOCAINE LIDOCAINE 2019-0      No             10mg                     Com

mon



     HCL 10MG/ML HCL 10MG/ML 4-25                                              S

pirit



               00:00:                                              - [facility]



                                                               [facility]

 

     Hyalgan 20 Hyalgan 20 2019-0      No             20mg                     C

ommon



     mg   mg   4-25                                              [facility]



               00:00:                                              - [facility]



                                                               [facility]

 

     Hyalgan 20 Hyalgan 20 2019-0      No             20mg                     C

ommon



     mg   mg   4-25                                              [facility]



               00:00:                                              - [facility]



                                                               [facility]

 

     LIDOCAINE LIDOCAINE 2019-0      No             10mg                     Com

mon



     HCL 10MG/ML HCL 10MG/ML 4-25                                              S

pirit



               00:00:                                              - [facility]



                                                               [facility]

 

     LIDOCAINE LIDOCAINE 2019-0      No             10mg                     Com

mon



     HCL 10MG/ML HCL 10MG/ML 4-25                                              S

pirit



               00:00:                                              - [facility]



                                                               [facility]

 

     Hyalgan 20 Hyalgan 20 2019-0      No             20mg                     C

ommon



     mg   mg   4-25                                              [facility]



               00:00:                                              - [facility]



                                                               [facility]

 

     Hyalgan 20 Hyalgan 20 2019-0      No             20mg                     C

ommon



     mg   mg   4-25                                              [facility]



               00:00:                                              - [facility]



                                                               [facility]

 

     LIDOCAINE LIDOCAINE 2019-0      No             10mg                     Com

mon



     HCL 10MG/ML HCL 10MG/ML 4-25                                              S

pirit



               00:00:                                              - [facility]



                                                               [facility]

 

     LIDOCAINE LIDOCAINE 2019-0      No             10mg                     Com

mon



     HCL 10MG/ML HCL 10MG/ML 4-25                                              S

[facility]irit



               00:00:                                              - [facility]



                                                               [facility]

 

     Hyalgan 20 Hyalgan 20 2019-0      No             20mg                     C

ommon



     mg   mg   4-25                                              [facility]



               00:00:                                              - [facility]



                                                               [facility]

 

     Hyalgan 20 Hyalgan 20 2019-0      No             20mg                     C

ommon



     mg   mg   4-25                                              [facility]



               00:00:                                              - [facility]



                                                               [facility]

 

     LIDOCAINE LIDOCAINE 2019-0      No             10mg                     Com

mon



     HCL 10MG/ML HCL 10MG/ML 4-25                                              S

pirit



               00:00:                                              - [facility]



                                                               [facility]

 

     LIDOCAINE LIDOCAINE 2019-0      No             10mg                     Com

mon



     HCL 10MG/ML HCL 10MG/ML 4-25                                              S

pirit



               00:00:                                              - [facility]



                                                               [facility]

 

     Hyalgan 20 Hyalgan 20 2019-0      No             20mg                     C

ommon



     mg   mg   4-25                                              [facility]



               00:00:                                              - [facility]



                                                               [facility]

 

     Hyalgan 20 Hyalgan 20 2019-0      No             20mg                     C

ommon



     mg   mg   4-25                                              [facility]



               00:00:                                              - [facility]



                                                               [facility]

 

     LIDOCAINE LIDOCAINE 2019-0      No             10mg                     Com

mon



     HCL 10MG/ML HCL 10MG/ML 4-25                                              S

pirit



               00:00:                                              - [facility]



                                                               [facility]

 

     LIDOCAINE LIDOCAINE 2019-0      No             10mg                     Com

mon



     HCL 10MG/ML HCL 10MG/ML 4-25                                              S

pirit



               00:00:                                              - [facility]



                                                               [facility]

 

     Hyalgan 20 Hyalgan 20 2019-0      No             20mg                     C

ommon



     mg   mg   4-25                                              [facility]



               00:00:                                              - [facility]



                                                               [facility]

 

     Hyalgan 20 Hyalgan 20 2019-0      No             20mg                     C

ommon



     mg   mg   4-25                                              [facility]



               00:00:                                              - [facility]



                                                               [facility]

 

     LIDOCAINE LIDOCAINE 2019-0      No             10mg                     Com

mon



     HCL 10MG/ML HCL 10MG/ML 4-25                                              S

pirit



               00:00:                                              - [facility]



                                                               [facility]

 

     LIDOCAINE LIDOCAINE 2019-0      No             10mg                     Com

mon



     HCL 10MG/ML HCL 10MG/ML 4-25                                              S

pirit



               00:00:                                              - [facility]



                                                               [facility]

 

     Hyalgan 20 Hyalgan 20 2019-0      No             20mg                     C

ommon



     mg   mg   4-25                                              [facility]



               00:00:                                              - [facility]



                                                               [facility]

 

     Hyalgan 20 Hyalgan 20 2019-0      No             20mg                     C

ommon



     mg   mg   4-25                                              [facility]



               00:00:                                              - [facility]



                                                               [facility]

 

     LIDOCAINE LIDOCAINE 2019-0      No             10mg                     Com

mon



     HCL 10MG/ML HCL 10MG/ML 4-25                                              S

pirit



               00:00:                                              - [facility]



                                                               [facility]

 

     LIDOCAINE LIDOCAINE 2019-0      No             10mg                     Com

mon



     HCL 10MG/ML HCL 10MG/ML 4-25                                              S

pirit



               00:00:                                              - [facility]



                                                               [facility]

 

     Hyalgan 20 Hyalgan 20 2019-0      No             20mg                     C

ommon



     mg   mg   4-25                                              [facility]



               00:00:                                              - [facility]



                                                               [facility]

 

     Hyalgan 20 Hyalgan 20 2019-0      No             20mg                     C

ommon



     mg   mg   4-25                                              [facility]



               00:00:                                              - [facility]



                                                               [facility]

 

     LIDOCAINE LIDOCAINE 2019-0      No             10mg                     Com

mon



     HCL 10MG/ML HCL 10MG/ML 4-25                                              S

pirit



               00:00:                                              - [facility]



                                                               [facility]

 

     LIDOCAINE LIDOCAINE 2019-0      No             10mg                     Com

mon



     HCL 10MG/ML HCL 10MG/ML 4-25                                              S

[facility]irit



               00:00:                                              - [facility]



                                                               [facility]

 

     Hyalgan 20 Hyalgan 20 2019-0      No             20mg                     C

ommon



     mg   mg   4-25                                              [facility]



               00:00:                                              - [facility]



                                                               [facility]

 

     Hyalgan 20 Hyalgan 20 2019-0      No             20mg                     C

ommon



     mg   mg   4-25                                              [facility]



               00:00:                                              - [facility]



                                                               [facility]

 

     LIDOCAINE LIDOCAINE 2019-0      No             10mg                     Com

mon



     HCL 10MG/ML HCL 10MG/ML 4-25                                              S

pirit



               00:00:                                              - [facility]



                                                               [facility]

 

     LIDOCAINE LIDOCAINE 2019-0      No             10mg                     Com

mon



     HCL 10MG/ML HCL 10MG/ML 4-25                                              S

[facility]irit



               00:00:                                              - [facility]



                                                               [facility]

 

     Hyalgan 20 Hyalgan 20 2019-0      No             20mg                     C

ommon



     mg   mg   4-25                                              [facility]



               00:00:                                              - [facility]



                                                               [facility]

 

     Hyalgan 20 Hyalgan 20 2019-0      No             20mg                     C

ommon



     mg   mg   4-25                                              [facility]



               00:00:                                              - [facility]



                                                               [facility]

 

     LIDOCAINE LIDOCAINE 2019-0      No             10mg                     Com

mon



     HCL 10MG/ML HCL 10MG/ML 4-25                                              S

[facility]irit



               00:00:                                              - [facility]



                                                               [facility]

 

     LIDOCAINE LIDOCAINE 2019-0      No             10mg                     Com

mon



     HCL 10MG/ML HCL 10MG/ML 4-25                                              S

[facility]irit



               00:00:                                              - [facility]



                                                               [facility]

 

     Hyalgan 20 Hyalgan 20 2019-0      No             20mg                     C

ommon



     mg   mg   425                                              [facility]



               00:00:                                              - [facility]



                                                               [facility]

 

     Hyalgan 20 Hyalgan 20 2019-0      No             20mg                     C

ommon



     mg   mg   425                                              [facility]



               00:00:                                              - [facility]



                                                               [facility]

 

     LIDOCAINE LIDOCAINE 2019-0      No             10mg                     Com

mon



     HCL 10MG/ML HCL 10MG/ML 4-25                                              S

[facility]



               00:00:                                              - [facility]



                                                               [facility]

 

     LIDOCAINE LIDOCAINE 2019-0      No             10mg                     Com

mon



     HCL 10MG/ML HCL 10MG/ML 4-25                                              S

[facility]



               00:00:                                              - [facility]



                                                               [facility]

 

     Hyalgan 20 Hyalgan 20 2019-0      No             20mg                     C

ommon



     mg   mg   4-                                              [facility]



               00:00:                                              - [facility]



                                                               [facility]

 

     Hyalgan 20 Hyalgan 20 2019-0      No             20mg                     C

ommon



     mg   mg   4-17                                              [facility]



               00:00:                                              - [facility]



                                                               [facility]

 

     LIDOCAINE LIDOCAINE 2019-0      No             10ug                     Com

mon



     HCL 10MG/ML HCL 10MG/ML 4-17                                              S

[facility]



               00:00:                                              - [facility]



                                                               [facility]

 

     LIDOCAINE LIDOCAINE 2019-0      No             10mg                     Com

mon



     HCL 10MG/ML HCL 10MG/ML 4-17                                              S

[facility]



               00:00:                                              - [facility]



                                                               [facility]

 

     Hyalgan 20 Hyalgan 20 2019-0      No             20mg                     C

ommon



     mg   mg   4-17                                              [facility]



               00:00:                                              - [facility]



                                                               [facility]

 

     Hyalgan 20 Hyalgan 20 2019-0      No             20mg                     C

ommon



     mg   mg   4-17                                              [facility]



               00:00:                                              - [facility]



                                                               [facility]

 

     LIDOCAINE LIDOCAINE 2019-0      No             10ug                     Com

mon



     HCL 10MG/ML HCL 10MG/ML 4-17                                              S

[facility]



               00:00:                                              - [facility]



                                                               [facility]

 

     LIDOCAINE LIDOCAINE 2019-0      No             10mg                     Com

mon



     HCL 10MG/ML HCL 10MG/ML 4-17                                              S

[facility]



               00:00:                                              - [facility]



                                                               [facility]

 

     Hyalgan 20 Hyalgan 20 2019-0      No             20mg                     C

ommon



     mg   mg   4-17                                              [facility]



               00:00:                                              - [facility]



                                                               [facility]

 

     Hyalgan 20 Hyalgan 20 2019-0      No             20mg                     C

ommon



     mg   mg   4-17                                              [facility]



               00:00:                                              - [facility]



                                                               [facility]

 

     LIDOCAINE LIDOCAINE 2019-0      No             10ug                     Com

mon



     HCL 10MG/ML HCL 10MG/ML 4-17                                              S

[facility]



               00:00:                                              - [facility]



                                                               [facility]

 

     LIDOCAINE LIDOCAINE 2019-0      No             10mg                     Com

mon



     HCL 10MG/ML HCL 10MG/ML 4-17                                              S

[facility]



               00:00:                                              - [facility]



                                                               [facility]

 

     Hyalgan 20 Hyalgan 20 2019-0      No             20mg                     C

ommon



     mg   mg   4-17                                              [facility]



               00:00:                                              - [facility]



                                                               [facility]

 

     Hyalgan 20 Hyalgan 20 2019-0      No             20mg                     C

ommon



     mg   mg   4-17                                              [facility]



               00:00:                                              - [facility]



                                                               [facility]

 

     LIDOCAINE LIDOCAINE 2019-0      No             10ug                     Com

mon



     HCL 10MG/ML HCL 10MG/ML 4-17                                              S

[facility]



               00:00:                                              - [facility]



                                                               [facility]

 

     LIDOCAINE LIDOCAINE 2019-0      No             10mg                     Com

mon



     HCL 10MG/ML HCL 10MG/ML 4-17                                              S

[facility]



               00:00:                                              - [facility]



                                                               [facility]

 

     Hyalgan 20 Hyalgan 20 2019-0      No             20mg                     C

ommon



     mg   mg   4-17                                              [facility]



               00:00:                                              - [facility]



                                                               [facility]

 

     Hyalgan 20 Hyalgan 20 2019-0      No             20mg                     C

ommon



     mg   mg   4-17                                              [facility]



               00:00:                                              - [facility]



                                                               [facility]

 

     LIDOCAINE LIDOCAINE 2019-0      No             10ug                     Com

mon



     HCL 10MG/ML HCL 10MG/ML 4-17                                              S

[facility]



               00:00:                                              - [facility]



                                                               [facility]

 

     LIDOCAINE LIDOCAINE 2019-0      No             10mg                     Com

mon



     HCL 10MG/ML HCL 10MG/ML 4-17                                              S

[facility]



               00:00:                                              - [facility]



                                                               [facility]

 

     Hyalgan 20 Hyalgan 20 2019-0      No             20mg                     C

ommon



     mg   mg   4-17                                              [facility]



               00:00:                                              - [facility]



                                                               [facility]

 

     Hyalgan 20 Hyalgan 20 2019-0      No             20mg                     C

ommon



     mg   mg   4-17                                              [facility]



               00:00:                                              - [facility]



                                                               [facility]

 

     LIDOCAINE LIDOCAINE 2019-0      No             10ug                     Com

mon



     HCL 10MG/ML HCL 10MG/ML 4-17                                              S

[facility]



               00:00:                                              - [facility]



                                                               [facility]

 

     LIDOCAINE LIDOCAINE 2019-0      No             10mg                     Com

mon



     HCL 10MG/ML HCL 10MG/ML 4-17                                              S

[facility]



               00:00:                                              - [facility]



                                                               [facility]

 

     Hyalgan 20 Hyalgan 20 2019-0      No             20mg                     C

ommon



     mg   mg                                                 [facility]



               00:00:                                              - [facility]



                                                               [facility]

 

     Hyalgan 20 Hyalgan 20 2019-0      No             20mg                     C

ommon



     mg   mg   4-                                              [facility]



               00:00:                                              - [facility]



                                                               [facility]

 

     LIDOCAINE LIDOCAINE 2019-0      No             10ug                     Com

mon



     HCL 10MG/ML HCL 10MG/ML 4-17                                              S

[facility]



               00:00:                                              - [facility]



                                                               [facility]

 

     LIDOCAINE LIDOCAINE 2019-0      No             10mg                     Com

mon



     HCL 10MG/ML HCL 10MG/ML 4-17                                              S

[facility]



               00:00:                                              - [facility]



                                                               [facility]

 

     Hyalgan 20 Hyalgan 20 2019-0      No             20mg                     C

ommon



     mg   mg   4-                                              [facility]



               00:00:                                              - [facility]



                                                               [facility]

 

     Hyalgan 20 Hyalgan 20 2019-0      No             20mg                     C

ommon



     mg   mg   4-17                                              [facility]



               00:00:                                              - [facility]



                                                               [facility]

 

     LIDOCAINE LIDOCAINE 2019-0      No             10ug                     Com

mon



     HCL 10MG/ML HCL 10MG/ML 4-17                                              S

[facility]



               00:00:                                              - [facility]



                                                               [facility]

 

     LIDOCAINE LIDOCAINE 2019-0      No             10mg                     Com

mon



     HCL 10MG/ML HCL 10MG/ML 4-17                                              S

[facility]



               00:00:                                              - [facility]



                                                               [facility]

 

     Hyalgan 20 Hyalgan 20 2019-0      No             20mg                     C

ommon



     mg   mg   4-17                                              [facility]



               00:00:                                              - [facility]



                                                               [facility]

 

     Hyalgan 20 Hyalgan 20 2019-0      No             20mg                     C

ommon



     mg   mg   4-17                                              [facility]



               00:00:                                              - [facility]



                                                               [facility]

 

     LIDOCAINE LIDOCAINE 2019-0      No             10ug                     Com

mon



     HCL 10MG/ML HCL 10MG/ML 4-17                                              S

[facility]



               00:00:                                              - [facility]



                                                               [facility]

 

     LIDOCAINE LIDOCAINE 2019-0      No             10mg                     Com

mon



     HCL 10MG/ML HCL 10MG/ML 4-17                                              S

[facility]



               00:00:                                              - [facility]



                                                               [facility]

 

     Hyalgan 20 Hyalgan 20 2019-0      No             20mg                     C

ommon



     mg   mg   4-17                                              [facility]



               00:00:                                              - [facility]



                                                               [facility]

 

     Hyalgan 20 Hyalgan 20 2019-0      No             20mg                     C

ommon



     mg   mg   417                                              [facility]



               00:00:                                              - [facility]



                                                               [facility]

 

     LIDOCAINE LIDOCAINE 2019-0      No             10ug                     Com

mon



     HCL 10MG/ML HCL 10MG/ML 4-17                                              S

[facility]



               00:00:                                              - [facility]



                                                               [facility]

 

     LIDOCAINE LIDOCAINE 2019-0      No             10mg                     Com

mon



     HCL 10MG/ML HCL 10MG/ML 4-17                                              S

[facility]



               00:00:                                              - [facility]



                                                               [facility]

 

     Hyalgan 20 Hyalgan 20 2019-0      No             20mg                     C

ommon



     mg   mg   4-17                                              [facility]



               00:00:                                              - [facility]



                                                               [facility]

 

     Hyalgan 20 Hyalgan 20 2019-0      No             20mg                     C

ommon



     mg   mg   4-17                                              [facility]



               00:00:                                              - [facility]



                                                               [facility]

 

     LIDOCAINE LIDOCAINE 2019-0      No             10ug                     Com

mon



     HCL 10MG/ML HCL 10MG/ML 4-17                                              S

[facility]



               00:00:                                              - [facility]



                                                               [facility]

 

     LIDOCAINE LIDOCAINE 2019-0      No             10mg                     Com

mon



     HCL 10MG/ML HCL 10MG/ML 4-17                                              S

[facility]



               00:00:                                              - [facility]



                                                               [facility]

 

     Hyalgan 20 Hyalgan 20 2019-0      No             20mg                     C

ommon



     mg   mg   4-17                                              [facility]



               00:00:                                              - [facility]



                                                               [facility]

 

     Hyalgan 20 Hyalgan 20 2019-0      No             20mg                     C

ommon



     mg   mg   4-17                                              [facility]



               00:00:                                              - [facility]



                                                               [facility]

 

     LIDOCAINE LIDOCAINE 2019-0      No             10ug                     Com

mon



     HCL 10MG/ML HCL 10MG/ML 4-17                                              S

[facility]



               00:00:                                              - [facility]



                                                               [facility]

 

     LIDOCAINE LIDOCAINE 2019-0      No             10mg                     Com

mon



     HCL 10MG/ML HCL 10MG/ML 4-17                                              S

[facility]



               00:00:                                              - [facility]



                                                               [facility]

 

     Hyalgan 20 Hyalgan 20 2019-0      No             20mg                     C

ommon



     mg   mg   4-                                              [facility]



               00:00:                                              - [facility]



                                                               [facility]

 

     Hyalgan 20 Hyalgan 20 2019-0      No             20mg                     C

ommon



     mg   mg   4-                                              [facility]



               00:00:                                              - [facility]



                                                               [facility]

 

     LIDOCAINE LIDOCAINE 2019-0      No             10ug                     Com

mon



     HCL 10MG/ML HCL 10MG/ML 4-17                                              S

[facility]



               00:00:                                              - [facility]



                                                               [facility]

 

     LIDOCAINE LIDOCAINE 2019-0      No             10mg                     Com

mon



     HCL 10MG/ML HCL 10MG/ML 4-17                                              S

[facility]



               00:00:                                              - [facility]



                                                               [facility]

 

     Hyalgan 20 Hyalgan 20 2019-0      No             20mg                     C

ommon



     mg   mg   -                                              [facility]



               00:00:                                              - [facility]



                                                               [facility]

 

     Hyalgan 20 Hyalgan 20 2019-0      No             20mg                     C

ommon



     mg   mg   4-                                              [facility]



               00:00:                                              - [facility]



                                                               [facility]

 

     LIDOCAINE LIDOCAINE 2019-0      No             10ug                     Com

mon



     HCL 10MG/ML HCL 10MG/ML 4-17                                              S

[facility]



               00:00:                                              - [facility]



                                                               [facility]

 

     LIDOCAINE LIDOCAINE 2019-0      No             10mg                     Com

mon



     HCL 10MG/ML HCL 10MG/ML 4-17                                              S

[facility]



               00:00:                                              - [facility]



                                                               [facility]

 

     Hyalgan 20 Hyalgan 20 2019-0      No             20mg                     C

ommon



     mg   mg   -                                              [facility]



               00:00:                                              - [facility]



                                                               [facility]

 

     Hyalgan 20 Hyalgan 20 2019-0      No             20mg                     C

ommon



     mg   mg   4                                              [facility]



               00:00:                                              - [facility]



                                                               [facility]

 

     LIDOCAINE LIDOCAINE 2019-0      No             10ug                     Com

mon



     HCL 10MG/ML HCL 10MG/ML 4-17                                              S

pirit



               00:00:                                              - [facility]



                                                               [facility]

 

     LIDOCAINE LIDOCAINE 2019-0      No             10mg                     Com

mon



     HCL 10MG/ML HCL 10MG/ML 4-17                                              S

[facility]irit



               00:00:                                              - [facility]



                                                               [facility]

 

     Hyalgan 20 Hyalgan 20 2019-0      No             20mg                     C

ommon



     mg   mg   4-17                                              [facility]



               00:00:                                              - [facility]



                                                               [facility]

 

     Hyalgan 20 Hyalgan 20 2019-0      No             20mg                     C

ommon



     mg   mg   4-17                                              [facility]



               00:00:                                              - [facility]



                                                               [facility]

 

     LIDOCAINE LIDOCAINE 2019-0      No             10ug                     Com

mon



     HCL 10MG/ML HCL 10MG/ML 4-17                                              S

pirit



               00:00:                                              - [facility]



                                                               [facility]

 

     LIDOCAINE LIDOCAINE 2019-0      No             10mg                     Com

mon



     HCL 10MG/ML HCL 10MG/ML 4-17                                              S

pirit



               00:00:                                              - [facility]



                                                               [facility]

 

     Hyalgan 20 Hyalgan 20 2019-0      No             20mg                     C

ommon



     mg   mg   4-17                                              [facility]



               00:00:                                              - [facility]



                                                               [facility]

 

     Hyalgan 20 Hyalgan 20 2019-0      No             20mg                     C

ommon



     mg   mg   4-17                                              [facility]



               00:00:                                              - [facility]



                                                               [facility]

 

     LIDOCAINE LIDOCAINE 2019-0      No             10ug                     Com

mon



     HCL 10MG/ML HCL 10MG/ML 4-17                                              S

pirit



               00:00:                                              - [facility]



                                                               [facility]

 

     LIDOCAINE LIDOCAINE 2019-0      No             10mg                     Com

mon



     HCL 10MG/ML HCL 10MG/ML 4-17                                              S

pirit



               00:00:                                              - [facility]



                                                               [facility]

 

     Hyalgan 20 Hyalgan 20 2019-0      No             20mg                     C

ommon



     mg   mg   4-17                                              [facility]



               00:00:                                              - [facility]



                                                               [facility]

 

     Hyalgan 20 Hyalgan 20 2019-0      No             20mg                     C

ommon



     mg   mg   4-17                                              [facility]



               00:00:                                              - [facility]



                                                               [facility]

 

     LIDOCAINE LIDOCAINE 2019-0      No             10ug                     Com

mon



     HCL 10MG/ML HCL 10MG/ML 4-17                                              S

pirit



               00:00:                                              - [facility]



                                                               [facility]

 

     LIDOCAINE LIDOCAINE 2019-0      No             10mg                     Com

mon



     HCL 10MG/ML HCL 10MG/ML 4-17                                              S

pirit



               00:00:                                              - [facility]



                                                               [facility]

 

     Hyalgan 20 Hyalgan 20 2019-0      No             20mg                     C

ommon



     mg   mg   4-17                                              [facility]



               00:00:                                              - [facility]



                                                               [facility]

 

     Hyalgan 20 Hyalgan 20 2019-0      No             20mg                     C

ommon



     mg   mg   4-17                                              [facility]



               00:00:                                              - [facility]



                                                               [facility]

 

     LIDOCAINE LIDOCAINE 2019-0      No             10ug                     Com

mon



     HCL 10MG/ML HCL 10MG/ML 4-17                                              S

pirit



               00:00:                                              - [facility]



                                                               [facility]

 

     LIDOCAINE LIDOCAINE 2019-0      No             10mg                     Com

mon



     HCL 10MG/ML HCL 10MG/ML 4-17                                              S

pirit



               00:00:                                              - [facility]



                                                               [facility]

 

     Hyalgan 20 Hyalgan 20 2019-0      No             20mg                     C

ommon



     mg   mg   4-17                                              [facility]



               00:00:                                              - [facility]



                                                               [facility]

 

     Hyalgan 20 Hyalgan 20 2019-0      No             20mg                     C

ommon



     mg   mg   4-17                                              [facility]



               00:00:                                              - [facility]



                                                               [facility]

 

     LIDOCAINE LIDOCAINE 2019-0      No             10ug                     Com

mon



     HCL 10MG/ML HCL 10MG/ML 4-17                                              S

pirit



               00:00:                                              - [facility]



                                                               [facility]

 

     LIDOCAINE LIDOCAINE 2019-0      No             10mg                     Com

mon



     HCL 10MG/ML HCL 10MG/ML 4-17                                              S

pirit



               00:00:                                              - [facility]



                                                               [facility]

 

     Hyalgan 20 Hyalgan 20 2019-0      No             20mg                     C

ommon



     mg   mg   4-17                                              [facility]



               00:00:                                              - [facility]



                                                               [facility]

 

     Hyalgan 20 Hyalgan 20 2019-0      No             20mg                     C

ommon



     mg   mg   4-17                                              [facility]



               00:00:                                              - [facility]



                                                               [facility]

 

     LIDOCAINE LIDOCAINE 2019-0      No             10ug                     Com

mon



     HCL 10MG/ML HCL 10MG/ML 4-17                                              S

pirit



               00:00:                                              - [facility]



                                                               [facility]

 

     LIDOCAINE LIDOCAINE 2019-0      No             10mg                     Com

mon



     HCL 10MG/ML HCL 10MG/ML 4-17                                              S

[facility]irit



               00:00:                                              - [facility]



                                                               [facility]

 

     Hyalgan 20 Hyalgan 20 2019-0      No             20mg                     C

ommon



     mg   mg   4-17                                              [facility]



               00:00:                                              - [facility]



                                                               [facility]

 

     Hyalgan 20 Hyalgan 20 2019-0      No             20mg                     C

ommon



     mg   mg   4-17                                              [facility]



               00:00:                                              - [facility]



                                                               [facility]

 

     LIDOCAINE LIDOCAINE 2019-0      No             10ug                     Com

mon



     HCL 10MG/ML HCL 10MG/ML 4-17                                              S

pirit



               00:00:                                              - [facility]



                                                               [facility]

 

     LIDOCAINE LIDOCAINE 2019-0      No             10mg                     Com

mon



     HCL 10MG/ML HCL 10MG/ML 4-17                                              S

[facility]irit



               00:00:                                              - [facility]



                                                               [facility]

 

     Hyalgan 20 Hyalgan 20 2019-0      No             20mg                     C

ommon



     mg   mg   4-17                                              [facility]



               00:00:                                              - [facility]



                                                               [facility]

 

     Hyalgan 20 Hyalgan 20 2019-0      No             20mg                     C

ommon



     mg   mg   4-17                                              [facility]



               00:00:                                              - [facility]



                                                               [facility]

 

     LIDOCAINE LIDOCAINE 2019-0      No             10ug                     Com

mon



     HCL 10MG/ML HCL 10MG/ML 4-17                                              S

pirit



               00:00:                                              - [facility]



                                                               [facility]

 

     LIDOCAINE LIDOCAINE 2019-0      No             10mg                     Com

mon



     HCL 10MG/ML HCL 10MG/ML 4-17                                              S

[facility]irit



               00:00:                                              - [facility]



                                                               [facility]

 

     Hyalgan 20 Hyalgan 20 2019-0      No             20mg                     C

ommon



     mg   mg   4-17                                              [facility]



               00:00:                                              - [facility]



                                                               [facility]

 

     Hyalgan 20 Hyalgan 20 2019-0      No             20mg                     C

ommon



     mg   mg   4-17                                              [facility]



               00:00:                                              - [facility]



                                                               [facility]

 

     LIDOCAINE LIDOCAINE 2019-0      No             10ug                     Com

mon



     HCL 10MG/ML HCL 10MG/ML 4-17                                              S

pirit



               00:00:                                              - [facility]



                                                               [facility]

 

     LIDOCAINE LIDOCAINE 2019-0      No             10mg                     Com

mon



     HCL 10MG/ML HCL 10MG/ML 4-17                                              S

[facility]



               00:00:                                              - [facility]



                                                               [facility]

 

     Hyalgan 20 Hyalgan 20 2019-0      No             20mg                     C

ommon



     mg   mg   4-17                                              [facility]



               00:00:                                              - [facility]



                                                               [facility]

 

     Hyalgan 20 Hyalgan 20 2019-0      No             20mg                     C

ommon



     mg   mg   4-17                                              [facility]



               00:00:                                              - [facility]



                                                               [facility]

 

     LIDOCAINE LIDOCAINE 2019-0      No             10ug                     Com

mon



     HCL 10MG/ML HCL 10MG/ML 4-17                                              S

pirit



               00:00:                                              - [facility]



                                                               [facility]

 

     LIDOCAINE LIDOCAINE 2019-0      No             10mg                     Com

mon



     HCL 10MG/ML HCL 10MG/ML 4-17                                              S

[facility]irit



               00:00:                                              - [facility]



                                                               [facility]

 

     Hyalgan 20 Hyalgan 20 2019-0      No             20mg                     C

ommon



     mg   mg   4-17                                              [facility]



               00:00:                                              - [facility]



                                                               [facility]

 

     Hyalgan 20 Hyalgan 20 2019-0      No             20mg                     C

ommon



     mg   mg   4-17                                              [facility]



               00:00:                                              - [facility]



                                                               [facility]

 

     LIDOCAINE LIDOCAINE 2019-0      No             10ug                     Com

mon



     HCL 10MG/ML HCL 10MG/ML 4-17                                              S

pirit



               00:00:                                              - [facility]



                                                               [facility]

 

     LIDOCAINE LIDOCAINE 2019-0      No             10mg                     Com

mon



     HCL 10MG/ML HCL 10MG/ML 4-17                                              S

pirit



               00:00:                                              - [facility]



                                                               [facility]

 

     Hyalgan 20 Hyalgan 20 2019-0      No             20mg                     C

ommon



     mg   mg   4-17                                              [facility]



               00:00:                                              - [facility]



                                                               [facility]

 

     Hyalgan 20 Hyalgan 20 2019-0      No             20mg                     C

ommon



     mg   mg   4-17                                              [facility]



               00:00:                                              - [facility]



                                                               [facility]

 

     LIDOCAINE LIDOCAINE 2019-0      No             10ug                     Com

mon



     HCL 10MG/ML HCL 10MG/ML 4-17                                              S

pirit



               00:00:                                              - [facility]



                                                               [facility]

 

     LIDOCAINE LIDOCAINE 2019-0      No             10mg                     Com

mon



     HCL 10MG/ML HCL 10MG/ML 4-17                                              S

[facility]irit



               00:00:                                              - [facility]



                                                               [facility]

 

     Hyalgan 20 Hyalgan 20 2019-0      No             20mg                     C

ommon



     mg   mg   4-17                                              [facility]



               00:00:                                              - [facility]



                                                               [facility]

 

     Hyalgan 20 Hyalgan 20 2019-0      No             20mg                     C

ommon



     mg   mg   4-17                                              [facility]



               00:00:                                              - [facility]



                                                               [facility]

 

     LIDOCAINE LIDOCAINE 2019-0      No             10ug                     Com

mon



     HCL 10MG/ML HCL 10MG/ML 4-17                                              S

pirit



               00:00:                                              - [facility]



                                                               [facility]

 

     LIDOCAINE LIDOCAINE 2019-0      No             10mg                     Com

mon



     HCL 10MG/ML HCL 10MG/ML 4-17                                              S

pirit



               00:00:                                              - [facility]



                                                               [facility]

 

     Hyalgan 20 Hyalgan 20 2019-0      No             20mg                     C

ommon



     mg   mg   4-17                                              [facility]



               00:00:                                              - [facility]



                                                               [facility]

 

     Hyalgan 20 Hyalgan 20 2019-0      No             20mg                     C

ommon



     mg   mg   4-17                                              [facility]



               00:00:                                              - [facility]



                                                               [facility]

 

     LIDOCAINE LIDOCAINE 2019-0      No             10ug                     Com

mon



     HCL 10MG/ML HCL 10MG/ML 4-17                                              S

pirit



               00:00:                                              - [facility]



                                                               [facility]

 

     LIDOCAINE LIDOCAINE 2019-0      No             10mg                     Com

mon



     HCL 10MG/ML HCL 10MG/ML 4-17                                              S

pirit



               00:00:                                              - [facility]



                                                               [facility]

 

     Hyalgan 20 Hyalgan 20 2019-0      No             20mg                     C

ommon



     mg   mg   4-17                                              [facility]



               00:00:                                              - [facility]



                                                               [facility]

 

     Hyalgan 20 Hyalgan 20 2019-0      No             20mg                     C

ommon



     mg   mg   4-17                                              [facility]



               00:00:                                              - [facility]



                                                               [facility]

 

     LIDOCAINE LIDOCAINE 2019-0      No             10ug                     Com

mon



     HCL 10MG/ML HCL 10MG/ML 4-17                                              S

pirit



               00:00:                                              - [facility]



                                                               [facility]

 

     LIDOCAINE LIDOCAINE -      No             10mg                     Com

mon



     HCL 10MG/ML HCL 10MG/ML 4-17                                              S

[facility]irit



               00:00:                                              - [facility]



                                                               [facility]

 

     Hyalgan 20 Hyalgan 20 -0      No             20mg                     C

ommon



     mg   mg   4-17                                              [facility]



               00:00:                                              - [facility]



                                                               [facility]

 

     Hyalgan 20 Hyalgan 20 0      No             20mg                     C

ommon



     mg   mg   4-17                                              [facility]



               00:00:                                              - [facility]



                                                               [facility]

 

     LIDOCAINE LIDOCAINE -      No             10ug                     Com

mon



     HCL 10MG/ML HCL 10MG/ML 4-17                                              S

pirit



               00:00:                                              - [facility]



                                                               [facility]

 

     LIDOCAINE LIDOCAINE -      No             10mg                     Com

mon



     HCL 10MG/ML HCL 10MG/ML 4-17                                              S

pirit



               00:00:                                              - [facility]



                                                               [facility]

 

     aspirin      2017-      Yes            81mg QD   Take 81 mg           Meth

jane



     (ECOTRIN)      8-16                               by mouth           st



     81 MG      14:29:                               daily.           [facility]



     enteric      11                                                l



     coated                                                        



     tablet                                                        

 

     pantoprazol            Yes            40mg QD   Take 40 mg           

[facility]



     e         8-16                               by mouth           st



     (PROTONIX)      14:29:                               daily.           [facility]

ta



     40 MG EC      11                                                l



     tablet                                                        

 

     aspirin            Yes            81mg QD   Take 81 mg           Meth

jane



     (ECOTRIN)      8-16                               by mouth           st



     81 MG      14:29:                               daily.           [facility]



     enteric      11                                                l



     coated                                                        



     tablet                                                        

 

     pantoprazol            Yes            40mg QD   Take 40 mg           

[facility]



     e         8-16                               by mouth           st



     (PROTONIX)      14:29:                               daily.           [facility]

ta



     40 MG EC      11                                                l



     tablet                                                        

 

     aspirin      2017      Yes            81mg QD   Take 81 mg           Meth

jane



     (ECOTRIN)      8-16                               by mouth           st



     81 MG      14:29:                               daily.           [facility]



     enteric      11                                                l



     coated                                                        



     tablet                                                        

 

     pantoprazol      2017-0      Yes            40mg QD   Take 40 mg           

[facility]



     e         8-16                               by mouth           st



     (PROTONIX)      14:29:                               daily.           [facility]

ta



     40 MG EC      11                                                l



     tablet                                                        

 

     rosuvastati            Yes                                     [facility]

i



     n (CRESTOR)                                                    st



     10 MG      00:00:                                              [facility]



     tablet      00                                                l

 

     rosuvastati      2017-0      Yes                                     [facility]

i



     n (CRESTOR)                                                    st



     10 MG      00:00:                                              [facility]



     tablet      00                                                l

 

     rosuvastati            Yes                                     [facility]

i



     n (CRESTOR)                                                    st



     10 MG      00:00:                                              [facility]



     tablet      00                                                l

 

     hydroCHLORO            Yes                                     [facility]

i



     thiazide                                                    st



     (HYDRODIURI      00:00:                                              [facility]

a



     L) 25 MG      00                                                l



     tablet                                                        

 

     hydroCHLORO            Yes                                     [facility]

i



     thiazide                                                    st



     (HYDRODIURI      00:00:                                              [facility]

a



     L) 25 MG      00                                                l



     tablet                                                        

 

     hydroCHLORO            Yes                                     [facility]

i



     thiazide                                                    st



     (HYDRODIURI      00:00:                                              [facility]

a



     L) 25 MG      00                                                l



     tablet                                                        

 

     metoprolol            Yes                                     [facility]



     succinate                                                    st



     XL        00:00:                                              [facility]



     (TOPROL-XL)      00                                                l



     25 mg 24 hr                                                        



     tablet                                                        

 

     metoprolol            Yes                                     [facility]



     succinate                                                    st



     XL        00:00:                                              [facility]



     (TOPROL-XL)      00                                                l



     25 mg 24 hr                                                        



     tablet                                                        

 

     metoprolol            Yes                                     [facility]



     succinate                                                    st



     XL        00:00:                                              [facility]



     (TOPROL-XL)      00                                                l



     25 mg 24 hr                                                        



     tablet                                                        

 

     quinapril            Yes                                     [facility]



     (ACCUPRIL)      7                                              st



     40 MG      00:00:                                              [facility]



     tablet      00                                                l

 

     quinapril            Yes                                     [facility]



     (ACCUPRIL)      7                                              st



     40 MG      00:00:                                              [facility]



     tablet      00                                                l

 

     quinapril            Yes                                     [facility]



     (ACCUPRIL)      7                                              st



     40 MG      00:00:                                              [facility]



     tablet      00                                                l

 

     Metoprolol Metoprolol           Yes  [facility]                1 tablet       

    Common



     Succinate Succinate                                                   [facility]

t



     ER   ER                                                     [facility]

 

     Flonase Flonase           Yes  [facility]                1 spray in           

Common



                                                  each           [facility]



                                                  nostril           [facility]

 

     Hydrochloro Hydrochloro           Yes  [facility]                1 tablet     

      Common



     thiaz[facility]iazide                                    [facility]           [facility]

t



                                                  morning           [facility]

 

     Fish Oil Fish Oil           Yes  [facility]                1 capsule          

 Common



                                                                 [facility]



                                                                 [facility]

 

     Accupril Accupril           Yes  [facility] [place]                1 tablet           

Common



                                                                 [facility]



                                                                 [facility]

 

     Quinapril Quinapril           No                       Quinapril           



     HCl 40 MG HCl 40 MG                                    HCl 40 MG           

 

     Boostrix Boostrix           No                       Boostrix           

 

     amLODIPine amLODIPine           No                       amLODIPine        

   



     Besylate 5 Besylate 5                                    Besylate 5        

   



     MG   MG                                      MG             

 

     Zithromax Zithromax           No                  QD   Zithromax           



     Z-Bartolome 250 Z-Bartolome 250                                    Z-Bartolome 250           



     MG   MG                                      MG             

 

     predniSONE predniSONE           No                       predniSONE        

   

 

     Dicyclomine Dicyclomine           No                       Dicyclomin      

     



     HCl  HCl                                     e HCl           

 

     Fish Oil Fish Oil           No             1{capsu QD   Fish Oil           



     1000 MG 1000 MG                          le}       1000 MG           

 

     Benzonatate Benzonatate           No                       Benzonatat      

     



                                                  e              

 

     Benzonatate Benzonatate           No                       Benzonatat      

     



     100  MG                                    e 100 MG           

 

     Quinapril Quinapril           No                       Quinapril           



     HCl  HCl                                     HCl            

 

     Ondansetron Ondansetron           No                       Ondansetro      

     



     HCl  HCl                                     n HCl           

 

     Flonase 50 Flonase 50           No             1{spray QD   Flonase 50     

      



     MCG/ACT MCG/ACT                          _in_eac      MCG/ACT           



                                        h_nostr                     



                                        il}                      

 

     Accupril 40 Accupril 40           No             1{table QD   Accupril     

      



     MG   MG                            t}        40 MG           

 

     hydroCHLORO hydroCHLORO           No             1{table QD   hydroCHLOR   

        



     thiazide 25 thiazide 25                          t_in_th      Othiazide    

       



     MG   MG                            e_morni      25 MG           



                                        ng}                      

 

     Metoprolol Metoprolol           No                       Metoprolol        

   



     Succinate Succinate                                    Succinate           



      MG  MG                                     MG           

 

     Quinapril Quinapril           No                       Quinapril           



     HCl 40 MG HCl 40 MG                                    HCl 40 MG           

 

     Boostrix Boostrix           No                       Boostrix           

 

     amLODIPine amLODIPine           No                       amLODIPine        

   



     Besylate 5 Besylate 5                                    Besylate 5        

   



     MG   MG                                      MG             

 

     Zithromax Zithromax           No                  QD   Zithromax           



     Z-Bartolome 250 Z-Bartolome 250                                    Z-Bartolome 250           



     MG   MG                                      MG             

 

     predniSONE predniSONE           No                       predniSONE        

   

 

     Dicyclomine Dicyclomine           No                       Dicyclomin      

     



     HCl  HCl                                     e HCl           

 

     Fish Oil Fish Oil           No             1{capsu QD   Fish Oil           



     1000 MG 1000 MG                          le}       1000 MG           

 

     Benzonatate Benzonatate           No                       Benzonatat      

     



                                                  e              

 

     Benzonatate Benzonatate           No                       Benzonatat      

     



     100  MG                                    e 100 MG           

 

     Quinapril Quinapril           No                       Quinapril           



     HCl  HCl                                     HCl            

 

     Ondansetron Ondansetron           No                       Ondansetro      

     



     HCl  HCl                                     n HCl           

 

     Flonase 50 Flonase 50           No             1{spray QD   Flonase 50     

      



     MCG/ACT MCG/ACT                          _in_eac      MCG/ACT           



                                        h_nostr                     



                                        il}                      

 

     Accupril 40 Accupril 40           No             1{table QD   Accupril     

      



     MG   MG                            t}        40 MG           

 

     hydroCHLORO hydroCHLORO           No             1{table QD   hydroCHLOR   

        



     thiazide 25 thiazide 25                          t_in_th      Othiazide    

       



     MG   MG                            e_morni      25 MG           



                                        ng}                      

 

     Metoprolol Metoprolol           No                       Metoprolol        

   



     Succinate Succinate                                    Succinate           



      MG  MG                                     MG           

 

     Boostrix Boostrix           No                       Boostrix           

 

     Metoprolol Metoprolol           No                       Metoprolol        

   



     Succinate Succinate                                    Succinate           



      MG  MG                                     MG           

 

     predniSONE predniSONE           No                       predniSONE        

   

 

     Flonase 50 Flonase 50           No             1{spray QD   Flonase 50     

      



     MCG/ACT MCG/ACT                          _in_eac      MCG/ACT           



                                        h_nostr                     



                                        il}                      

 

     Quinapril Quinapril           No                       Quinapril           



     HCl 40 MG HCl 40 MG                                    HCl 40 MG           

 

     Dicyclomine Dicyclomine           No                       Dicyclomin      

     



     HCl  HCl                                     e HCl           

 

     amLODIPine amLODIPine           No                       amLODIPine        

   



     Besylate 5 Besylate 5                                    Besylate 5        

   



     MG   MG                                      MG             

 

     Benzonatate Benzonatate           No                       Benzonatat      

     



                                                  e              

 

     Zithromax Zithromax           No                  QD   Zithromax           



     Z-Bartolome 250 Z-Bartolome 250                                    Z-Bartolome 250           



     MG   MG                                      MG             

 

     Accupril 40 Accupril 40           No             1{table QD   Accupril     

      



     MG   MG                            t}        40 MG           

 

     hydroCHLORO hydroCHLORO           No             1{table QD   hydroCHLOR   

        



     thiazide 25 thiazide 25                          t_in_th      Othiazide    

       



     MG   MG                            e_morni      25 MG           



                                        ng}                      

 

     Benzonatate Benzonatate           No                       Benzonatat      

     



     100  MG                                    e 100 MG           

 

     Fish Oil Fish Oil           No             1{capsu QD   Fish Oil           



     1000 MG 1000 MG                          le}       1000 MG           

 

     Quinapril Quinapril           No                       Quinapril           



     HCl  HCl                                     HCl            

 

     Ondansetron Ondansetron           No                       Ondansetro      

     



     HCl  HCl                                     n HCl           

 

     Ondansetron Ondansetron           No                       Ondansetro      

     



     HCl  HCl                                     n HCl           

 

     Boostrix Boostrix           No                       Boostrix           

 

     hydroCHLORO hydroCHLORO           No             1{table QD   hydroCHLOR   

        



     thiazide 25 thiazide 25                          t_in_th      Othiazide    

       



     MG   MG                            e_morni      25 MG           



                                        ng}                      

 

     Quinapril Quinapril           No                       Quinapril           



     HCl  HCl                                     HCl            

 

     Benzonatate Benzonatate           No                       Benzonatat      

     



     100  MG                                    e 100 MG           

 

     Metoprolol Metoprolol           No                       Metoprolol        

   



     Succinate Succinate                                    Succinate           



      MG  MG                                     MG           

 

     hydroCHLORO hydroCHLORO           No             1{table QD   hydroCHLOR   

        



     thiazide 25 thiazide 25                          t_in_th      Othiazide    

       



     MG   MG                            e_morni      25 MG           



                                        ng}                      

 

     Quinapril Quinapril           No                       Quinapril           



     HCl 40 MG HCl 40 MG                                    HCl 40 MG           

 

     Fish Oil Fish Oil           No             1{capsu QD   Fish Oil           



     1000 MG 1000 MG                          le}       1000 MG           

 

     Quinapril Quinapril           No                       Quinapril           



     HCl  HCl                                     HCl            

 

     Flonase 50 Flonase 50           No             1{spray QD   Flonase 50     

      



     MCG/ACT MCG/ACT                          _in_eac      MCG/ACT           



                                        h_nostr                     



                                        il}                      

 

     Ondansetron Ondansetron           No                       Ondansetro      

     



     HCl  HCl                                     n HCl           

 

     predniSONE predniSONE           No                       predniSONE        

   

 

     Boostrix Boostrix           No                       Boostrix           

 

     Dicyclomine Dicyclomine           No                       Dicyclomin      

     



     HCl  HCl                                     e HCl           

 

     Zithromax Zithromax           No                  QD   Zithromax           



     Z-Bartolome 250 Z-Bartolome 250                                    Z-Bartolome 250           



     MG   MG                                      MG             

 

     amLODIPine amLODIPine           No                       amLODIPine        

   



     Besylate 5 Besylate 5                                    Besylate 5        

   



     MG   MG                                      MG             

 

     Accupril 40 Accupril 40           No             1{table QD   Accupril     

      



     MG   MG                            t}        40 MG           

 

     predniSONE predniSONE           No                       predniSONE        

   

 

     Benzonatate Benzonatate           No                       Benzonatat      

     



                                                  e              

 

     Accupril 40 Accupril 40           No             1{table QD   Accupril     

      



     MG   MG                            t}        40 MG           

 

     amLODIPine amLODIPine           No             1{table      amLODIPine     

      



     Besylate 5 Besylate 5                          t}        Besylate 5        

   



     MG   MG                                      MG             

 

     Benzonatate Benzonatate           No                       Benzonatat      

     



                                                  e              

 

     Quinapril Quinapril           No                       Quinapril           



     HCl  HCl                                     HCl            

 

     Benzonatate Benzonatate           No                       Benzonatat      

     



     100  MG                                    e 100 MG           

 

     Metoprolol Metoprolol           No                       Metoprolol        

   



     Succinate Succinate                                    Succinate           



      MG  MG                                     MG           

 

     Quinapril Quinapril           No                       Quinapril           



     HCl 40 MG HCl 40 MG                                    HCl 40 MG           

 

     hydroCHLORO hydroCHLORO           No             1{table QD   hydroCHLOR   

        



     thiazide 25 thiazide 25                          t_in_th      Othiazide    

       



     MG   MG                            e_morni      25 MG           



                                        ng}                      

 

     Quinapril Quinapril           No                       Quinapril           



     HCl 40 MG HCl 40 MG                                    HCl 40 MG           

 

     Fish Oil Fish Oil           No             1{capsu QD   Fish Oil           



     1000 MG 1000 MG                          le}       1000 MG           

 

     Flonase 50 Flonase 50           No             1{spray QD   Flonase 50     

      



     MCG/ACT MCG/ACT                          _in_eac      MCG/ACT           



                                        h_nostr                     



                                        il}                      

 

     Ondansetron Ondansetron           No                       Ondansetro      

     



     HCl  HCl                                     n HCl           

 

     predniSONE predniSONE           No                       predniSONE        

   

 

     Boostrix Boostrix           No                       Boostrix           

 

     Dicyclomine Dicyclomine           No                       Dicyclomin      

     



     HCl  HCl                                     e HCl           

 

     Metoprolol Metoprolol           No             1{table      Metoprolol     

      



     Succinate Succinate                          t}        Succinate           



      MG  MG                                     MG           

 

     Zithromax Zithromax           No                  QD   Zithromax           



     Z-Bartolome 250 Z-Bartolome 250                                    Z-Bartolome 250           



     MG   MG                                      MG             

 

     amLODIPine amLODIPine           No                       amLODIPine        

   



     Besylate 5 Besylate 5                                    Besylate 5        

   



     MG   MG                                      MG             

 

     Accupril 40 Accupril 40           No             1{table QD   Accupril     

      



     MG   MG                            t}        40 MG           

 

     Benzonatate Benzonatate           No                       Benzonatat      

     



                                                  e              

 

     Dicyclomine Dicyclomine           No                       Dicyclomin      

     



     HCl  HCl                                     e HCl           

 

     hydroCHLORO hydroCHLORO           No             1{table QD   hydroCHLOR   

        



     thiazide 25 thiazide 25                          t_in_th      Othiazide    

       



     MG   MG                            e_morni      25 MG           



                                        ng}                      

 

     Benzonatate Benzonatate           No                       Benzonatat      

     



     100  MG                                    e 100 MG           

 

     Benzonatate Benzonatate           No                       Benzonatat      

     



                                                  e              

 

     Benzonatate Benzonatate           No                       Benzonatat      

     



     100  MG                                    e 100 MG           

 

     Quinapril Quinapril           No                       Quinapril           



     HCl  HCl                                     HCl            

 

     Metoprolol Metoprolol           No                       Metoprolol        

   



     Succinate Succinate                                    Succinate           



      MG  MG                                     MG           

 

     Quinapril Quinapril           No                       Quinapril           



     HCl 40 MG HCl 40 MG                                    HCl 40 MG           

 

     Fish Oil Fish Oil           No             1{capsu QD   Fish Oil           



     1000 MG 1000 MG                          le}       1000 MG           

 

     Flonase 50 Flonase 50           No             1{spray QD   Flonase 50     

      



     MCG/ACT MCG/ACT                          _in_eac      MCG/ACT           



                                        h_nostr                     



                                        il}                      

 

     Ondansetron Ondansetron           No                       Ondansetro      

     



     HCl  HCl                                     n HCl           

 

     Fish Oil Fish Oil           No             1{capsu QD   Fish Oil           



     1000 MG 1000 MG                          le}       1000 MG           

 

     predniSONE predniSONE           No                       predniSONE        

   

 

     Dicyclomine Dicyclomine           No                       Dicyclomin      

     



     HCl  HCl                                     e HCl           

 

     Zithromax Zithromax           No                  QD   Zithromax           



     Z-Bartolome 250 Z-Bartolome 250                                    Z-Bartolome 250           



     MG   MG                                      MG             

 

     Boostrix Boostrix           No                       Boostrix           

 

     hydroCHLORO hydroCHLORO           No             1{table QD   hydroCHLOR   

        



     thiazide 25 thiazide 25                          t_in_th      Othiazide    

       



     MG   MG                            e_morni      25 MG           



                                        ng}                      

 

     amLODIPine amLODIPine           No                       amLODIPine        

   



     Besylate 5 Besylate 5                                    Besylate 5        

   



     MG   MG                                      MG             

 

     Accupril 40 Accupril 40           No             1{table QD   Accupril     

      



     MG   MG                            t}        40 MG           

 

     Flonase 50 Flonase 50           No             1{spray QD   Flonase 50     

      



     MCG/ACT MCG/ACT                          _in_eac      MCG/ACT           



                                        h_nostr                     



                                        il}                      

 

     Benzonatate Benzonatate           No             1{capsu      Benzonatat   

        



     100  MG                          le_as_n      e 100 MG           



                                        eeded}                     

 

     Metoprolol Metoprolol           No                       Metoprolol        

   



     Succinate Succinate                                    Succinate           



      MG  MG                                     MG           

 

     Flonase 50 Flonase 50           No             1{spray QD   Flonase 50     

      



     MCG/ACT MCG/ACT                          _in_eac      MCG/ACT           



                                        h_nostr                     



                                        il}                      

 

     Accupril 40 Accupril 40           No             1{table QD   Accupril     

      



     MG   MG                            t}        40 MG           

 

     Benzonatate Benzonatate           No                       Benzonatat      

     



     100  MG                                    e 100 MG           

 

     Fish Oil Fish Oil           No             1{capsu QD   Fish Oil           



     1000 MG 1000 MG                          le}       1000 MG           

 

     Dicyclomine Dicyclomine           No                       Dicyclomin      

     



     HCl  HCl                                     e HCl           

 

     predniSONE predniSONE           No                       predniSONE        

   

 

     Ondansetron Ondansetron           No                       Ondansetro      

     



     HCl  HCl                                     n HCl           

 

     hydroCHLORO hydroCHLORO           No             1{table QD   hydroCHLOR   

        



     thiazide 25 thiazide 25                          t_in_th      Othiazide    

       



     MG   MG                            e_morni      25 MG           



                                        ng}                      

 

     Zithromax Zithromax           No                  QD   Zithromax           



     Z-Bartolome 250 Z-Bartolome 250                                    Z-Bartolome 250           



     MG   MG                                      MG             

 

     Boostrix Boostrix           No                       Boostrix           

 

     Quinapril Quinapril           No                       Quinapril           



     HCl  HCl                                     HCl            

 

     amLODIPine amLODIPine           No                       amLODIPine        

   



     Besylate 5 Besylate 5                                    Besylate 5        

   



     MG   MG                                      MG             

 

     Benzonatate Benzonatate           No                       Benzonatat      

     



                                                  e              

 

     Quinapril Quinapril           No                       Quinapril           



     HCl 40 MG HCl 40 MG                                    HCl 40 MG           

 

     Metoprolol Metoprolol           No                       Metoprolol        

   



     Succinate Succinate                                    Succinate           



      MG  MG                                     MG           

 

     Flonase 50 Flonase 50           No             1{spray QD   Flonase 50     

      



     MCG/ACT MCG/ACT                          _in_eac      MCG/ACT           



                                        h_nostr                     



                                        il}                      

 

     Accupril 40 Accupril 40           No             1{table QD   Accupril     

      



     MG   MG                            t}        40 MG           

 

     Benzonatate Benzonatate           No                       Benzonatat      

     



     100  MG                                    e 100 MG           

 

     Fish Oil Fish Oil           No             1{capsu QD   Fish Oil           



     1000 MG 1000 MG                          le}       1000 MG           

 

     Dicyclomine Dicyclomine           No                       Dicyclomin      

     



     HCl  HCl                                     e HCl           

 

     predniSONE predniSONE           No                       predniSONE        

   

 

     Ondansetron Ondansetron           No                       Ondansetro      

     



     HCl  HCl                                     n HCl           

 

     hydroCHLORO hydroCHLORO           No             1{table QD   hydroCHLOR   

        



     thiazide 25 thiazide 25                          t_in_th      Othiazide    

       



     MG   MG                            e_morni      25 MG           



                                        ng}                      

 

     Zithromax Zithromax           No                  QD   Zithromax           



     Z-Bartolome 250 Z-Bartolome 250                                    Z-Bartolome 250           



     MG   MG                                      MG             

 

     Boostrix Boostrix           No                       Boostrix           

 

     Quinapril Quinapril           No                       Quinapril           



     HCl  HCl                                     HCl            

 

     amLODIPine amLODIPine           No                       amLODIPine        

   



     Besylate 5 Besylate 5                                    Besylate 5        

   



     MG   MG                                      MG             

 

     Benzonatate Benzonatate           No                       Benzonatat      

     



                                                  e              

 

     Quinapril Quinapril           No                       Quinapril           



     HCl 40 MG HCl 40 MG                                    HCl 40 MG           

 

     Metoprolol Metoprolol           No                       Metoprolol        

   



     Succinate Succinate                                    Succinate           



      MG  MG                                     MG           

 

     Flonase 50 Flonase 50           No             1{spray QD   Flonase 50     

      



     MCG/ACT MCG/ACT                          _in_eac      MCG/ACT           



                                        h_nostr                     



                                        il}                      

 

     Accupril 40 Accupril 40           No             1{table QD   Accupril     

      



     MG   MG                            t}        40 MG           

 

     Benzonatate Benzonatate           No                       Benzonatat      

     



     100  MG                                    e 100 MG           

 

     Fish Oil Fish Oil           No             1{capsu QD   Fish Oil           



     1000 MG 1000 MG                          le}       1000 MG           

 

     Dicyclomine Dicyclomine           No                       Dicyclomin      

     



     HCl  HCl                                     e HCl           

 

     predniSONE predniSONE           No                       predniSONE        

   

 

     Ondansetron Ondansetron           No                       Ondansetro      

     



     HCl  HCl                                     n HCl           

 

     hydroCHLORO hydroCHLORO           No             1{table QD   hydroCHLOR   

        



     thiazide 25 thiazide 25                          t_in_th      Othiazide    

       



     MG   MG                            e_morni      25 MG           



                                        ng}                      

 

     Zithromax Zithromax           No                  QD   Zithromax           



     Z-Bartolome 250 Z-Bartolome 250                                    Z-Bartolome 250           



     MG   MG                                      MG             

 

     Boostrix Boostrix           No                       Boostrix           

 

     Quinapril Quinapril           No                       Quinapril           



     HCl  HCl                                     HCl            

 

     amLODIPine amLODIPine           No                       amLODIPine        

   



     Besylate 5 Besylate 5                                    Besylate 5        

   



     MG   MG                                      MG             

 

     Benzonatate Benzonatate           No                       Benzonatat      

     



                                                  e              

 

     Quinapril Quinapril           No                       Quinapril           



     HCl 40 MG HCl 40 MG                                    HCl 40 MG           

 

     predniSONE predniSONE           No                       predniSONE        

   

 

     Accupril 40 Accupril 40           No             1{table QD   Accupril     

      



     MG   MG                            t}        40 MG           

 

     Boostrix Boostrix           No                       Boostrix           

 

     Benzonatate Benzonatate           No                       Benzonatat      

     



                                                  e              

 

     Quinapril Quinapril           No                       Quinapril           



     HCl  HCl                                     HCl            

 

     Flonase 50 Flonase 50           No             1{spray QD   Flonase 50     

      



     MCG/ACT MCG/ACT                          _in_eac      MCG/ACT           



                                        h_nostr                     



                                        il}                      

 

     Quinapril Quinapril           No                       Quinapril           



     HCl 40 MG HCl 40 MG                                    HCl 40 MG           

 

     Benzonatate Benzonatate           No                       Benzonatat      

     



     100  MG                                    e 100 MG           

 

     Zithromax Zithromax           No                  QD   Zithromax           



     Z-Bartolome 250 Z-Bartolome 250                                    Z-Bartolome 250           



     MG   MG                                      MG             

 

     hydroCHLORO hydroCHLORO           No             1{table QD   hydroCHLOR   

        



     thiazide 25 thiazide 25                          t_in_th      Othiazide    

       



     MG   MG                            e_morni      25 MG           



                                        ng}                      

 

     Fish Oil Fish Oil           No             1{capsu QD   Fish Oil           



     1000 MG 1000 MG                          le}       1000 MG           

 

     Ondansetron Ondansetron           No                       Ondansetro      

     



     HCl  HCl                                     n HCl           

 

     Metoprolol Metoprolol           No                       Metoprolol        

   



     Succinate Succinate                                    Succinate           



      MG  MG                                     MG           

 

     amLODIPine amLODIPine           No                       amLODIPine        

   



     Besylate 5 Besylate 5                                    Besylate 5        

   



     MG   MG                                      MG             

 

     Dicyclomine Dicyclomine           No                       Dicyclomin      

     



     HCl  HCl                                     e HCl           

 

     Boostrix Boostrix           No                       Boostrix           

 

     Benzonatate Benzonatate           No                       Benzonatat      

     



                                                  e              

 

     amLODIPine amLODIPine           No                       amLODIPine        

   



     Besylate 5 Besylate 5                                    Besylate 5        

   



     MG   MG                                      MG             

 

     hydroCHLORO hydroCHLORO           No             1{table QD   hydroCHLOR   

        



     thiazide 25 thiazide 25                          t_in_th      Othiazide    

       



     MG   MG                            e_morni      25 MG           



                                        ng}                      

 

     hydroCHLORO hydroCHLORO           No             1{table QD   hydroCHLOR   

        



     thiazide 25 thiazide 25                          t_in_th      Othiazide    

       



     MG   MG                            e_morni      25 MG           



                                        ng}                      

 

     Metoprolol Metoprolol           No                       Metoprolol        

   



     Succinate Succinate                                    Succinate           



      MG  MG                                     MG           

 

     Ondansetron Ondansetron           No                       Ondansetro      

     



     HCl  HCl                                     n HCl           

 

     Metoprolol Metoprolol           No             1{table      Metoprolol     

      



     Succinate Succinate                          t}        Succinate           



      MG  MG                                     MG           

 

     Quinapril Quinapril           No                       Quinapril           



     HCl 40 MG HCl 40 MG                                    HCl 40 MG           

 

     Quinapril Quinapril           No                       Quinapril           



     HCl  HCl                                     HCl            

 

     Flonase 50 Flonase 50           No             1{spray QD   Flonase 50     

      



     MCG/ACT MCG/ACT                          _in_eac      MCG/ACT           



                                        h_nostr                     



                                        il}                      

 

     Dicyclomine Dicyclomine           No                       Dicyclomin      

     



     HCl  HCl                                     e HCl           

 

     predniSONE predniSONE           No                       predniSONE        

   

 

     Benzonatate Benzonatate           No                       Benzonatat      

     



     100  MG                                    e 100 MG           

 

     amLODIPine amLODIPine           No             1{table      amLODIPine     

      



     Besylate 5 Besylate 5                          t}        Besylate 5        

   



     MG   MG                                      MG             

 

     Accupril 40 Accupril 40           No             1{table QD   Accupril     

      



     MG   MG                            t}        40 MG           

 

     Fish Oil Fish Oil           No             1{capsu QD   Fish Oil           



     1000 MG 1000 MG                          le}       1000 MG           

 

     Zithromax Zithromax           No                  QD   Zithromax           



     Z-Bartolome 250 Z-Bartolome 250                                    Z-Bartolome 250           



     MG   MG                                      MG             

 

     Boostrix Boostrix           No                       Boostrix           

 

     Benzonatate Benzonatate           No                       Benzonatat      

     



                                                  e              

 

     amLODIPine amLODIPine           No                       amLODIPine        

   



     Besylate 5 Besylate 5                                    Besylate 5        

   



     MG   MG                                      MG             

 

     hydroCHLORO hydroCHLORO           No             1{table QD   hydroCHLOR   

        



     thiazide 25 thiazide 25                          t_in_th      Othiazide    

       



     MG   MG                            e_morni      25 MG           



                                        ng}                      

 

     hydroCHLORO hydroCHLORO           No             1{table QD   hydroCHLOR   

        



     thiazide 25 thiazide 25                          t_in_th      Othiazide    

       



     MG   MG                            e_morni      25 MG           



                                        ng}                      

 

     Metoprolol Metoprolol           No                       Metoprolol        

   



     Succinate Succinate                                    Succinate           



      MG  MG                                     MG           

 

     Ondansetron Ondansetron           No                       Ondansetro      

     



     HCl  HCl                                     n HCl           

 

     Metoprolol Metoprolol           No             1{table      Metoprolol     

      



     Succinate Succinate                          t}        Succinate           



      MG  MG                                     MG           

 

     Quinapril Quinapril           No                       Quinapril           



     HCl 40 MG HCl 40 MG                                    HCl 40 MG           

 

     Quinapril Quinapril           No                       Quinapril           



     HCl  HCl                                     HCl            

 

     Flonase 50 Flonase 50           No             1{spray QD   Flonase 50     

      



     MCG/ACT MCG/ACT                          _in_eac      MCG/ACT           



                                        h_nostr                     



                                        il}                      

 

     Dicyclomine Dicyclomine           No                       Dicyclomin      

     



     HCl  HCl                                     e HCl           

 

     predniSONE predniSONE           No                       predniSONE        

   

 

     Benzonatate Benzonatate           No                       Benzonatat      

     



     100  MG                                    e 100 MG           

 

     amLODIPine amLODIPine           No             1{table      amLODIPine     

      



     Besylate 5 Besylate 5                          t}        Besylate 5        

   



     MG   MG                                      MG             

 

     Accupril 40 Accupril 40           No             1{table QD   Accupril     

      



     MG   MG                            t}        40 MG           

 

     Benzonatate Benzonatate           No                       Benzonatat      

     



     100  MG                                    e 100 MG           

 

     Fish Oil Fish Oil           No             1{capsu QD   Fish Oil           



     1000 MG 1000 MG                          le}       1000 MG           

 

     Zithromax Zithromax           No                  QD   Zithromax           



     Z-Bartolome 250 Z-Bartolome 250                                    Z-Bartolome 250           



     MG   MG                                      MG             

 

     hydroCHLORO hydroCHLORO           No             1{table QD   hydroCHLOR   

        



     thiazide 25 thiazide 25                          t_in_th      Othiazide    

       



     MG   MG                            e_morni      25 MG           



                                        ng}                      

 

     Quinapril Quinapril           No                       Quinapril           



     HCl  HCl                                     HCl            

 

     predniSONE predniSONE           No                       predniSONE        

   

 

     Accupril 40 Accupril 40           No             1{table QD   Accupril     

      



     MG   MG                            t}        40 MG           

 

     Magnesium Magnesium           No                       Magnesium           

 

     amLODIPine amLODIPine           No                       amLODIPine        

   



     Besylate 5 Besylate 5                                    Besylate 5        

   



     MG   MG                                      MG             

 

     Benzonatate Benzonatate           No                       Benzonatat      

     



                                                  e              

 

     Metoprolol Metoprolol           No                       Metoprolol        

   



     Succinate Succinate                                    Succinate           



      MG  MG                                     MG           

 

     Potassium Potassium           No                       Potassium           

 

     Metoprolol Metoprolol           No             1{table      Metoprolol     

      



     Succinate Succinate                          t}        Succinate           



      MG  MG                                     MG           

 

     Dicyclomine Dicyclomine           No                       Dicyclomin      

     



     HCl  HCl                                     e HCl           

 

     amLODIPine amLODIPine           No             1{table      amLODIPine     

      



     Besylate 5 Besylate 5                          t}        Besylate 5        

   



     MG   MG                                      MG             

 

     Quinapril Quinapril           No                       Quinapril           



     HCl  HCl                                     HCl            

 

     Ondansetron Ondansetron           No                       Ondansetro      

     



     HCl  HCl                                     n HCl           

 

     predniSONE predniSONE           No                       predniSONE        

   

 

     Quinapril Quinapril           No                       Quinapril           



     HCl 40 MG HCl 40 MG                                    HCl 40 MG           

 

     Flonase 50 Flonase 50           No             1{spray QD   Flonase 50     

      



     MCG/ACT MCG/ACT                          _in_eac      MCG/ACT           



                                        h_nostr                     



                                        il}                      

 

     Accupril 40 Accupril 40           No             1{table QD   Accupril     

      



     MG   MG                            t}        40 MG           

 

     Fish Oil Fish Oil           No             1{capsu QD   Fish Oil           



     1000 MG 1000 MG                          le}       1000 MG           

 

     Boostrix Boostrix           No                       Boostrix           

 

     Benzonatate Benzonatate           No                       Benzonatat      

     



                                                  e              

 

     hydroCHLORO hydroCHLORO           No             1{table QD   hydroCHLOR   

        



     thiazide 25 thiazide 25                          t_in_th      Othiazide    

       



     MG   MG                            e_morni      25 MG           



                                        ng}                      

 

     Zithromax Zithromax           No                  QD   Zithromax           



     Z-Bartolome 250 Z-Bartolome 250                                    Z-Bartolome 250           



     MG   MG                                      MG             

 

     hydroCHLORO hydroCHLORO           No             1{table QD   hydroCHLOR   

        



     thiazide 25 thiazide 25                          t_in_th      Othiazide    

       



     MG   MG                            e_morni      25 MG           



                                        ng}                      

 

     Benzonatate Benzonatate           No                       Benzonatat      

     



     100  MG                                    e 100 MG           

 

     amLODIPine amLODIPine           No             1{table      amLODIPine     

      



     Besylate 5 Besylate 5                          t}        Besylate 5        

   



     MG   MG                                      MG             

 

     Quinapril Quinapril           No                       Quinapril           



     HCl 40 MG HCl 40 MG                                    HCl 40 MG           

 

     Metoprolol Metoprolol           No             1{table      Metoprolol     

      



     Succinate Succinate                          t}        Succinate           



      MG  MG                                     MG           

 

     Boostrix Boostrix           No                       Boostrix           

 

     hydroCHLORO hydroCHLORO           No             1{table QD   hydroCHLOR   

        



     thiazide 25 thiazide 25                          t_in_th      Othiazide    

       



     MG   MG                            e_morni      25 MG           



                                        ng}                      

 

     predniSONE predniSONE           No                       predniSONE        

   

 

     Metoprolol Metoprolol           No                       Metoprolol        

   



     Succinate Succinate                                    Succinate           



      MG  MG                                     MG           

 

     Boostrix Boostrix           No                       Boostrix           

 

     Dicyclomine Dicyclomine           No                       Dicyclomin      

     



     HCl  HCl                                     e HCl           

 

     Dicyclomine Dicyclomine           No                       Dicyclomin      

     



     HCl  HCl                                     e HCl           

 

     Potassium Potassium           No                       Potassium           

 

     Quinapril Quinapril           No                       Quinapril           



     HCl  HCl                                     HCl            

 

     Magnesium Magnesium           No                       Magnesium           

 

     amLODIPine amLODIPine           No                       amLODIPine        

   



     Besylate 5 Besylate 5                                    Besylate 5        

   



     MG   MG                                      MG             

 

     hydroCHLORO hydroCHLORO           No             1{table QD   hydroCHLOR   

        



     thiazide 25 thiazide 25                          t_in_th      Othiazide    

       



     MG   MG                            e_morni      25 MG           



                                        ng}                      

 

     Accupril 40 Accupril 40           No             1{table QD   Accupril     

      



     MG   MG                            t}        40 MG           

 

     amLODIPine amLODIPine           No             1{table      amLODIPine     

      



     Besylate 5 Besylate 5                          t}        Besylate 5        

   



     MG   MG                                      MG             

 

     Zithromax Zithromax           No                  QD   Zithromax           



     Z-Bartolome 250 Z-Bartolome 250                                    Z-Bartolome 250           



     MG   MG                                      MG             

 

     Fish Oil Fish Oil           No             1{capsu QD   Fish Oil           



     1000 MG 1000 MG                          le}       1000 MG           

 

     Azithromyci Azithromyci           No                  QD   Azithromyc      

     



     n 250 MG n 250 MG                                    in 250 MG           

 

     Fish Oil Fish Oil           No             1{capsu QD   Fish Oil           



     1000 MG 1000 MG                          le}       1000 MG           

 

     hydroCHLORO hydroCHLORO           No             1{table QD   hydroCHLOR   

        



     thiazide 25 thiazide 25                          t_in_th      Othiazide    

       



     MG   MG                            e_morni      25 MG           



                                        ng}                      

 

     Ondansetron Ondansetron           No                       Ondansetro      

     



     HCl  HCl                                     n HCl           

 

     Benzonatate Benzonatate           No                       Benzonatat      

     



                                                  e              

 

     Metoprolol Metoprolol           No             1{table      Metoprolol     

      



     Succinate Succinate                          t}        Succinate           



      MG  MG                                     MG           

 

     Flonase 50 Flonase 50           No             1{spray QD   Flonase 50     

      



     MCG/ACT MCG/ACT                          _in_eac      MCG/ACT           



                                        h_nostr                     



                                        il}                      

 

     Flonase 50 Flonase 50           No             1{spray QD   Flonase 50     

      



     MCG/ACT MCG/ACT                          _in_eac      MCG/ACT           



                                        h_nostr                     



                                        il}                      

 

     Benzonatate Benzonatate           No                       Benzonatat      

     



     100  MG                                    e 100 MG           

 

     Albuterol Albuterol           No             2{puff_ 6xD  Albuterol        

   



     Sulfate HFA Sulfate HFA                          as_need      Sulfate      

     



     108 (90 108 (90                          ed}                  



     Base) Base)                                    (90 Base)           



     MCG/ACT MCG/ACT                                    MCG/ACT           

 

     Benzonatate Benzonatate           No             1{capsu TID  Benzonatat   

        



     200  MG                          le}       e 200 MG           

 

     Quinapril Quinapril           No                       Quinapril           



     HCl 40 MG HCl 40 MG                                    HCl 40 MG           

 

     Ondansetron Ondansetron           No                       Ondansetro      

     



     HCl  HCl                                     n HCl           

 

     predniSONE predniSONE           No                       predniSONE        

   

 

     Metoprolol Metoprolol           No                       Metoprolol        

   



     Succinate Succinate                                    Succinate           



      MG  MG                                     MG           

 

     Boostrix Boostrix           No                       Boostrix           

 

     Dicyclomine Dicyclomine           No                       Dicyclomin      

     



     HCl  HCl                                     e HCl           

 

     Potassium Potassium           No                       Potassium           

 

     Quinapril Quinapril           No                       Quinapril           



     HCl  HCl                                     HCl            

 

     Magnesium Magnesium           No                       Magnesium           

 

     amLODIPine amLODIPine           No                       amLODIPine        

   



     Besylate 5 Besylate 5                                    Besylate 5        

   



     MG   MG                                      MG             

 

     hydroCHLORO hydroCHLORO           No             1{table QD   hydroCHLOR   

        



     thiazide 25 thiazide 25                          t_in_th      Othiazide    

       



     MG   MG                            e_morni      25 MG           



                                        ng}                      

 

     Accupril 40 Accupril 40           No             1{table QD   Accupril     

      



     MG   MG                            t}        40 MG           

 

     amLODIPine amLODIPine           No             1{table      amLODIPine     

      



     Besylate 5 Besylate 5                          t}        Besylate 5        

   



     MG   MG                                      MG             

 

     Zithromax Zithromax           No                  QD   Zithromax           



     Z-Bartolome 250 Z-Bartolome 250                                    Z-Bartolome 250           



     MG   MG                                      MG             

 

     Azithromyci Azithromyci           No                  QD   Azithromyc      

     



     n 250 MG n 250 MG                                    in 250 MG           

 

     Fish Oil Fish Oil           No             1{capsu QD   Fish Oil           



     1000 MG 1000 MG                          le}       1000 MG           

 

     hydroCHLORO hydroCHLORO           No             1{table QD   hydroCHLOR   

        



     thiazide 25 thiazide 25                          t_in_th      Othiazide    

       



     MG   MG                            e_morni      25 MG           



                                        ng}                      

 

     Ondansetron Ondansetron           No                       Ondansetro      

     



     HCl  HCl                                     n HCl           

 

     Benzonatate Benzonatate           No                       Benzonatat      

     



                                                  e              

 

     Metoprolol Metoprolol           No             1{table      Metoprolol     

      



     Succinate Succinate                          t}        Succinate           



      MG  MG                                     MG           

 

     Quinapril Quinapril           No                       Quinapril           



     HCl 40 MG HCl 40 MG                                    HCl 40 MG           

 

     Flonase 50 Flonase 50           No             1{spray QD   Flonase 50     

      



     MCG/ACT MCG/ACT                          _in_eac      MCG/ACT           



                                        h_nostr                     



                                        il}                      

 

     Benzonatate Benzonatate           No                       Benzonatat      

     



     100  MG                                    e 100 MG           

 

     Albuterol Albuterol           No             2{puff_ 6xD  Albuterol        

   



     Sulfate HFA Sulfate HFA                          as_need      Sulfate      

     



     108 (90 108 (90                          ed}                  



     Base) Base)                                    (90 Base)           



     MCG/ACT MCG/ACT                                    MCG/ACT           

 

     Benzonatate Benzonatate           No             1{capsu TID  Benzonatat   

        



     200  MG                          le}       e 200 MG           

 

     Quinapril Quinapril           No                       Quinapril           



     HCl 40 MG HCl 40 MG                                    HCl 40 MG           

 

     Flonase 50 Flonase 50           No             1{spray QD   Flonase 50     

      



     MCG/ACT MCG/ACT                          _in_eac      MCG/ACT           



                                        h_nostr                     



                                        il}                      

 

     Metoprolol Metoprolol           No             1{table      Metoprolol     

      



     Succinate Succinate                          t}        Succinate           



      MG  MG                                     MG           

 

     Ondansetron Ondansetron           No                       Ondansetro      

     



     HCl  HCl                                     n HCl           

 

     predniSONE predniSONE           No                       predniSONE        

   

 

     amLODIPine amLODIPine           No                       amLODIPine        

   



     Besylate 5 Besylate 5                                    Besylate 5        

   



     MG   MG                                      MG             

 

     Boostrix Boostrix           No                       Boostrix           

 

     Dicyclomine Dicyclomine           No                       Dicyclomin      

     



     HCl  HCl                                     e HCl           

 

     Metoprolol Metoprolol           No                       Metoprolol        

   



     Succinate Succinate                                    Succinate           



      MG  MG                                     MG           

 

     Potassium Potassium           No                       Potassium           

 

     Accupril 40 Accupril 40           No             1{table QD   Accupril     

      



     MG   MG                            t}        40 MG           

 

     Magnesium Magnesium           No                       Magnesium           

 

     Quinapril Quinapril           No                       Quinapril           



     HCl  HCl                                     HCl            

 

     amLODIPine amLODIPine           No             1{table      amLODIPine     

      



     Besylate 5 Besylate 5                          t}        Besylate 5        

   



     MG   MG                                      MG             

 

     Ondansetron Ondansetron           No                       Ondansetro      

     



     HCl  HCl                                     n HCl           

 

     hydroCHLORO hydroCHLORO           No             1{table QD   hydroCHLOR   

        



     thiazide 25 thiazide 25                          t_in_th      Othiazide    

       



     MG   MG                            e_morni      25 MG           



                                        ng}                      

 

     Metoprolol Metoprolol           No             1{table      Metoprolol     

      



     Succinate Succinate                          t}        Succinate           



      MG  MG                                     MG           

 

     Zithromax Zithromax           No                  QD   Zithromax           



     Z-Bartolome 250 Z-Bartolome 250                                    Z-Bartolome 250           



     MG   MG                                      MG             

 

     Boostrix Boostrix           No                       Boostrix           

 

     Azithromyci Azithromyci           No                  QD   Azithromyc      

     



     n 250 MG n 250 MG                                    in 250 MG           

 

     Fish Oil Fish Oil           No             1{capsu QD   Fish Oil           



     1000 MG 1000 MG                          le}       1000 MG           

 

     Benzonatate Benzonatate           No                       Benzonatat      

     



                                                  e              

 

     hydroCHLORO hydroCHLORO           No             1{table QD   hydroCHLOR   

        



     thiazide 25 thiazide 25                          t_in_th      Othiazide    

       



     MG   MG                            e_morni      25 MG           



                                        ng}                      

 

     Dicyclomine Dicyclomine           No                       Dicyclomin      

     



     HCl  HCl                                     e HCl           

 

     Flonase 50 Flonase 50           No             1{spray QD   Flonase 50     

      



     MCG/ACT MCG/ACT                          _in_eac      MCG/ACT           



                                        h_nostr                     



                                        il}                      

 

     Benzonatate Benzonatate           No                       Benzonatat      

     



                                                  e              

 

     Benzonatate Benzonatate           No                       Benzonatat      

     



     100  MG                                    e 100 MG           

 

     Albuterol Albuterol           No             2{puff_ 6xD  Albuterol        

   



     Sulfate HFA Sulfate HFA                          as_need      Sulfate      

     



     108 (90 108 (90                          ed}                  



     Base) Base)                                    (90 Base)           



     MCG/ACT MCG/ACT                                    MCG/ACT           

 

     Benzonatate Benzonatate           No             1{capsu TID  Benzonatat   

        



     200  MG                          le}       e 200 MG           

 

     Quinapril Quinapril           No                       Quinapril           



     HCl 40 MG HCl 40 MG                                    HCl 40 MG           

 

     Fish Oil Fish Oil           No             1{capsu QD   Fish Oil           



     1000 MG 1000 MG                          le}       1000 MG           

 

     Quinapril Quinapril           No                       Quinapril           



     HCl  HCl                                     HCl            

 

     hydroCHLORO hydroCHLORO           No             1{table QD   hydroCHLOR   

        



     thiazide 25 thiazide 25                          t_in      Othiazide    

       



     MG   MG                            e_morni      25 MG           



                                        ng}                      

 

     predniSONE predniSONE           No                       predniSONE        

   

 

     amLODIPine amLODIPine           No                       amLODIPine        

   



     Besylate 5 Besylate 5                                    Besylate 5        

   



     MG   MG                                      MG             

 

     Boostrix Boostrix           No                       Boostrix           

 

     Metoprolol Metoprolol           No                       Metoprolol        

   



     Succinate Succinate                                    Succinate           



      MG  MG                                     MG           

 

     Potassium Potassium           No                       Potassium           

 

     Accupril 40 Accupril 40           No             1{table QD   Accupril     

      



     MG   MG                            t}        40 MG           

 

     Benzonatate Benzonatate           No                       Benzonatat      

     



     100  MG                                    e 100 MG           

 

     Magnesium Magnesium           No                       Magnesium           

 

     Quinapril Quinapril           No                       Quinapril           



     HCl  HCl                                     HCl            

 

     amLODIPine amLODIPine           No             1{table      amLODIPine     

      



     Besylate 5 Besylate 5                          t}        Besylate 5        

   



     MG   MG                                      MG             

 

     Ondansetron Ondansetron           No                       Ondansetro      

     



     HCl  HCl                                     n HCl           

 

     hydroCHLORO hydroCHLORO           No             1{table QD   hydroCHLOR   

        



     thiazide 25 thiazide 25                          t_in_th      Othiazide    

       



     MG   MG                            e_morni      25 MG           



                                        ng}                      

 

     Metoprolol Metoprolol           No             1{table      Metoprolol     

      



     Succinate Succinate                          t}        Succinate           



      MG  MG                                     MG           

 

     Zithromax Zithromax           No                  QD   Zithromax           



     Z-Bartolome 250 Z-Bartolome 250                                    Z-Bartolome 250           



     MG   MG                                      MG             

 

     Azithromyci Azithromyci           No                  QD   Azithromyc      

     



     n 250 MG n 250 MG                                    in 250 MG           

 

     Fish Oil Fish Oil           No             1{capsu QD   Fish Oil           



     1000 MG 1000 MG                          le}       1000 MG           

 

     hydroCHLORO hydroCHLORO           No             1{table QD   hydroCHLOR   

        



     thiazide 25 thiazide 25                          t_in_th      Othiazide    

       



     MG   MG                            e_morni      25 MG           



                                        ng}                      

 

     Benzonatate Benzonatate           No                       Benzonatat      

     



                                                  e              

 

     hydroCHLORO hydroCHLORO           No             1{table QD   hydroCHLOR   

        



     thiazide 25 thiazide 25                          t_in_th      Othiazide    

       



     MG   MG                            e_morni      25 MG           



                                        ng}                      

 

     Dicyclomine Dicyclomine           No                       Dicyclomin      

     



     HCl  HCl                                     e HCl           

 

     Flonase 50 Flonase 50           No             1{spray QD   Flonase 50     

      



     MCG/ACT MCG/ACT                          _in_eac      MCG/ACT           



                                        h_nostr                     



                                        il}                      

 

     Benzonatate Benzonatate           No                       Benzonatat      

     



     100  MG                                    e 100 MG           

 

     Albuterol Albuterol           No             2{puff_ 6xD  Albuterol        

   



     Sulfate HFA Sulfate HFA                          as_need      Sulfate      

     



     108 (90 108 (90                          ed}                  



     Base) Base)                                    (90 Base)           



     MCG/ACT MCG/ACT                                    MCG/ACT           

 

     Benzonatate Benzonatate           No             1{capsu TID  Benzonatat   

        



     200  MG                          le}       e 200 MG           

 

     predniSONE predniSONE           No                       predniSONE        

   

 

     Quinapril Quinapril           No                       Quinapril           



     HCl 40 MG HCl 40 MG                                    HCl 40 MG           

 

     amLODIPine amLODIPine           No             1{table      amLODIPine     

      



     Besylate 5 Besylate 5                          t}        Besylate 5        

   



     MG   MG                                      MG             

 

     Accupril 40 Accupril 40           No             1{table QD   Accupril     

      



     MG   MG                            t}        40 MG           

 

     Quinapril Quinapril           No                       Quinapril           



     HCl 40 MG HCl 40 MG                                    HCl 40 MG           

 

     Flonase 50 Flonase 50           No             1{spray QD   Flonase 50     

      



     MCG/ACT MCG/ACT                          _in_eac      MCG/ACT           



                                        h_nostr                     



                                        il}                      

 

     Metoprolol Metoprolol           No             1{table      Metoprolol     

      



     Succinate Succinate                          t}        Succinate           



      MG  MG                                     MG           

 

     Ondansetron Ondansetron           No                       Ondansetro      

     



     HCl  HCl                                     n HCl           

 

     Dicyclomine Dicyclomine           No                       Dicyclomin      

     



     HCl  HCl                                     e HCl           

 

     Boostrix Boostrix           No                       Boostrix           

 

     Benzonatate Benzonatate           No                       Benzonatat      

     



                                                  e              

 

     Fish Oil Fish Oil           No             1{capsu QD   Fish Oil           



     1000 MG 1000 MG                          le}       1000 MG           

 

     Quinapril Quinapril           No                       Quinapril           



     HCl  HCl                                     HCl            

 

     hydroCHLORO hydroCHLORO           No             1{table QD   hydroCHLOR   

        



     thiazide 25 thiazide 25                          t_in_th      Othiazide    

       



     MG   MG                            e_morni      25 MG           



                                        ng}                      

 

     Benzonatate Benzonatate           No                       Benzonatat      

     



     100  MG                                    e 100 MG           

 

     hydroCHLORO hydroCHLORO           No             1{table QD   hydroCHLOR   

        



     thiazide 25 thiazide 25                          t_in_th      Othiazide    

       



     MG   MG                            e_morni      25 MG           



                                        ng}                      

 

     predniSONE predniSONE           No                       predniSONE        

   

 

     amLODIPine amLODIPine           No             1{table      amLODIPine     

      



     Besylate 5 Besylate 5                          t}        Besylate 5        

   



     MG   MG                                      MG             

 

     Accupril 40 Accupril 40           No             1{table QD   Accupril     

      



     MG   MG                            t}        40 MG           

 

     predniSONE predniSONE           No                       predniSONE        

   

 

     Boostrix Boostrix           No                       Boostrix           

 

     Benzonatate Benzonatate           No                       Benzonatat      

     



     100  MG                                    e 100 MG           

 

     Accupril 40 Accupril 40           No             1{table QD   Accupril     

      



     MG   MG                            t}        40 MG           

 

     hydroCHLORO hydroCHLORO           No             1{table QD   hydroCHLOR   

        



     thiazide 25 thiazide 25                          t_in_th      Othiazide    

       



     MG   MG                            e_morni      25 MG           



                                        ng}                      

 

     Quinapril Quinapril           No                       Quinapril           



     HCl 40 MG HCl 40 MG                                    HCl 40 MG           

 

     Benzonatate Benzonatate           No                       Benzonatat      

     



                                                  e              

 

     hydroCHLORO hydroCHLORO           No             1{table QD   hydroCHLOR   

        



     thiazide 25 thiazide 25                          t_in_th      Othiazide    

       



     MG   MG                            e_morni      25 MG           



                                        ng}                      

 

     amLODIPine amLODIPine           No             1{table      amLODIPine     

      



     Besylate 5 Besylate 5                          t}        Besylate 5        

   



     MG   MG                                      MG             

 

     Fish Oil Fish Oil           No             1{capsu QD   Fish Oil           



     1000 MG 1000 MG                          le}       1000 MG           

 

     Ondansetron Ondansetron           No                       Ondansetro      

     



     HCl  HCl                                     n HCl           

 

     Dicyclomine Dicyclomine           No                       Dicyclomin      

     



     HCl  HCl                                     e HCl           

 

     Metoprolol Metoprolol           No             1{table      Metoprolol     

      



     Succinate Succinate                          t}        Succinate           



      MG  MG                                     MG           

 

     Quinapril Quinapril           No                       Quinapril           



     HCl  HCl                                     HCl            

 

     Flonase 50 Flonase 50           No             1{spray QD   Flonase 50     

      



     MCG/ACT MCG/ACT                          _in_eac      MCG/ACT           



                                        h_nostr                     



                                        il}                      

 

     predniSONE predniSONE           No                       predniSONE        

   

 

     Boostrix Boostrix           No                       Boostrix           

 

     Benzonatate Benzonatate           No                       Benzonatat      

     



     100  MG                                    e 100 MG           

 

     Accupril 40 Accupril 40           No             1{table QD   Accupril     

      



     MG   MG                            t}        40 MG           

 

     hydroCHLORO hydroCHLORO           No             1{table QD   hydroCHLOR   

        



     thiazide 25 thiazide 25                          t_in_th      Othiazide    

       



     MG   MG                            e_morni      25 MG           



                                        ng}                      

 

     Quinapril Quinapril           No                       Quinapril           



     HCl 40 MG HCl 40 MG                                    HCl 40 MG           

 

     Benzonatate Benzonatate           No                       Benzonatat      

     



                                                  e              

 

     hydroCHLORO hydroCHLORO           No             1{table QD   hydroCHLOR   

        



     thiazide 25 thiazide 25                          t_in_th      Othiazide    

       



     MG   MG                            e_morni      25 MG           



                                        ng}                      

 

     amLODIPine amLODIPine           No             1{table      amLODIPine     

      



     Besylate 5 Besylate 5                          t}        Besylate 5        

   



     MG   MG                                      MG             

 

     Fish Oil Fish Oil           No             1{capsu QD   Fish Oil           



     1000 MG 1000 MG                          le}       1000 MG           

 

     Ondansetron Ondansetron           No                       Ondansetro      

     



     HCl  HCl                                     n HCl           

 

     Dicyclomine Dicyclomine           No                       Dicyclomin      

     



     HCl  HCl                                     e HCl           

 

     Metoprolol Metoprolol           No             1{table      Metoprolol     

      



     Succinate Succinate                          t}        Succinate           



      MG  MG                                     MG           

 

     Quinapril Quinapril           No                       Quinapril           



     HCl  HCl                                     HCl            

 

     Flonase 50 Flonase 50           No             1{spray QD   Flonase 50     

      



     MCG/ACT MCG/ACT                          _in_eac      MCG/ACT           



                                        h_nostr                     



                                        il}                      

 

     predniSONE predniSONE           No                       predniSONE        

   

 

     Boostrix Boostrix           No                       Boostrix           

 

     Benzonatate Benzonatate           No                       Benzonatat      

     



     100  MG                                    e 100 MG           

 

     Accupril 40 Accupril 40           No             1{table QD   Accupril     

      



     MG   MG                            t}        40 MG           

 

     hydroCHLORO hydroCHLORO           No             1{table QD   hydroCHLOR   

        



     thiazide 25 thiazide 25                          t_in_th      Othiazide    

       



     MG   MG                            e_morni      25 MG           



                                        ng}                      

 

     Quinapril Quinapril           No                       Quinapril           



     HCl 40 MG HCl 40 MG                                    HCl 40 MG           

 

     Benzonatate Benzonatate           No                       Benzonatat      

     



                                                  e              

 

     hydroCHLORO hydroCHLORO           No             1{table QD   hydroCHLOR   

        



     thiazide 25 thiazide 25                          t_in_th      Othiazide    

       



     MG   MG                            e_morni      25 MG           



                                        ng}                      

 

     amLODIPine amLODIPine           No             1{table      amLODIPine     

      



     Besylate 5 Besylate 5                          t}        Besylate 5        

   



     MG   MG                                      MG             

 

     Fish Oil Fish Oil           No             1{capsu QD   Fish Oil           



     1000 MG 1000 MG                          le}       1000 MG           

 

     Ondansetron Ondansetron           No                       Ondansetro      

     



     HCl  HCl                                     n HCl           

 

     Dicyclomine Dicyclomine           No                       Dicyclomin      

     



     HCl  HCl                                     e HCl           

 

     Metoprolol Metoprolol           No             1{table      Metoprolol     

      



     Succinate Succinate                          t}        Succinate           



      MG  MG                                     MG           

 

     Quinapril Quinapril           No                       Quinapril           



     HCl  HCl                                     HCl            

 

     Flonase 50 Flonase 50           No             1{spray QD   Flonase 50     

      



     MCG/ACT MCG/ACT                          _in_eac      MCG/ACT           



                                        h_nostr                     



                                        il}                      

 

     Ondansetron Ondansetron           No                       Ondansetro      

     



     HCl  HCl                                     n HCl           

 

     Dicyclomine Dicyclomine           No                       Dicyclomin      

     



     HCl  HCl                                     e HCl           

 

     Metoprolol Metoprolol           No             1{table      Metoprolol     

      



     Succinate Succinate                          t}        Succinate           



      MG  MG                                     MG           

 

     Flonase 50 Flonase 50           No             1{spray QD   Flonase 50     

      



     MCG/ACT MCG/ACT                          _in_eac      MCG/ACT           



                                        h_nostr                     



                                        il}                      

 

     predniSONE predniSONE           No                       predniSONE        

   

 

     Fish Oil Fish Oil           No             1{capsu QD   Fish Oil           



     1000 MG 1000 MG                          le}       1000 MG           

 

     Boostrix Boostrix           No                       Boostrix           

 

     Accupril 40 Accupril 40           No             1{table QD   Accupril     

      



     MG   MG                            t}        40 MG           

 

     Quinapril Quinapril           No                       Quinapril           



     HCl 40 MG HCl 40 MG                                    HCl 40 MG           

 

     Benzonatate Benzonatate           No                       Benzonatat      

     



     100  MG                                    e 100 MG           

 

     hydroCHLORO hydroCHLORO           No             1{table QD   hydroCHLOR   

        



     thiazide 25 thiazide 25                          t_in_th      Othiazide    

       



     MG   MG                            e_morni      25 MG           



                                        ng}                      

 

     Quinapril Quinapril           No                       Quinapril           



     HCl  HCl                                     HCl            

 

     hydroCHLORO hydroCHLORO           No             1{table QD   hydroCHLOR   

        



     thiazide 25 thiazide 25                          t_in_th      Othiazide    

       



     MG   MG                            e_morni      25 MG           



                                        ng}                      

 

     Zithromax Zithromax           No                  QD   Zithromax           



     Z-Bartolome 250 Z-Bartolome 250                                    Z-Bartolome 250           



     MG   MG                                      MG             

 

     amLODIPine amLODIPine           No             1{table      amLODIPine     

      



     Besylate 5 Besylate 5                          t}        Besylate 5        

   



     MG   MG                                      MG             

 

     Benzonatate Benzonatate           No                       Benzonatat      

     



                                                  e              

 

     Ondansetron Ondansetron           No                       Ondansetro      

     



     HCl  HCl                                     n HCl           

 

     Dicyclomine Dicyclomine           No                       Dicyclomin      

     



     HCl  HCl                                     e HCl           

 

     Metoprolol Metoprolol           No             1{table      Metoprolol     

      



     Succinate Succinate                          t}        Succinate           



      MG  MG                                     MG           

 

     Flonase 50 Flonase 50           No             1{spray QD   Flonase 50     

      



     MCG/ACT MCG/ACT                          _in_eac      MCG/ACT           



                                        h_nostr                     



                                        il}                      

 

     predniSONE predniSONE           No                       predniSONE        

   

 

     Fish Oil Fish Oil           No             1{capsu QD   Fish Oil           



     1000 MG 1000 MG                          le}       1000 MG           

 

     Boostrix Boostrix           No                       Boostrix           

 

     Accupril 40 Accupril 40           No             1{table QD   Accupril     

      



     MG   MG                            t}        40 MG           

 

     Quinapril Quinapril           No                       Quinapril           



     HCl 40 MG HCl 40 MG                                    HCl 40 MG           

 

     Benzonatate Benzonatate           No                       Benzonatat      

     



     100  MG                                    e 100 MG           

 

     hydroCHLORO hydroCHLORO           No             1{table QD   hydroCHLOR   

        



     thiazide 25 thiazide 25                          t_in_th      Othiazide    

       



     MG   MG                            e_morni      25 MG           



                                        ng}                      

 

     Quinapril Quinapril           No                       Quinapril           



     HCl  HCl                                     HCl            

 

     hydroCHLORO hydroCHLORO           No             1{table QD   hydroCHLOR   

        



     thiazide 25 thiazide 25                          t_in_th      Othiazide    

       



     MG   MG                            e_morni      25 MG           



                                        ng}                      

 

     Zithromax Zithromax           No                  QD   Zithromax           



     Z-Bartolome 250 Z-Bartolome 250                                    Z-Bartolome 250           



     MG   MG                                      MG             

 

     amLODIPine amLODIPine           No             1{table      amLODIPine     

      



     Besylate 5 Besylate 5                          t}        Besylate 5        

   



     MG   MG                                      MG             

 

     Benzonatate Benzonatate           No                       Benzonatat      

     



                                                  e              

 

     Ondansetron Ondansetron           No                       Ondansetro      

     



     HCl  HCl                                     n HCl           

 

     Dicyclomine Dicyclomine           No                       Dicyclomin      

     



     HCl  HCl                                     e HCl           

 

     Metoprolol Metoprolol           No             1{table      Metoprolol     

      



     Succinate Succinate                          t}        Succinate           



      MG  MG                                     MG           

 

     Flonase 50 Flonase 50           No             1{spray QD   Flonase 50     

      



     MCG/ACT MCG/ACT                          _in_eac      MCG/ACT           



                                        h_nostr                     



                                        il}                      

 

     predniSONE predniSONE           No                       predniSONE        

   

 

     Fish Oil Fish Oil           No             1{capsu QD   Fish Oil           



     1000 MG 1000 MG                          le}       1000 MG           

 

     Boostrix Boostrix           No                       Boostrix           

 

     Accupril 40 Accupril 40           No             1{table QD   Accupril     

      



     MG   MG                            t}        40 MG           

 

     Quinapril Quinapril           No                       Quinapril           



     HCl 40 MG HCl 40 MG                                    HCl 40 MG           

 

     Benzonatate Benzonatate           No                       Benzonatat      

     



     100  MG                                    e 100 MG           

 

     hydroCHLORO hydroCHLORO           No             1{table QD   hydroCHLOR   

        



     thiazide 25 thiazide 25                          t_in_th      Othiazide    

       



     MG   MG                            e_morni      25 MG           



                                        ng}                      

 

     Quinapril Quinapril           No                       Quinapril           



     HCl  HCl                                     HCl            

 

     hydroCHLORO hydroCHLORO           No             1{table QD   hydroCHLOR   

        



     thiazide 25 thiazide 25                          t_in_th      Othiazide    

       



     MG   MG                            e_morni      25 MG           



                                        ng}                      

 

     Zithromax Zithromax           No                  QD   Zithromax           



     Z-Bartolome 250 Z-Bartolome 250                                    Z-Bartolome 250           



     MG   MG                                      MG             

 

     amLODIPine amLODIPine           No             1{table      amLODIPine     

      



     Besylate 5 Besylate 5                          t}        Besylate 5        

   



     MG   MG                                      MG             

 

     Benzonatate Benzonatate           No                       Benzonatat      

     



                                                  e              

 

     Ondansetron Ondansetron           No                       Ondansetro      

     



     HCl  HCl                                     n HCl           

 

     Dicyclomine Dicyclomine           No                       Dicyclomin      

     



     HCl  HCl                                     e HCl           

 

     Metoprolol Metoprolol           No             1{table      Metoprolol     

      



     Succinate Succinate                          t}        Succinate           



      MG  MG                                     MG           

 

     Flonase 50 Flonase 50           No             1{spray QD   Flonase 50     

      



     MCG/ACT MCG/ACT                          _in_eac      MCG/ACT           



                                        h_nostr                     



                                        il}                      

 

     predniSONE predniSONE           No                       predniSONE        

   

 

     Fish Oil Fish Oil           No             1{capsu QD   Fish Oil           



     1000 MG 1000 MG                          le}       1000 MG           

 

     Boostrix Boostrix           No                       Boostrix           

 

     Accupril 40 Accupril 40           No             1{table QD   Accupril     

      



     MG   MG                            t}        40 MG           

 

     Quinapril Quinapril           No                       Quinapril           



     HCl 40 MG HCl 40 MG                                    HCl 40 MG           

 

     Benzonatate Benzonatate           No                       Benzonatat      

     



     100  MG                                    e 100 MG           

 

     hydroCHLORO hydroCHLORO           No             1{table QD   hydroCHLOR   

        



     thiazide 25 thiazide 25                          t_in_th      Othiazide    

       



     MG   MG                            e_morni      25 MG           



                                        ng}                      

 

     Quinapril Quinapril           No                       Quinapril           



     HCl  HCl                                     HCl            

 

     hydroCHLORO hydroCHLORO           No             1{table QD   hydroCHLOR   

        



     thiazide 25 thiazide 25                          t_in_th      Othiazide    

       



     MG   MG                            e_morni      25 MG           



                                        ng}                      

 

     Zithromax Zithromax           No                  QD   Zithromax           



     Z-Bartolome 250 Z-Bartolome 250                                    Z-Bartolome 250           



     MG   MG                                      MG             

 

     amLODIPine amLODIPine           No             1{table      amLODIPine     

      



     Besylate 5 Besylate 5                          t}        Besylate 5        

   



     MG   MG                                      MG             

 

     Benzonatate Benzonatate           No                       Benzonatat      

     



                                                  e              

 

     Boostrix Boostrix           No                       Boostrix           

 

     Ondansetron Ondansetron           No                       Ondansetro      

     



     HCl  HCl                                     n HCl           

 

     hydroCHLORO hydroCHLORO           No             1{table QD   hydroCHLOR   

        



     thiazide 25 thiazide 25                          t_in_th      Othiazide    

       



     MG   MG                            e_morni      25 MG           



                                        ng}                      

 

     Metoprolol Metoprolol           No             1{table      Metoprolol     

      



     Succinate Succinate                          t}        Succinate           



      MG  MG                                     MG           

 

     Fish Oil Fish Oil           No             1{capsu QD   Fish Oil           



     1000 MG 1000 MG                          le}       1000 MG           

 

     Flonase 50 Flonase 50           No             1{spray QD   Flonase 50     

      



     MCG/ACT MCG/ACT                          _in_eac      MCG/ACT           



                                        h_nostr                     



                                        il}                      

 

     Zithromax Zithromax           No                  QD   Zithromax           



     Z-Bartolome 250 Z-Bartolome 250                                    Z-Bartolome 250           



     MG   MG                                      MG             

 

     amLODIPine amLODIPine           No             1{table      amLODIPine     

      



     Besylate 5 Besylate 5                          t}        Besylate 5        

   



     MG   MG                                      MG             

 

     Quinapril Quinapril           No                       Quinapril           



     HCl  HCl                                     HCl            

 

     Benzonatate Benzonatate           No                       Benzonatat      

     



                                                  e              

 

     predniSONE predniSONE           No                       predniSONE        

   

 

     Dicyclomine Dicyclomine           No                       Dicyclomin      

     



     HCl  HCl                                     e HCl           

 

     Quinapril Quinapril           No                       Quinapril           



     HCl 40 MG HCl 40 MG                                    HCl 40 MG           

 

     Benzonatate Benzonatate           No                       Benzonatat      

     



     100  MG                                    e 100 MG           

 

     Accupril 40 Accupril 40           No             1{table QD   Accupril     

      



     MG   MG                            t}        40 MG           

 

     hydroCHLORO hydroCHLORO           No             1{table QD   hydroCHLOR   

        



     thiazide 25 thiazide 25                          t_in_th      Othiazide    

       



     MG   MG                            e_morni      25 MG           



                                        ng}                      

 

     Boostrix Boostrix           No                       Boostrix           

 

     Ondansetron Ondansetron           No                       Ondansetro      

     



     HCl  HCl                                     n HCl           

 

     hydroCHLORO hydroCHLORO           No             1{table QD   hydroCHLOR   

        



     thiazide 25 thiazide 25                          t_in_th      Othiazide    

       



     MG   MG                            e_morni      25 MG           



                                        ng}                      

 

     Metoprolol Metoprolol           No             1{table      Metoprolol     

      



     Succinate Succinate                          t}        Succinate           



      MG  MG                                     MG           

 

     Fish Oil Fish Oil           No             1{capsu QD   Fish Oil           



     1000 MG 1000 MG                          le}       1000 MG           

 

     Flonase 50 Flonase 50           No             1{spray QD   Flonase 50     

      



     MCG/ACT MCG/ACT                          _in_eac      MCG/ACT           



                                        h_nostr                     



                                        il}                      

 

     Zithromax Zithromax           No                  QD   Zithromax           



     Z-Bartolome 250 Z-Bartolome 250                                    Z-Bartolome 250           



     MG   MG                                      MG             

 

     amLODIPine amLODIPine           No             1{table      amLODIPine     

      



     Besylate 5 Besylate 5                          t}        Besylate 5        

   



     MG   MG                                      MG             

 

     Quinapril Quinapril           No                       Quinapril           



     HCl  HCl                                     HCl            

 

     Benzonatate Benzonatate           No                       Benzonatat      

     



                                                  e              

 

     predniSONE predniSONE           No                       predniSONE        

   

 

     Dicyclomine Dicyclomine           No                       Dicyclomin      

     



     HCl  HCl                                     e HCl           

 

     Quinapril Quinapril           No                       Quinapril           



     HCl 40 MG HCl 40 MG                                    HCl 40 MG           

 

     Benzonatate Benzonatate           No                       Benzonatat      

     



     100  MG                                    e 100 MG           

 

     Accupril 40 Accupril 40           No             1{table QD   Accupril     

      



     MG   MG                            t}        40 MG           

 

     hydroCHLORO hydroCHLORO           No             1{table QD   hydroCHLOR   

        



     thiazide 25 thiazide 25                          t_in_th      Othiazide    

       



     MG   MG                            e_morni      25 MG           



                                        ng}                      

 

     Boostrix Boostrix           No                       Boostrix           

 

     Ondansetron Ondansetron           No                       Ondansetro      

     



     HCl  HCl                                     n HCl           

 

     hydroCHLORO hydroCHLORO           No             1{table QD   hydroCHLOR   

        



     thiazide 25 thiazide 25                          t_in_th      Othiazide    

       



     MG   MG                            e_morni      25 MG           



                                        ng}                      

 

     Metoprolol Metoprolol           No             1{table      Metoprolol     

      



     Succinate Succinate                          t}        Succinate           



      MG  MG                                     MG           

 

     Fish Oil Fish Oil           No             1{capsu QD   Fish Oil           



     1000 MG 1000 MG                          le}       1000 MG           

 

     Flonase 50 Flonase 50           No             1{spray QD   Flonase 50     

      



     MCG/ACT MCG/ACT                          _in_eac      MCG/ACT           



                                        h_nostr                     



                                        il}                      

 

     Zithromax Zithromax           No                  QD   Zithromax           



     Z-Bartolome 250 Z-Bartolome 250                                    Z-Bartolome 250           



     MG   MG                                      MG             

 

     amLODIPine amLODIPine           No             1{table      amLODIPine     

      



     Besylate 5 Besylate 5                          t}        Besylate 5        

   



     MG   MG                                      MG             

 

     Quinapril Quinapril           No                       Quinapril           



     HCl  HCl                                     HCl            

 

     Benzonatate Benzonatate           No                       Benzonatat      

     



                                                  e              

 

     predniSONE predniSONE           No                       predniSONE        

   

 

     Dicyclomine Dicyclomine           No                       Dicyclomin      

     



     HCl  HCl                                     e HCl           

 

     Quinapril Quinapril           No                       Quinapril           



     HCl 40 MG HCl 40 MG                                    HCl 40 MG           

 

     Benzonatate Benzonatate           No                       Benzonatat      

     



     100  MG                                    e 100 MG           

 

     Accupril 40 Accupril 40           No             1{table QD   Accupril     

      



     MG   MG                            t}        40 MG           

 

     hydroCHLORO hydroCHLORO           No             1{table QD   hydroCHLOR   

        



     thiazide 25 thiazide 25                          t_in_th      Othiazide    

       



     MG   MG                            e_morni      25 MG           



                                        ng}                      

 

     hydroCHLORO hydroCHLORO           No             1{table QD   hydroCHLOR   

        



     thiazide 25 thiazide 25                          t_in_th      Othiazide    

       



     MG   MG                            e_morni      25 MG           



                                        ng}                      

 

     Benzonatate Benzonatate           No                       Benzonatat      

     



     100  MG                                    e 100 MG           

 

     Benzonatate Benzonatate           No                       Benzonatat      

     



                                                  e              

 

     Ondansetron Ondansetron           No                       Ondansetro      

     



     HCl  HCl                                     n HCl           

 

     Metoprolol Metoprolol           No             1{table      Metoprolol     

      



     Succinate Succinate                          t}        Succinate           



      MG  MG                                     MG           

 

     predniSONE predniSONE           No                       predniSONE        

   

 

     hydroCHLORO hydroCHLORO           No             1{table QD   hydroCHLOR   

        



     thiazide 25 thiazide 25                          t_in_th      Othiazide    

       



     MG   MG                            e_morni      25 MG           



                                        ng}                      

 

     Dicyclomine Dicyclomine           No                       Dicyclomin      

     



     HCl  HCl                                     e HCl           

 

     Quinapril Quinapril           No                       Quinapril           



     HCl 40 MG HCl 40 MG                                    HCl 40 MG           

 

     Boostrix Boostrix           No                       Boostrix           

 

     Quinapril Quinapril           No                       Quinapril           



     HCl  HCl                                     HCl            

 

     Zithromax Zithromax           No                  QD   Zithromax           



     Z-Bartolome 250 Z-Bartolome 250                                    Z-Bartolome 250           



     MG   MG                                      MG             

 

     Fish Oil Fish Oil           No             1{capsu QD   Fish Oil           



     1000 MG 1000 MG                          le}       1000 MG           

 

     amLODIPine amLODIPine           No             1{table      amLODIPine     

      



     Besylate 5 Besylate 5                          t}        Besylate 5        

   



     MG   MG                                      MG             

 

     Accupril 40 Accupril 40           No             1{table QD   Accupril     

      



     MG   MG                            t}        40 MG           

 

     Flonase 50 Flonase 50           No             1{spray QD   Flonase 50     

      



     MCG/ACT MCG/ACT                          _in_eac      MCG/ACT           



                                        h_nostr                     



                                        il}                      

 

     hydroCHLORO hydroCHLORO           No             1{table QD   hydroCHLOR   

        



     thiazide 25 thiazide 25                          t_in_th      Othiazide    

       



     MG   MG                            e_morni      25 MG           



                                        ng}                      

 

     Benzonatate Benzonatate           No                       Benzonatat      

     



     100  MG                                    e 100 MG           

 

     Benzonatate Benzonatate           No                       Benzonatat      

     



                                                  e              

 

     Ondansetron Ondansetron           No                       Ondansetro      

     



     HCl  HCl                                     n HCl           

 

     Metoprolol Metoprolol           No             1{table      Metoprolol     

      



     Succinate Succinate                          t}        Succinate           



      MG  MG                                     MG           

 

     predniSONE predniSONE           No                       predniSONE        

   

 

     hydroCHLORO hydroCHLORO           No             1{table QD   hydroCHLOR   

        



     thiazide 25 thiazide 25                          t_in_th      Othiazide    

       



     MG   MG                            e_morni      25 MG           



                                        ng}                      

 

     Dicyclomine Dicyclomine           No                       Dicyclomin      

     



     HCl  HCl                                     e HCl           

 

     Quinapril Quinapril           No                       Quinapril           



     HCl 40 MG HCl 40 MG                                    HCl 40 MG           

 

     Boostrix Boostrix           No                       Boostrix           

 

     Quinapril Quinapril           No                       Quinapril           



     HCl  HCl                                     HCl            

 

     Zithromax Zithromax           No                  QD   Zithromax           



     Z-Bartolome 250 Z-Bartolome 250                                    Z-Bartolome 250           



     MG   MG                                      MG             

 

     Fish Oil Fish Oil           No             1{capsu QD   Fish Oil           



     1000 MG 1000 MG                          le}       1000 MG           

 

     amLODIPine amLODIPine           No             1{table      amLODIPine     

      



     Besylate 5 Besylate 5                          t}        Besylate 5        

   



     MG   MG                                      MG             

 

     Accupril 40 Accupril 40           No             1{table QD   Accupril     

      



     MG   MG                            t}        40 MG           

 

     Flonase 50 Flonase 50           No             1{spray QD   Flonase 50     

      



     MCG/ACT MCG/ACT                          _in_eac      MCG/ACT           



                                        h_nostr                     



                                        il}                      

 

     hydroCHLORO hydroCHLORO           No             1{table QD   hydroCHLOR   

        



     thiazide 25 thiazide 25                          t_in_th      Othiazide    

       



     MG   MG                            e_morni      25 MG           



                                        ng}                      

 

     Benzonatate Benzonatate           No                       Benzonatat      

     



     100  MG                                    e 100 MG           

 

     Benzonatate Benzonatate           No                       Benzonatat      

     



                                                  e              

 

     Ondansetron Ondansetron           No                       Ondansetro      

     



     HCl  HCl                                     n HCl           

 

     Metoprolol Metoprolol           No             1{table      Metoprolol     

      



     Succinate Succinate                          t}        Succinate           



      MG  MG                                     MG           

 

     predniSONE predniSONE           No                       predniSONE        

   

 

     hydroCHLORO hydroCHLORO           No             1{table QD   hydroCHLOR   

        



     thiazide 25 thiazide 25                          t_in_th      Othiazide    

       



     MG   MG                            e_morni      25 MG           



                                        ng}                      

 

     Dicyclomine Dicyclomine           No                       Dicyclomin      

     



     HCl  HCl                                     e HCl           

 

     Quinapril Quinapril           No                       Quinapril           



     HCl 40 MG HCl 40 MG                                    HCl 40 MG           

 

     Boostrix Boostrix           No                       Boostrix           

 

     Quinapril Quinapril           No                       Quinapril           



     HCl  HCl                                     HCl            

 

     Zithromax Zithromax           No                  QD   Zithromax           



     Z-Bartolome 250 Z-Bartolome 250                                    Z-Bartolome 250           



     MG   MG                                      MG             

 

     Fish Oil Fish Oil           No             1{capsu QD   Fish Oil           



     1000 MG 1000 MG                          le}       1000 MG           

 

     amLODIPine amLODIPine           No             1{table      amLODIPine     

      



     Besylate 5 Besylate 5                          t}        Besylate 5        

   



     MG   MG                                      MG             

 

     Accupril 40 Accupril 40           No             1{table QD   Accupril     

      



     MG   MG                            t}        40 MG           

 

     Flonase 50 Flonase 50           No             1{spray QD   Flonase 50     

      



     MCG/ACT MCG/ACT                          _in_eac      MCG/ACT           



                                        h_nostr                     



                                        il}                      

 

     hydroCHLORO hydroCHLORO           No             1{table QD   hydroCHLOR   

        



     thiazide 25 thiazide 25                          t_in_th      Othiazide    

       



     MG   MG                            e_morni      25 MG           



                                        ng}                      

 

     Benzonatate Benzonatate           No                       Benzonatat      

     



     100  MG                                    e 100 MG           

 

     Benzonatate Benzonatate           No                       Benzonatat      

     



                                                  e              

 

     Ondansetron Ondansetron           No                       Ondansetro      

     



     HCl  HCl                                     n HCl           

 

     Metoprolol Metoprolol           No             1{table      Metoprolol     

      



     Succinate Succinate                          t}        Succinate           



      MG  MG                                     MG           

 

     predniSONE predniSONE           No                       predniSONE        

   

 

     hydroCHLORO hydroCHLORO           No             1{table QD   hydroCHLOR   

        



     thiazide 25 thiazide 25                          t_in      Othiazide    

       



     MG   MG                            e_morni      25 MG           



                                        ng}                      

 

     Dicyclomine Dicyclomine           No                       Dicyclomin      

     



     HCl  HCl                                     e HCl           

 

     Quinapril Quinapril           No                       Quinapril           



     HCl 40 MG HCl 40 MG                                    HCl 40 MG           

 

     Boostrix Boostrix           No                       Boostrix           

 

     Quinapril Quinapril           No                       Quinapril           



     HCl  HCl                                     HCl            

 

     Zithromax Zithromax           No                  QD   Zithromax           



     Z-Bartolome 250 Z-Bartolome 250                                    Z-Bartolome 250           



     MG   MG                                      MG             

 

     Fish Oil Fish Oil           No             1{capsu QD   Fish Oil           



     1000 MG 1000 MG                          le}       1000 MG           

 

     amLODIPine amLODIPine           No             1{table      amLODIPine     

      



     Besylate 5 Besylate 5                          t}        Besylate 5        

   



     MG   MG                                      MG             

 

     Accupril 40 Accupril 40           No             1{table QD   Accupril     

      



     MG   MG                            t}        40 MG           

 

     Flonase 50 Flonase 50           No             1{spray QD   Flonase 50     

      



     MCG/ACT MCG/ACT                          _in_eac      MCG/ACT           



                                        h_nostr                     



                                        il}                      

 

     Benzonatate Benzonatate           No                       Benzonatat      

     



                                                  e              

 

     Quinapril Quinapril           No                       Quinapril           



     HCl 40 MG HCl 40 MG                                    HCl 40 MG           

 

     Dicyclomine Dicyclomine           No                       Dicyclomin      

     



     HCl  HCl                                     e HCl           

 

     Quinapril Quinapril           No                       Quinapril           



     HCl  HCl                                     HCl            

 

     hydroCHLORO hydroCHLORO           No             1{table QD   hydroCHLOR   

        



     thiazide 25 thiazide 25                          t_in_th      Othiazide    

       



     MG   MG                            e_morni      25 MG           



                                        ng}                      

 

     Accupril 40 Accupril 40           No             1{table QD   Accupril     

      



     MG   MG                            t}        40 MG           

 

     Flonase 50 Flonase 50           No             1{spray QD   Flonase 50     

      



     MCG/ACT MCG/ACT                          _in_eac      MCG/ACT           



                                        h_nostr                     



                                        il}                      

 

     Metoprolol Metoprolol           No             1{table      Metoprolol     

      



     Succinate Succinate                          t}        Succinate           



      MG  MG                                     MG           

 

     Fish Oil Fish Oil           No             1{capsu QD   Fish Oil           



     1000 MG 1000 MG                          le}       1000 MG           

 

     Benzonatate Benzonatate           No                       Benzonatat      

     



     100  MG                                    e 100 MG           

 

     Zithromax Zithromax           No                  QD   Zithromax           



     Z-Bartolome 250 Z-Bartolome 250                                    Z-Bartolome 250           



     MG   MG                                      MG             

 

     predniSONE predniSONE           No                       predniSONE        

   

 

     Ondansetron Ondansetron           No                       Ondansetro      

     



     HCl  HCl                                     n HCl           

 

     amLODIPine amLODIPine           No             1{table      amLODIPine     

      



     Besylate 5 Besylate 5                          t}        Besylate 5        

   



     MG   MG                                      MG             

 

     hydroCHLORO hydroCHLORO           No             1{table QD   hydroCHLOR   

        



     thiazide 25 thiazide 25                          t_in_th      Othiazide    

       



     MG   MG                            e_morni      25 MG           



                                        ng}                      

 

     Boostrix Boostrix           No                       Boostrix           

 

     Benzonatate Benzonatate           No                       Benzonatat      

     



                                                  e              

 

     Quinapril Quinapril           No                       Quinapril           



     HCl 40 MG HCl 40 MG                                    HCl 40 MG           

 

     Dicyclomine Dicyclomine           No                       Dicyclomin      

     



     HCl  HCl                                     e HCl           

 

     Quinapril Quinapril           No                       Quinapril           



     HCl  HCl                                     HCl            

 

     hydroCHLORO hydroCHLORO           No             1{table QD   hydroCHLOR   

        



     thiazide 25 thiazide 25                          t_in_th      Othiazide    

       



     MG   MG                            e_morni      25 MG           



                                        ng}                      

 

     Accupril 40 Accupril 40           No             1{table QD   Accupril     

      



     MG   MG                            t}        40 MG           

 

     Flonase 50 Flonase 50           No             1{spray QD   Flonase 50     

      



     MCG/ACT MCG/ACT                          _in_eac      MCG/ACT           



                                        h_nostr                     



                                        il}                      

 

     Metoprolol Metoprolol           No             1{table      Metoprolol     

      



     Succinate Succinate                          t}        Succinate           



      MG  MG                                     MG           

 

     Fish Oil Fish Oil           No             1{capsu QD   Fish Oil           



     1000 MG 1000 MG                          le}       1000 MG           

 

     Benzonatate Benzonatate           No                       Benzonatat      

     



     100  MG                                    e 100 MG           

 

     Zithromax Zithromax           No                  QD   Zithromax           



     Z-Bartolome 250 Z-Bartolome 250                                    Z-Bartolome 250           



     MG   MG                                      MG             

 

     predniSONE predniSONE           No                       predniSONE        

   

 

     Ondansetron Ondansetron           No                       Ondansetro      

     



     HCl  HCl                                     n HCl           

 

     amLODIPine amLODIPine           No             1{table      amLODIPine     

      



     Besylate 5 Besylate 5                          t}        Besylate 5        

   



     MG   MG                                      MG             

 

     hydroCHLORO hydroCHLORO           No             1{table QD   hydroCHLOR   

        



     thiazide 25 thiazide 25                          t_in_th      Othiazide    

       



     MG   MG                            e_morni      25 MG           



                                        ng}                      

 

     Boostrix Boostrix           No                       Boostrix           

 

     Quinapril Quinapril           No                       Quinapril           



     HCl  HCl                                     HCl            

 

     Ondansetron Ondansetron           No                       Ondansetro      

     



     HCl  HCl                                     n HCl           

 

     Benzonatate Benzonatate           No                       Benzonatat      

     



                                                  e              

 

     Fish Oil Fish Oil           No             1{capsu QD   Fish Oil           



     1000 MG 1000 MG                          le}       1000 MG           

 

     Quinapril Quinapril           No                       Quinapril           



     HCl 40 MG HCl 40 MG                                    HCl 40 MG           

 

     hydroCHLORO hydroCHLORO           No             1{table QD   hydroCHLOR   

        



     thiazide 25 thiazide 25                          t_in_th      Othiazide    

       



     MG   MG                            e_morni      25 MG           



                                        ng}                      

 

     amLODIPine amLODIPine           No                       amLODIPine        

   



     Besylate 5 Besylate 5                                    Besylate 5        

   



     MG   MG                                      MG             

 

     Boostrix Boostrix           No                       Boostrix           

 

     Metoprolol Metoprolol           No             1{table      Metoprolol     

      



     Succinate Succinate                          t}        Succinate           



      MG  MG                                     MG           

 

     Flonase 50 Flonase 50           No             1{spray QD   Flonase 50     

      



     MCG/ACT MCG/ACT                          _in_eac      MCG/ACT           



                                        h_nostr                     



                                        il}                      

 

     Benzonatate Benzonatate           No                       Benzonatat      

     



     100  MG                                    e 100 MG           

 

     Dicyclomine Dicyclomine           No                       Dicyclomin      

     



     HCl  HCl                                     e HCl           

 

     predniSONE predniSONE           No                       predniSONE        

   

 

     Zithromax Zithromax           No                  QD   Zithromax           



     Z-Bartolome 250 Z-Bartolome 250                                    Z-Bartolome 250           



     MG   MG                                      MG             

 

     hydroCHLORO hydroCHLORO           No             1{table QD   hydroCHLOR   

        



     thiazide 25 thiazide 25                          t_in_th      Othiazide    

       



     MG   MG                            e_morni      25 MG           



                                        ng}                      

 

     Quinapril Quinapril           No                       Quinapril           



     HCl 40 MG HCl 40 MG                                    HCl 40 MG           

 

     amLODIPine amLODIPine           No             1{table      amLODIPine     

      



     Besylate 5 Besylate 5                          t}        Besylate 5        

   



     MG   MG                                      MG             

 

     Boostrix Boostrix           No                       Boostrix           

 

     Metoprolol Metoprolol           No             1{table      Metoprolol     

      



     Succinate Succinate                          t}        Succinate           



      MG  MG                                     MG           

 

     Metoprolol Metoprolol           No                       Metoprolol        

   



     Succinate Succinate                                    Succinate           



      MG  MG                                     MG           

 

     Quinapril Quinapril           No                       Quinapril           



     HCl  HCl                                     HCl            

 

     Fish Oil Fish Oil           No             1{capsu QD   Fish Oil           



     1000 MG 1000 MG                          le}       1000 MG           

 

     hydroCHLORO hydroCHLORO           No             1{table QD   hydroCHLOR   

        



     thiazide 25 thiazide 25                          t_in_th      Othiazide    

       



     MG   MG                            e_morni      25 MG           



                                        ng}                      

 

     Dicyclomine Dicyclomine           No                       Dicyclomin      

     



     HCl  HCl                                     e HCl           

 

     amLODIPine amLODIPine           No                       amLODIPine        

   



     Besylate 5 Besylate 5                                    Besylate 5        

   



     MG   MG                                      MG             

 

     Accupril 40 Accupril 40           No             1{table QD   Accupril     

      



     MG   MG                            t}        40 MG           

 

     Benzonatate Benzonatate           No                       Benzonatat      

     



                                                  e              

 

     predniSONE predniSONE           No                       predniSONE        

   

 

     Flonase 50 Flonase 50           No             1{spray QD   Flonase 50     

      



     MCG/ACT MCG/ACT                          _in_eac      MCG/ACT           



                                        h_nostr                     



                                        il}                      

 

     Ondansetron Ondansetron           No                       Ondansetro      

     



     HCl  HCl                                     n HCl           

 

     hydroCHLORO hydroCHLORO           No             1{table QD   hydroCHLOR   

        



     thiazide 25 thiazide 25                          t_in_th      Othiazide    

       



     MG   MG                            e_morni      25 MG           



                                        ng}                      

 

     Benzonatate Benzonatate           No                       Benzonatat      

     



     100  MG                                    e 100 MG           

 

     Zithromax Zithromax           No                  QD   Zithromax           



     Z-Bartolome 250 Z-Bartolome 250                                    Z-Bartolome 250           



     MG   MG                                      MG             

 

     Quinapril Quinapril           No                       Quinapril           



     HCl 40 MG HCl 40 MG                                    HCl 40 MG           

 

     amLODIPine amLODIPine           No             1{table      amLODIPine     

      



     Besylate 5 Besylate 5                          t}        Besylate 5        

   



     MG   MG                                      MG             

 

     Boostrix Boostrix           No                       Boostrix           

 

     Metoprolol Metoprolol           No             1{table      Metoprolol     

      



     Succinate Succinate                          t}        Succinate           



      MG  MG                                     MG           

 

     Metoprolol Metoprolol           No                       Metoprolol        

   



     Succinate Succinate                                    Succinate           



      MG  MG                                     MG           

 

     Quinapril Quinapril           No                       Quinapril           



     HCl  HCl                                     HCl            

 

     Fish Oil Fish Oil           No             1{capsu QD   Fish Oil           



     1000 MG 1000 MG                          le}       1000 MG           

 

     hydroCHLORO hydroCHLORO           No             1{table QD   hydroCHLOR   

        



     thiazide 25 thiazide 25                          t_in_th      Othiazide    

       



     MG   MG                            e_morni      25 MG           



                                        ng}                      

 

     Dicyclomine Dicyclomine           No                       Dicyclomin      

     



     HCl  HCl                                     e HCl           

 

     amLODIPine amLODIPine           No                       amLODIPine        

   



     Besylate 5 Besylate 5                                    Besylate 5        

   



     MG   MG                                      MG             

 

     Accupril 40 Accupril 40           No             1{table QD   Accupril     

      



     MG   MG                            t}        40 MG           

 

     Benzonatate Benzonatate           No                       Benzonatat      

     



                                                  e              

 

     predniSONE predniSONE           No                       predniSONE        

   

 

     Flonase 50 Flonase 50           No             1{spray QD   Flonase 50     

      



     MCG/ACT MCG/ACT                          _in_eac      MCG/ACT           



                                        h_nostr                     



                                        il}                      

 

     Ondansetron Ondansetron           No                       Ondansetro      

     



     HCl  HCl                                     n HCl           

 

     hydroCHLORO hydroCHLORO           No             1{table QD   hydroCHLOR   

        



     thiazide 25 thiazide 25                          t_in_th      Othiazide    

       



     MG   MG                            e_morni      25 MG           



                                        ng}                      

 

     Benzonatate Benzonatate           No                       Benzonatat      

     



     100  MG                                    e 100 MG           

 

     Zithromax Zithromax           No                  QD   Zithromax           



     Z-Bartolome 250 Z-Bartolome 250                                    Z-Bartolome 250           



     MG   MG                                      MG             

 

     Quinapril Quinapril           No                       Quinapril           



     HCl 40 MG HCl 40 MG                                    HCl 40 MG           

 

     amLODIPine amLODIPine           No             1{table      amLODIPine     

      



     Besylate 5 Besylate 5                          t}        Besylate 5        

   



     MG   MG                                      MG             

 

     Boostrix Boostrix           No                       Boostrix           

 

     Metoprolol Metoprolol           No             1{table      Metoprolol     

      



     Succinate Succinate                          t}        Succinate           



      MG  MG                                     MG           

 

     Metoprolol Metoprolol           No                       Metoprolol        

   



     Succinate Succinate                                    Succinate           



      MG  MG                                     MG           

 

     Quinapril Quinapril           No                       Quinapril           



     HCl  HCl                                     HCl            

 

     Fish Oil Fish Oil           No             1{capsu QD   Fish Oil           



     1000 MG 1000 MG                          le}       1000 MG           

 

     hydroCHLORO hydroCHLORO           No             1{table QD   hydroCHLOR   

        



     thiazide 25 thiazide 25                          t_in_th      Othiazide    

       



     MG   MG                            e_morni      25 MG           



                                        ng}                      

 

     Dicyclomine Dicyclomine           No                       Dicyclomin      

     



     HCl  HCl                                     e HCl           

 

     amLODIPine amLODIPine           No                       amLODIPine        

   



     Besylate 5 Besylate 5                                    Besylate 5        

   



     MG   MG                                      MG             

 

     Accupril 40 Accupril 40           No             1{table QD   Accupril     

      



     MG   MG                            t}        40 MG           

 

     Benzonatate Benzonatate           No                       Benzonatat      

     



                                                  e              

 

     predniSONE predniSONE           No                       predniSONE        

   

 

     Flonase 50 Flonase 50           No             1{spray QD   Flonase 50     

      



     MCG/ACT MCG/ACT                          _in_eac      MCG/ACT           



                                        h_nostr                     



                                        il}                      

 

     Ondansetron Ondansetron           No                       Ondansetro      

     



     HCl  HCl                                     n HCl           

 

     hydroCHLORO hydroCHLORO           No             1{table QD   hydroCHLOR   

        



     thiazide 25 thiazide 25                          t_in_th      Othiazide    

       



     MG   MG                            e_morni      25 MG           



                                        ng}                      

 

     Benzonatate Benzonatate           No                       Benzonatat      

     



     100  MG                                    e 100 MG           

 

     Zithromax Zithromax           No                  QD   Zithromax           



     Z-Bartolome 250 Z-Bartolome 250                                    Z-Bartolome 250           



     MG   MG                                      MG             

 

     Quinapril Quinapril           No                       Quinapril           



     HCl 40 MG HCl 40 MG                                    HCl 40 MG           

 

     amLODIPine amLODIPine           No             1{table      amLODIPine     

      



     Besylate 5 Besylate 5                          t}        Besylate 5        

   



     MG   MG                                      MG             

 

     Boostrix Boostrix           No                       Boostrix           

 

     Metoprolol Metoprolol           No             1{table      Metoprolol     

      



     Succinate Succinate                          t}        Succinate           



      MG  MG                                     MG           

 

     Metoprolol Metoprolol           No                       Metoprolol        

   



     Succinate Succinate                                    Succinate           



      MG  MG                                     MG           

 

     Quinapril Quinapril           No                       Quinapril           



     HCl  HCl                                     HCl            

 

     Fish Oil Fish Oil           No             1{capsu QD   Fish Oil           



     1000 MG 1000 MG                          le}       1000 MG           

 

     hydroCHLORO hydroCHLORO           No             1{table QD   hydroCHLOR   

        



     thiazide 25 thiazide 25                          t_in_th      Othiazide    

       



     MG   MG                            e_morni      25 MG           



                                        ng}                      

 

     Dicyclomine Dicyclomine           No                       Dicyclomin      

     



     HCl  HCl                                     e HCl           

 

     amLODIPine amLODIPine           No                       amLODIPine        

   



     Besylate 5 Besylate 5                                    Besylate 5        

   



     MG   MG                                      MG             

 

     Accupril 40 Accupril 40           No             1{table QD   Accupril     

      



     MG   MG                            t}        40 MG           

 

     Benzonatate Benzonatate           No                       Benzonatat      

     



                                                  e              

 

     predniSONE predniSONE           No                       predniSONE        

   

 

     Flonase 50 Flonase 50           No             1{spray QD   Flonase 50     

      



     MCG/ACT MCG/ACT                          _in_eac      MCG/ACT           



                                        h_nostr                     



                                        il}                      

 

     Ondansetron Ondansetron           No                       Ondansetro      

     



     HCl  HCl                                     n HCl           

 

     hydroCHLORO hydroCHLORO           No             1{table QD   hydroCHLOR   

        



     thiazide 25 thiazide 25                          t_in_th      Othiazide    

       



     MG   MG                            e_morni      25 MG           



                                        ng}                      

 

     Benzonatate Benzonatate           No                       Benzonatat      

     



     100  MG                                    e 100 MG           

 

     Zithromax Zithromax           No                  QD   Zithromax           



     Z-Bartolome 250 Z-Bartolome 250                                    Z-Bartolome 250           



     MG   MG                                      MG             







Immunizations







           Ordered Immunization Filled Immunization Date       Status     Commen

ts   Source



           Name       Name                                        

 

           FLUZONE HIGH DOSE FLUZONE HIGH DOSE 2022 Completed             

[facility]



           OVER 65    OVER 65    13:47:00                         [facility]

 

           FLUZONE HIGH DOSE FLUZONE HIGH DOSE 2022 Completed             

[facility]



           OVER 65    OVER 65    13:47:00                         - [facility]

 

           FLUZONE HIGH DOSE FLUZONE HIGH DOSE 2022 Completed             

[facility]



           OVER 65    OVER 65    13:47:00                         - [facility]

 

           FLUZONE HIGH DOSE FLUZONE HIGH DOSE 2022 Completed             

[facility]



           OVER 65    OVER 65    13:47:00                         - [facility]

 

           FLUZONE HIGH DOSE FLUZONE HIGH DOSE 2022 Completed             

[facility]



           OVER 65    OVER 65    13:47:00                         - [facility]

 

           FLUZONE HIGH DOSE FLUZONE HIGH DOSE 2022 Completed             

[facility]



           OVER 65    OVER 65    13:47:00                         - [facility]

 

           FLUZONE HIGH DOSE FLUZONE HIGH DOSE 2022 Completed             

[facility]



           OVER 65    OVER 65    13:47:00                         - [facility]

 

           FLUZONE HIGH DOSE FLUZONE HIGH DOSE 2022 Completed             

[facility]



           OVER 65    OVER 65    13:47:00                         - [facility]

 

           FLUZONE HIGH DOSE FLUZONE HIGH DOSE 2021 Completed             

[facility]



           OVER 65    OVER 65    15:17:00                         - [facility]

 

           FLUZONE HIGH DOSE FLUZONE HIGH DOSE 2021 Completed             

[facility]



           OVER 65    OVER 65    15:17:00                         - [facility]

 

           FLUZONE HIGH DOSE FLUZONE HIGH DOSE 2021 Completed             

[facility]



           OVER 65    OVER 65    15:17:00                         - [facility]

 

           FLUZONE HIGH DOSE FLUZONE HIGH DOSE 2021 Completed             

[facility]



           OVER 65    OVER 65    15:17:00                         - [facility]

 

           FLUZONE HIGH DOSE FLUZONE HIGH DOSE 2021 Completed             

[facility]



           OVER 65    OVER 65    15:17:00                         - [facility]

 

           FLUZONE HIGH DOSE FLUZONE HIGH DOSE 2021 Completed             

[facility]



           OVER 65    OVER 65    15:17:00                         - [facility]

 

           FLUZONE HIGH DOSE FLUZONE HIGH DOSE 2021 Completed             

[facility]



           OVER 65    OVER 65    15:17:00                         - [facility]

 

           FLUZONE HIGH DOSE FLUZONE HIGH DOSE 2021 Completed             

[facility]



           OVER 65    OVER 65    15:17:00                         - [facility]

 

           FLUZONE HIGH DOSE FLUZONE HIGH DOSE 2021 Completed             

[facility]



           OVER 65    OVER 65    15:17:00                         - [facility]

 

           FLUZONE HIGH DOSE FLUZONE HIGH DOSE 2021 Completed             

[facility]



           OVER 65    OVER 65    15:17:00                         - [facility]

 

           FLUZONE HIGH DOSE FLUZONE HIGH DOSE 2021 Completed             

[facility]



           OVER 65    OVER 65    15:17:00                         - [facility]

 

           FLUZONE HIGH DOSE FLUZONE HIGH DOSE 2021 Completed             

[facility]



           OVER 65    OVER 65    15:17:00                         - [facility]

 

           FLUZONE HIGH DOSE FLUZONE HIGH DOSE 2021 Completed             

[facility]



           OVER 65    OVER 65    15:17:00                         - [facility]

 

           FLUZONE HIGH DOSE FLUZONE HIGH DOSE 2021 Completed             

[facility]



           OVER 65    OVER 65    15:17:00                         - [facility]

 

           FLUZONE HIGH DOSE FLUZONE HIGH DOSE 2021 Completed             

[facility]



           OVER 65    OVER 65    15:17:00                         - [facility]

 

           FLUZONE HIGH DOSE FLUZONE HIGH DOSE 2021 Completed             

[facility]



           OVER 65    OVER 65    15:17:00                         - [facility]

 

           FLUZONE HIGH DOSE FLUZONE HIGH DOSE 2021 Completed             

[facility]



           OVER 65    OVER 65    15:17:00                         - [facility]

 

           FLUZONE HIGH DOSE FLUZONE HIGH DOSE 2021 Completed             

[facility]



           OVER 65    OVER 65    15:17:00                         - [facility]

 

           FLUZONE HIGH DOSE FLUZONE HIGH DOSE 2021 Completed             

[facility]



           OVER 65    OVER 65    15:17:00                         - [facility]

 

           FLUZONE HIGH DOSE FLUZONE HIGH DOSE 2021 Completed             

[facility]



           OVER 65    OVER 65    15:17:00                         - [facility]

 

           FLUZONE HIGH DOSE FLUZONE HIGH DOSE 2021 Completed             

[facility]



           OVER 65    OVER 65    15:17:00                         - [facility]

 

           FLUZONE HIGH DOSE FLUZONE HIGH DOSE 2021 Completed             

[facility]



           OVER 65    OVER 65    15:17:00                         - [facility]

 

           FLUZONE HIGH DOSE FLUZONE HIGH DOSE 2021 Completed             

[facility]



           OVER 65    OVER 65    15:17:00                         - [facility]

 

           FLUZONE HIGH DOSE FLUZONE HIGH DOSE 2021 Completed             

[facility]



           OVER 65    OVER 65    15:17:00                         - [facility]

 

           FLUZONE HIGH DOSE FLUZONE HIGH DOSE 2021 Completed             

[facility]



           OVER 65    OVER 65    15:17:00                         - [facility]

 

           FLUZONE HIGH DOSE FLUZONE HIGH DOSE 2021 Completed             

[facility]



           OVER 65    OVER 65    15:17:00                         - [facility]

 

           FLUZONE HIGH DOSE FLUZONE HIGH DOSE 2021 Completed             

[facility]



           OVER 65    OVER 65    15:17:00                         - [facility]

 

           FLUZONE HIGH DOSE FLUZONE HIGH DOSE 2021 Completed             

[facility]



           OVER 65    OVER 65    15:17:00                         - [facility]

 

           FLUZONE HIGH DOSE FLUZONE HIGH DOSE 2021 Completed             

[facility]



           OVER 65    OVER 65    15:17:00                         - [facility]

 

           FLUZONE HIGH DOSE FLUZONE HIGH DOSE 2021 Completed             

[facility]



           OVER 65    OVER 65    15:17:00                         - [facility]

 

           FLUZONE HIGH DOSE FLUZONE HIGH DOSE 2021 Completed             

[facility]



           OVER 65    OVER 65    15:17:00                         - [facility]

 

           FLUZONE HIGH DOSE FLUZONE HIGH DOSE 2021 Completed             

[facility]



           OVER 65    OVER 65    15:17:00                         - [facility]

 

           FLUZONE HIGH DOSE FLUZONE HIGH DOSE 2021 Completed             

[facility]



           OVER 65    OVER 65    15:17:00                         - [facility]

 

           FLUZONE HIGH DOSE FLUZONE HIGH DOSE 2021 Completed             

[facility]



           OVER 65    OVER 65    15:17:00                         - [facility]

 

           FLUZONE HIGH DOSE FLUZONE HIGH DOSE 2021 Completed             

[facility]



           OVER 65    OVER 65    15:17:00                         - [facility]

 

           FLUZONE HIGH DOSE FLUZONE HIGH DOSE 2021 Completed             

[facility]



           OVER 65    OVER 65    15:17:00                         - [facility]

 

           Hyalgan 20 mg Hyalgan 20 mg 2020 Completed             [facility]



                                 [facility]00                         [facility]

 

           Hyalgan 20 mg Hyalgan 20 mg 2020 Completed             [facility]



                                 08:01:00                         [facility]

 

           Hyalgan 20 mg Hyalgan 20 mg 2020-12-10 Completed             [facility]



                                 09:26:00                         [facility]

 

           Hyalgan 20 mg Hyalgan 20 mg 2020-12-10 Completed             [facility]



                                 09:25:00                         - [facility]

 

           Bupivicaine Hydro Bupivicaine Hydro 2020 Completed             

[facility]



                                 09:45:00                         - [facility]

 

           Bupivicaine Hydro Bupivicaine Hydro 2020 Completed             

[facility]



                                 09:45:                         [facility]

 

           Kenalog    Kenalog    2020 Completed             [facility]



           (Triamcinolone) (Triamcinolone) 09:45:00                         - [facility]

 

           Hyalgan 20 mg Hyalgan 20 mg 2020 Completed             [facility]



                                 09:44:00                         - [facility]

 

           Hyalgan 20 mg Hyalgan 20 mg 2020 Completed             [facility]



                                 09:44:00                         - [facility]

 

           Kenalog    Kenalog    2020 Completed             [facility]



           (Triamcinolone) (Triamcinolone) 09:44:00                         - [facility]

 

           Fluzone    Fluzone    2020-09-10 Completed             [facility]



                                 16:49:00                         [facility]

 

           Fluzone    Fluzone    2020-09-10 Completed             [facility]



                                 16:49:00                         - [facility]

 

           Fluzone    Fluzone    2020-09-10 Completed             [facility]



                                 16:49:00                         - [facility]

 

           Fluzone    Fluzone    2020-09-10 Completed             [facility]



                                 16:49:00                         - [facility]

 

           Fluzone    Fluzone    2020-09-10 Completed             [facility]



                                 16:49:00                         - [facility]

 

           Fluzone    Fluzone    2020-09-10 Completed             [facility]



                                 16:49:00                         [facility]

 

           Fluzone    Fluzone    2020-09-10 Completed             [facility]



                                 16:49:00                         - [facility]

 

           Fluzone    Fluzone    2020-09-10 Completed             [facility]



                                 16:49:00                         [facility]

 

           Fluzone    Fluzone    2020-09-10 Completed             [facility]



                                 16:49:00                         - [facility]

 

           Fluzone    Fluzone    2020-09-10 Completed             [facility]



                                 16:49:00                         - [facility]

 

           Fluzone    Fluzone    2020-09-10 Completed             [facility]



                                 16:49:00                         - [facility]

 

           Fluzone    Fluzone    2020-09-10 Completed             [facility]



                                 16:49:00                         - [facility]

 

           Fluzone    Fluzone    2020-09-10 Completed             [facility]



                                 16:49:00                         - [facility]

 

           Fluzone    Fluzone    2020-09-10 Completed             [facility]



                                 16:49:00                         - [facility]

 

           Fluzone    Fluzone    2020-09-10 Completed             [facility]



                                 16:49:00                         - [facility]

 

           Fluzone    Fluzone    2020-09-10 Completed             [facility]



                                 16:49:00                         - [facility]

 

           Fluzone    Fluzone    2020-09-10 Completed             [facility]



                                 16:49:00                         - [facility]

 

           Fluzone    Fluzone    2020-09-10 Completed             [facility]



                                 16:49:00                         - [facility]

 

           Fluzone    Fluzone    2020-09-10 Completed             [facility]



                                 16:49:00                         - [facility]

 

           Fluzone    Fluzone    2020-09-10 Completed             [facility]



                                 16:49:00                         - [facility]

 

           Fluzone    Fluzone    2020-09-10 Completed             [facility]



                                 16:49:00                         - [facility]

 

           Fluzone    Fluzone    2020-09-10 Completed             [facility]



                                 16:49:00                         - [facility]

 

           Fluzone    Fluzone    2020-09-10 Completed             [facility]



                                 16:49:00                         - [facility]

 

           Fluzone    Fluzone    2020-09-10 Completed             [facility]



                                 16:49:00                         - [facility]

 

           Fluzone    Fluzone    2020-09-10 Completed             [facility]



                                 16:49:00                         - [facility]

 

           Fluzone    Fluzone    2020-09-10 Completed             [facility]



                                 16:49:00                         - [facility]

 

           Fluzone    Fluzone    2020-09-10 Completed             [facility]



                                 16:49:00                         - [facility]

 

           Fluzone    Fluzone    2020-09-10 Completed             [facility]



                                 16:49:00                         - [facility]

 

           Fluzone    Fluzone    2020-09-10 Completed             [facility]



                                 16:49:00                         - [facility]

 

           Fluzone    Fluzone    2020-09-10 Completed             [facility]



                                 16:49:00                         - [facility]

 

           Fluzone    Fluzone    2020-09-10 Completed             [facility]



                                 16:49:00                         - [facility]

 

           Fluzone    Fluzone    2020-09-10 Completed             [facility]



                                 16:49:00                         - [facility]

 

           Fluzone    Fluzone    2020-09-10 Completed             [facility]



                                 16:49:00                         - [facility]

 

           Fluzone    Fluzone    2020-09-10 Completed             [facility]



                                 16:49:00                         - [facility]

 

           Fluzone    Fluzone    2020-09-10 Completed             [facility]



                                 16:49:00                         - [facility]

 

           Fluzone    Fluzone    2020-09-10 Completed             [facility]



                                 16:49:00                         - [facility]

 

           Fluzone    Fluzone    2020-09-10 Completed             [facility]



                                 16:49:00                         - [facility]

 

           Fluzone    Fluzone    2020-09-10 Completed             [facility]



                                 16:49:00                         - [facility]

 

           Fluzone    Fluzone    2020-09-10 Completed             [facility]



                                 16:49:00                         - [facility]

 

           Fluzone    Fluzone    2020-09-10 Completed             [facility]



                                 16:49:00                         - [facility]

 

           Fluzone    Fluzone    2020-09-10 Completed             [facility]



                                 16:49:00                         - [facility]

 

           Fluzone    Fluzone    2020-09-10 Completed             [facility]



                                 16:49:00                         - [facility]

 

           Kenalog    Kenalog    2019 Completed             [facility]



           (Triamcinolone) (Triamcinolone) 09:47:00                         [facility]

 

           Hyalgan 20 mg Hyalgan 20 mg 2019 Completed             [facility]



                                 13:45:00                         - [facility]

 

           Hyalgan 20 mg Hyalgan 20 mg 2019 Completed             [facility]



                                 13:45:00                         [facility]

 

           LIDOCAINE HCL LIDOCAINE HCL 2019 Completed             [facility]



           10MG/ML    10MG/ML    13:44:00                         [facility]

 

           LIDOCAINE HCL LIDOCAINE HCL 2019 Completed             [facility]



           10MG/ML    10MG/ML    13:43:00                         [facility]

 

           Hyalgan 20 mg Hyalgan 20 mg 2019 Completed             [facility]



                                 13:21:00                         [facility]

 

           LIDOCAINE HCL LIDOCAINE HCL 2019 Completed             [facility]



           10MG/ML    10MG/ML    13:20:00                         [facility]

 

           Hyalgan 20 mg Hyalgan 20 mg 2019 Completed             [facility]



                                 13:14:00                         [facility]

 

           LIDOCAINE HCL LIDOCAINE HCL 2019 Completed             [facility]



           10MG/ML    10MG/ML    13:13:00                         [facility]

 

           LIDOCAINE HCL LIDOCAINE HCL 2019 Completed             [facility]



           10MG/ML    10MG/ML    11:32:00                         [facility]

 

           LIDOCAINE HCL LIDOCAINE HCL 2019 Completed             [facility]



           10MG/ML    10MG/ML    11:32:00                         [facility]

 

           Hyalgan 20 mg Hyalgan 20 mg 2019 Completed             [facility]



                                 11:29:00                         [facility]

 

           Hyalgan 20 mg Hyalgan 20 mg 2019 Completed             [facility]



                                 11:24:00                         [facility]







Vital Signs







             Vital Name   Observation Time Observation Value Comments     Source

 

             height       2023 11:20:00 61.50 [in_i]              [facility]

 

             weight       2023 11:20:00 192.4 [lb_av]              [facility]

 

             bmi          2023 11:20:00 35.76 kg/m2               [facility]

 

             height       2022 10:00:00 61.50 [in_i]              [facility]

 

             weight       2022 10:00:00 192.4 [lb_av]              [facility]

 

             temperature  2022 10:00:00 97.9 [degF]               [facility]

 

             bmi          2022 10:00:00 35.76 kg/m2               [facility] -



                                                                 [facility]

 

             blood pressure 2022 10:00:00 129 mm[Hg]                [facility] -



             systolic                                            [facility]

 

             blood pressure 2022 10:00:00 76 mm[Hg]                 [facility] -



             diastolic                                           [facility]

 

             height       2022 14:20:00 61.50 [in_i]              [facility] -



                                                                 [facility]

 

             weight       2022 14:20:00 187.0 [lb_av]              [facility] -



                                                                 [facility]

 

             temperature  2022 14:20:00 97.2 [degF]               [facility]

 

             bmi          2022 14:20:00 34.76 kg/m2               [facility]

 

             oximetry     2022 14:20:00 95 %                      [facility]

 

             respiratory rate 2022 14:20:00 18 /min                   [facility]

[facility] -



                                                                 [facility]

 

             blood pressure 2022 14:20:00 132 mm[Hg]                [facility] -



             systolic                                            [facility]

 

             blood pressure 2022 14:20:00 78 mm[Hg]                 [facility] -



             diastolic                                           [facility]

 

             height       2022-10-06 10:30:00 61.50 [in_i]              [facility]

 

             weight       2022-10-06 10:30:00 186 [lb_av]               [facility] -



                                                                 [facility]

 

             temperature  2022-10-06 10:30:00 97.2 [degF]               [facility]

 

             bmi          2022-10-06 10:30:00 34.57 kg/m2               [facility]

 

             blood pressure 2022-10-06 10:30:00 134 mm[Hg]                [facility] -



             systolic                                            [facility]

 

             blood pressure 2022-10-06 10:30:00 72 mm[Hg]                 [facility] -



             diastolic                                           [facility]

 

             height       2022 16:20:00 61.5 [in_i]               [facility]

 

             weight       2022 16:20:00 186.8 [lb_av]              [facility] -



                                                                 [facility]

 

             temperature  2022 16:20:00 97.8 [degF]               [facility]

 

             bmi          2022 16:20:00 34.72 kg/m2               [facility]

 

             oximetry     2022 16:20:00 97 %                      [facility]

 

             blood pressure 2022 16:20:00 138 mm[Hg]                [facility] -



             systolic                                            [facility]

 

             blood pressure 2022 16:20:00 80 mm[Hg]                 Common

 [facility] -



             diastolic                                           [facility]

 

             height       2022 10:30:00 61.50 [in_i]              Common [facility]

 

             weight       2022 10:30:00 187 [lb_av]               Common S

[facility]irit [facility]

 

             temperature  2022 10:30:00 97.2 [degF]               Common S

[facility]

 

             bmi          2022 10:30:00 34.76 kg/m2               Common S

[facility]

 

             blood pressure 2022 10:30:00 132 mm[Hg]                Common

 [facility] -



             systolic                                            [facility]

 

             blood pressure 2022 10:30:00 74 mm[Hg]                 Common

 [facility] -



             diastolic                                           [facility]

 

             height       2022 16:20:00 61.50 [in_i]              Common S

[facility]irit [facility]

 

             weight       2022 16:20:00 186.8 [lb_av]              Common 

[facility] -



                                                                 [facility]

 

             temperature  2022 16:20:00 97.8 [degF]               Common [facility]

 

             bmi          2022 16:20:00 34.72 kg/m2               Common S

[facility]

 

             oximetry     2022 16:20:00 97 %                      Common S

[facility]

 

             respiratory rate 2022 16:20:00 18 /min                   Comm

on [facility] -



                                                                 [facility]

 

             blood pressure 2022 16:20:00 138 mm[Hg]                Common

 [facility] -



             systolic                                            [facility]

 

             blood pressure 2022 16:20:00 80 mm[Hg]                 Common

 [facility] -



             diastolic                                           [facility]

 

             height       2022 10:30:00 61.50 [in_i]              Common S

pirit -



                                                                 [facility]

 

             weight       2022 10:30:00 187 [lb_av]               Common S

[facility]

 

             temperature  2022 10:30:00 97.6 [degF]               [facility]

[facility]

 

             bmi          2022 10:30:00 34.76 kg/m2               [facility]

[facility]

 

             blood pressure 2022 10:30:00 132 mm[Hg]                Common

 [facility] -



             systolic                                            [facility]

 

             blood pressure 2022 10:30:00 74 mm[Hg]                 Common

 [facility] -



             diastolic                                           [facility]

 

             height       2022 11:00:00 61.50 [in_i]              [facility] S

[facility]

 

             weight       2022 11:00:00 187.1 [lb_av]              Common 

[facility] -



                                                                 [facility]

 

             bmi          2022 11:00:00 34.78 kg/m2               Common S

pirit -



                                                                 [facility]

 

             blood pressure 2022 11:00:00 134 mm[Hg]                Common

 [facility] -



             systolic                                            [facility]

 

             blood pressure 2022 11:00:00 82 mm[Hg]                 Common

 [facility] -



             diastolic                                           [facility]

 

             height       2022 10:30:00 61.50 [in_i]              Common S

[facility]irit [facility]

 

             weight       2022 10:30:00 192 [lb_av]               Common S

[facility]

 

             bmi          2022 10:30:00 35.69 kg/m2               Common S

[facility]

 

             blood pressure 2022 10:30:00 140 mm[Hg]                Common

 [facility] -



             systolic                                            [facility]

 

             blood pressure 2022 10:30:00 76 mm[Hg]                 Common

 [facility] -



             diastolic                                           [facility]

 

             height       2022 13:00:00 61.50 [in_i]              Common S

[facility]

 

             weight       2022 13:00:00 189 [lb_av]               Common [facility]

 

             temperature  2022 13:00:00 97.3 [degF]               Common S

[facility]

 

             bmi          2022 13:00:00 35.13 kg/m2               [facility]

 

             oximetry     2022 13:00:00 98 %                      [facility]

 

             respiratory rate 2022 13:00:00 16 /min                   Comm

on [facility]

 

             blood pressure 2022 13:00:00 130 mm[Hg]                Common

 [facility] -



             systolic                                            [facility]

 

             blood pressure 2022 13:00:00 80 mm[Hg]                 [facility]

 [facility] -



             diastolic                                           [facility]

 

             height       2022 11:00:00 61.50 [in_i]              Common S

[facility]

 

             weight       2022 11:00:00 192 [lb_av]               [facility] S

[facility]

 

             temperature  2022 11:00:00 97.6 [degF]               Common S

[facility]

 

             bmi          2022 11:00:00 35.69 kg/m2               Common S

[facility]

 

             blood pressure 2022 11:00:00 134 mm[Hg]                Common

 [facility] -



             systolic                                            [facility]

 

             blood pressure 2022 11:00:00 82 mm[Hg]                 [facility]

 [facility] -



             diastolic                                           [facility]

 

             height       2022 10:30:00 61.50 [in_i]              [facility] S

[facility]

 

             weight       2022 10:30:00 192 [lb_av]               Common S

[facility]

 

             temperature  2022 10:30:00 97.3 [degF]               Common S

[facility]

 

             bmi          2022 10:30:00 35.69 kg/m2               Common S

[facility]

 

             blood pressure 2022 10:30:00 126 mm[Hg]                [facility]

 [facility] -



             systolic                                            [facility]

 

             blood pressure 2022 10:30:00 72 mm[Hg]                 [facility]

 [facility] -



             diastolic                                           [facility]

 

             height       2022 10:30:00 61.50 [in_i]              Common [facility]

 

             weight       2022 10:30:00 192 [lb_av]               [facility]

 

             temperature  2022 10:30:00 97.3 [degF]               [facility]

 

             bmi          2022 10:30:00 35.69 kg/m2               [facility]

 

             blood pressure 2022 10:30:00 128 mm[Hg]                Common

 [facility] -



             systolic                                            [facility]

 

             blood pressure 2022 10:30:00 76 mm[Hg]                 [facility]

 [facility] -



             diastolic                                           [facility]

 

             height       2022 11:00:00 61.50 [in_i]              [facility]

 

             weight       2022 11:00:00 192.4 [lb_av]              Common 

[facility]

 

             temperature  2022 11:00:00 97.3 [degF]               [facility]

 

             bmi          2022 11:00:00 35.76 kg/m2               [facility]

 

             oximetry     2022 11:00:00 95 %                      [facility]

 

             respiratory rate 2022 11:00:00 16 /min                   Comm

on [facility]

 

             blood pressure 2022 11:00:00 134 mm[Hg]                Common

 [facility] -



             systolic                                            [facility]

 

             blood pressure 2022 11:00:00 82 mm[Hg]                 [facility]

 [facility] -



             diastolic                                           [facility]

 

             Systolic blood 2021 21:01:00 134 mm[Hg]                [facility]iver

[facility]ity of



             pressure                                            [facility]

 

             Diastolic blood 2021 21:01:00 84 mm[Hg]                 Un[facility]

[facility] of



             pressure                                            [facility]

 

             Heart rate   2021 21:01:00 84 /min                   [facility]

[facility]

 

             Body height  2021 21:01:00 160 cm                    [facility]

[facility]

 

             Body weight  2021 21:01:00 86.183 kg                 [facility]

[facility]

 

             BMI          2021 21:01:00 33.66 kg/m2               [facility]

 

             Oxygen saturation in 2021 21:01:00 95 /min                   

[facility] blood by                                        [place] [facility]

reyna



             Pulse oximetry                                        [facility]

 

             height       2021 13:00:00 61.50 [in_i]              Common [facility]

 

             weight       2021 13:00:00 189 [lb_av]               [facility]

 

             temperature  2021 13:00:00 97.1 [degF]               [facility]

 

             bmi          2021 13:00:00 35.13 kg/m2               [facility]

 

             blood pressure 2021 13:00:00 122 mm[Hg]                [facility]

 [facility] -



             systolic                                            [facility]

 

             blood pressure 2021 13:00:00 76 mm[Hg]                 Common

 [facility] -



             diastolic                                           [facility]

 

             height       2021 10:30:00 61.50 [in_i]              [facility]

 

             weight       2021 10:30:00 189 [lb_av]               [facility]

 

             bmi          2021 10:30:00 35.13 kg/m2               [facility]

 

             height       2021 16:20:00 61.50 [in_i]              [facility]

 

             weight       2021 16:20:00 187.8 [lb_av]              Common 

[facility]

 

             temperature  2021 16:20:00 97.3 [degF]               [facility]

 

             bmi          2021 16:20:00 34.91 kg/m2               [facility]

 

             oximetry     2021 16:20:00 100 %                     [facility]

 

             respiratory rate 2021 16:20:00 16 /min                   Comm

on [facility]

 

             blood pressure 2021 16:20:00 136 mm[Hg]                Common

 [facility]



             systolic                                            [facility]

 

             blood pressure 2021 16:20:00 72 mm[Hg]                 [facility]



             diastolic                                           [facility]







Procedures







                Procedure       Date / Time     Performing Clinician Source



                                Performed                       

 

                MEDICAL         2022 05:01:00 Doctor Unassigned, No [facility]

[facility] [place]



                RELEASE/CLEARANCE FORMS                 [facility]

 

                MEDICAL         2022 06:01:00 Doctor Unassigned, No [facility]

[facility] [place]



                RELEASE/CLEARANCE FORMS                 [facility]

 

                AUTHORIZATION FOR 2022 06:01:00 Doctor Unassigned, No [facility]



                RELEASE OF PHI                  [facility]

 

                EKG-12 LEAD     2021 21:08:22 Hector Michaud   [place] o

f [facility]

 

                EXTERNAL PROVIDER - ADC 2021 06:01:00 Doctor Unassigned, N

o [facility]



                REFERRAL                        [facility]







Plan of Care







             Planned Activity Planned Date Details      Comments     Source

 

             Future Scheduled 2023   COVID-19 VACCINE (#1)              [facility]



             Test         10:41:50     [code = COVID-19              



                                       VACCINE (#1)]              

 

             Future Scheduled 2023   BREAST CANCER              [facility]



             Test         10:41:50     SCREENING [code =              



                                       BREAST CANCER              



                                       SCREENING]                

 

             Future Scheduled 2023   COLONOSCOPY SCREENING              [facility]



             Test         10:41:50     [code = COLONOSCOPY              



                                       SCREENING]                

 

             Future Scheduled 2023   SHINGLES VACCINES (1              Met

[facility]



             Test         10:41:50     of 2) [code = SHINGLES              



                                       VACCINES (1 of 2)]              

 

             Future Scheduled 2023   65+ PNEUMOCOCCAL              [facility]

 Hospital



             Test         10:41:50     VACCINE (1 - PCV)              



                                       [code = 65+               



                                       PNEUMOCOCCAL VACCINE              



                                       (1 - PCV)]                

 

             Future Scheduled 2023   INFLUENZA VACCINE              Method

[facility]



             Test         10:41:50     [code = INFLUENZA              



                                       VACCINE]                  

 

             Future Scheduled 2023   COVID-19 VACCINE (#1)              [facility]



             Test         03:09:43     [code = COVID-19              



                                       VACCINE (#1)]              

 

             Future Scheduled 2023   [facility] CANCER              [facility]



             Test         03:09:43     SCREENING [code =              



                                       BREAST CANCER              



                                       SCREENING]                

 

             Future Scheduled 2023   COLONOSCOPY SCREENING              [facility]



             Test         03:09:43     [code = COLONOSCOPY              



                                       SCREENING]                

 

             Future Scheduled 2023   SHINGLES VACCINES (1              Met

[facility]



             Test         03:09:43     of 2) [code = SHINGLES              



                                       VACCINES (1 of 2)]              

 

             Future Scheduled 2023   65+ PNEUMOCOCCAL              [facility]



             Test         03:09:43     VACCINE (1 - PCV)              



                                       [code = 65+               



                                       PNEUMOCOCCAL VACCINE              



                                       (1 - PCV)]                

 

             Future Scheduled 2023   INFLUENZA VACCINE              Method

[facility]



             Test         03:09:43     [code = INFLUENZA              



                                       VACCINE]                  

 

             Future Scheduled 2022   HEPATITIS B VACCINES              Met

[facility]



             Test         10:21:52     (1 of 3 - 3-dose              



                                       series) [code =              



                                       HEPATITIS B VACCINES              



                                       (1 of 3 - 3-dose              



                                       series)]                  

 

             Future Scheduled 2022   COVID-19 VACCINE (#1)              [facility]



             Test         10:21:52     [code = COVID-19              



                                       VACCINE (#1)]              

 

             Future Scheduled 2022   BREAST CANCER              [facility]



             Test         10:21:52     SCREENING [code =              



                                       BREAST CANCER              



                                       SCREENING]                

 

             Future Scheduled 2022   COLONOSCOPY SCREENING              [facility]



             Test         10:21:52     [code = COLONOSCOPY              



                                       SCREENING]                

 

             Future Scheduled 2022   SHINGLES VACCINES (1              Met

[facility]



             Test         10:21:52     of 2) [code = SHINGLES              



                                       VACCINES (1 of 2)]              

 

             Future Scheduled 2022   65+ PNEUMOCOCCAL              [facility]

 Hospital



             Test         10:21:52     VACCINE (1 - PCV)              



                                       [code = 65+               



                                       PNEUMOCOCCAL VACCINE              



                                       (1 - PCV)]                

 

             Future Scheduled 2022   INFLUENZA VACCINE              Method

[facility]



             Test         10:21:52     [code = INFLUENZA              



                                       VACCINE]                  







Encounters







        Start   End     Encounter Admission Attending Care    Care    Encounter 

Source



        Date/Time Date/Time Type    Type    Clinicians Facility Department ID   

   

 

        2023         Outpatient         Duvall, STLMLC  STLMLC  [id number]

 Common



        10:54:00                         Ilda                  [id number]   [facility]

 

        2023         Outpatient         BALES, Na STLMLC  STLMLC  [id number]

2 Common



        16:26:00                                                 [id number]   [facility]

 

        2022         Outpatient         Bales, Na STLMLC  STLMLC  [id number]

2 Common



        08:31:00                                                    [facility]

 

        2022         Outpatient         Bales, Na STLMLC  STLMLC  [id number]

2 Common



        10:59:01                                                    [facility]

 

        2022-10-10         Outpatient         Bales, Na STLMLC  STLMLC  [id number]

2 Common



        10:30:01                                                    [facility]

 

        2022         Outpatient         Bales, Na STLMLC  STLMLC  [id number]

2 Common



        14:35:00                                                    [facility]

 

        2022         Outpatient         Bales, Na STLMLC  STLMLC  [id number]

2 Common



        10:41:01                                                    [facility]

 

        2022         Outpatient         Bales, Na STLMLC  STLMLC  [id number]

2 Common



        10:17:00                                                    [facility]

 

        2022         Outpatient         Bales, Na STLMLC  STLMLC  [id number]

2 Common



        11:59:01                                                    [facility]

 

        2022         Outpatient         Bales, Na STLMLC  STLMLC  [id number]

2 Common



        13:05:00                                                    [facility]

 

        2022         Outpatient         Bales, Na STLMLC  STLMLC  [id number]

2 Common



        13:36:01                                                    [facility]

 

        2022         Outpatient 3       [id number]  ENCPL   ORJ     [id number]

 [facility]



        10:14:56                                                 [id number]    [facility]



                                                                        Rehabil



                                                                        itation



                                                                        [place]



                                                                        d

 

        2022-03-10         Outpatient 3       [id number]  ENCPL   REF     [id number]

 [facility]



        08:56:23                                                 [id number]    



                                                                        Health



                                                                        Rehabil



                                                                        itation



                                                                        [place]



                                                                        d

 

        2022         Outpatient 3       [id number]  ENCPL   REF     [id number]

 [facility]



        15:01:57                                                 [id number]    



                                                                        Health



                                                                        Rehabil



                                                                        itation



                                                                        [place]



                                                                        d

 

        2022         Outpatient         Bales, Na STLMLC  STLMLC  [id number]

2 Common



        14:23:00                                                    [facility]

 

        2022-02-15         Outpatient         Bales, Na STLMLC  STLMLC  [id number]

2 Common



        08:54:02                                                    [facility]

 

        2022         Outpatient         Bales, Na STLMLC  STLMLC  [id number]

2 Common



        13:45:16                                                 [id number]   [facility]

 

        2022         Outpatient         Bales, Na STLMLC  STLMLC  [id number]

2 Common



        12:49:56                                                 [id number]   [facility]

 

        2022         Outpatient         Bales, Na STLMLC  STLMLC  [id number]

2 Common



        12:08:12                                                 [id number]   [facility]

 

        2022         Outpatient         Bales, Na STLMLC  STLMLC  [id number]

2 Common



        12:07:42                                                 [id number]   [facility]

 

        2022         Outpatient         Bales, Na STLMLC  STLMLC  [id number]

2 Common



        11:54:04                                                 [id number]   [facility]

 

        2022         Outpatient         Bales, Na STLMLC  STLMLC  [id number]

2 Common



        11:53:42                                                 [id number]   [facility]

 

        2022         Outpatient         Bales, Na STLMLC  STLMLC  [id number]

2 Common



        11:53:11                                                 [id number]   [facility]

 

        2022         Outpatient         Bales, Na STLMLC  STLMLC  [id number]

2 Common



        11:25:47                                                 [id number]   [facility]

 

        2023 Outpatient         KRYSTYNA NGUYEN  [id number]

64 Krystyna



        09:00:00 09:00:00                 DANYELL Shaverybol

ailyn

 

        2023 (TEL)                   STLMLC  STLMLC  [id number] Co

mmon



        00:00:00 00:00:00                                                 [facility]

 

        2023 (TEL)                   STLMLC  STLMLC  [id number] Co

mmon



        00:00:00 00:00:00                                                 [facility]

 

        2023 OL DIG E/M                 STLMLC  STLMLC  [id number]

 Common



        00:00:00 00:00:00 C 11-20                                         Spir

it



                        MIN                                             [facility]

 

        2023 (TEL)                   STLMLC  STLMLC  [id number] Co

mmon



        00:00:00 00:00:00                                                 [facility]

 

        2022 OFFICE                  STLMLC  STLMLC  [id number] Co

mmon



        00:00:00 00:00:00 VISIT EST                                         Spir

it



                        PT LEVEL 3                                         [facility]

 

        2022 (TEL)                   STLMLC  STLMLC  [id number] Co

mmon



        00:00:00 00:00:00                                                 [facility]

 

        2022 OFFICE                  STLMLC  STLMLC  [id number] Co

mmon



        00:00:00 00:00:00 VISIT EST                                         Spir

it



                        PT LEVEL 3                                         [facility]

 

        2022-10-06 2022-10-06 NON-BILLAB                 STLMLC  STLMLC  [id number]

 Common



        00:00:00 00:00:00 LE VISIT                                         [facility]

 

        2022 SUB ANNUAL                 STLMLC  STLMLC  [id number]

 Common



        00:00:00 00:00:00 MCR                                             [facility]



                        WELLNESS                                         - [facility]



                        VISIT                                           [facility]

 

        2022 NON-BILLAB                 STLMLC  STLMLC  [id number]

 Common



        00:00:00 00:00:00 LE VISIT                                         [facility]

 

        2022 OFFICE                  STLMLC  STLMLC  [id number] Co

mmon



        00:00:00 00:00:00 VISIT EST                                         Spir

it



                        PT LEVEL 3                                         - [facility]



                                                                        [facility]

 

        2022 (TEL)                   STLMLC  STLMLC  [id number] Co

mmon



        00:00:00 00:00:00                                                 [facility]

 

        2022-08-10 2022-08-10 (TEL)                   STLMLC  STLMLC  [id number] Co

mmon



        00:00:00 00:00:00                                                 [facility]

 

        2022 NON-BILLAB                 STLMLC  STLMLC  [id number]

 Common



        00:00:00 00:00:00 LE VISIT                                         Spiri

t



                                                                        [facility]

 

        2022-07-15 2022-07-15 (TEL)                   STLMLC  STLMLC  [id number] Co

mmon



        00:00:00 00:00:00                                                 [facility]

 

        2022 (TEL)                   STLMLC  STLMLC  [id number] Co

mmon



        00:00:00 00:00:00                                                 [facility]

 

        2022 OFFICE                  STLMLC  STLMLC  [id number] Co

mmon



        00:00:00 00:00:00 VISIT                                           [facility]



                        ESTAB PT                                         - [facility]



                        LEVEL 4                                         [facility]

 

        2022 Telephone         Keily,    [facility]    [phone]

6846 Univers



        00:00:00 00:00:00                 Hector .1.13.10         

ity of



                                                [place] [id number]         [place]

[place]



                                                PROFESSIO [phone]         Me

[facility]     [address]             Branch



                                                [facility]                 

 

        2022 Orders          Doctor  ADAL    1.2.840.114 [id number]

82 Univers



        00:00:00 00:00:00 Only            Unassigned, KENZIE   350.1.13.10       

  ity of



                                        [facility] [id number]         Cody

as



                                                        [phone]         Medi

reyna



                                                        [address]             Branch

 

        2022 (TEL)                   STLMLC  STLMLC  [id number] Co

mmon



        00:00:00 00:00:00                                                 [facility]

 

        2022 (TEL)                   STLMLC  STLMLC  [id number] Co

mmon



        00:00:00 00:00:00                                                 [facility]

 

        2022 NON-BILLAB                 STLMLC  STLMLC  [id number]

 Common



        00:00:00 00:00:00 LE VISIT                                         [facility]

 

        2022 OFFICE                  STLMLC  STLMLC  [id number] Co

mmon



        00:00:00 00:00:00 VISIT                                           [facility]



                        ESTAB PT                                         - [facility]



                        LEVEL 4                                         [facility]

 

        2022 NON-BILLAB                 STLMLC  STLMLC  [id number]

 Common



        00:00:00 00:00:00 LE VISIT                                         [facility]

 

        2022 (TEL)                   STLMLC  STLMLC  [id number] Co

mmon



        00:00:00 00:00:00                                                 [facility]

 

        2022 NON-BILLAB                 STLMLC  STLMLC  [id number]

 Common



        00:00:00 00:00:00 LE VISIT                                         [facility]

 

        2022 (TEL)                   STLMLC  STLMLC  [id number] Co

mmon



        00:00:00 00:00:00                                                 [facility]

 

        2022 (TEL)                   STLMLC  STLMLC  [id number] Co

mmon



        00:00:00 00:00:00                                                 [facility]

 

        2022 (TEL)                   STLMLC  STLMLC  [id number] Co

mmon



        00:00:00 00:00:00                                                 [facility]

 

        2022 Telephone         JANETT Michaud    [phone]

2382 Univers



        00:00:00 00:00:00                 [facility] 350.1.13.10         

[facility] marilee [facility] [id number]         Nick LYON [phone]         [address]                 

 

        2022 OFFICE                  STLMLC  STLMLC  [id number] Co

mmon



        00:00:00 00:00:00 VISIT                                           [facility]



                        ESTAB PT                                         - [facility]



                        LEVEL 4                                         [facility]

 

        2022 (TEL)                   STLMLC  STLMLC  [id number] Co

mmon



        00:00:00 00:00:00                                                 [facility]

 

        2022 (TEL)                   STLMLC  STLMLC  [id number] Co

mmon



        00:00:00 00:00:00                                                 [facility]

 

        2022 Orders          Doctor  ADAL    1.2.840.114 [id number]

96 Univers



        00:00:00 00:00:00 Only            Unassigned, KENZIE   350.1.13.10       

  ity 



                                        [facility] [id number]         [place]

as



                                                        [phone]         [address]

 

        2022 OFFICE                  STLMLC  STLMLC  [id number] Co

mmon



        00:00:00 00:00:00 VISIT EST                                         Spir

it



                        PT LEVEL 3                                         - [facility]

 

        2022 Orders          Doctor  ADAL    1.2.840.114 [id number]

82 Univers



        00:00:00 00:00:00 Only            Unassigned, KENZIE   350.1.13.10       

  ity of



                                        [facility] 4.2.7.2.686         [place]ody

as



                                                        [phone]         [address]

 

        2022 (TEL)                   STLMLC  STLMLC  [id number] Co

mmon



        00:00:00 00:00:00                                                 [facility]

 

        2022 (TEL)                   STLMLC  STLMLC  [id number] Co

mmon



        00:00:00 00:00:00                                                 [facility]

 

        2022 OL DIG E/M                 STLMLC  STLMLC  [id number]

 Common



        00:00:00 00:00:00 [facility] 11-20                                         Spir

it



                        MIN                                             [facility]

 

        2022 Outpatient R       KEILY,    [facility]    [id number]

463 Univers



        12:47:38 23:59:00                 HECTOR salazar



                                                                        [facility]

 

        2022 Hospital         [facility]    1.2.840.114 [id number]

367 Univers



        12:47:38 23:59:00 Encounter         Hector MANUELA 350.1.13.10       

  ity of



                                                [facility] 4.2.7.2.686         [place]

[place]



                                                PROFESSIO [phone]         Me

dical



                                                NAL     843             [facility]                 

 

        2022 Outpatient R       KEILY,    [facility]    [id number]

463 Univers



        13:00:00 13:00:00                 HECTOR shukla

[facility]

 

        2022 Outpatient R       KEILY,    [facility]    [id number]

463 Univers



        12:47:38 12:47:38                 HECTOR shukla

[facility]

 

        2021 Office          [facility]    1.2.840.114 [id number]

03 Univers



        14:40:00 15:22:14 Visit           Hector MANUELA 350.1.13.10         

ity of



                                                [facility] [id number]         [place]

[place]



                                                PROFESSIO [phone]         Me

dical



                                                NAL     059             [facility]                 

 

        2021 Outpatient R       KEILY,    [facility]    [id number]

312 Univers



        14:40:00 15:22:14                 HECTOR youngraj gayla

[facility]

 

        2021 Outpatient R       KEILY,    [facility]    [id number]

312 Univers



        14:40:00 15:22:14                 HECTOR youngraj o

[facility]

 

        2021 Orders          Doctor GALEAS    1.2.840.114 [id number]

41 Univers



        00:00:00 00:00:00 Only            Unassigned, KENZIE   350.1.13.10       

  ity of



                                        [place] [facility] [id number]         Cody

as



                                                        [phone]         [address]

 

        2021 Orders          Doctor GALEAS    1.2.840.114 [id number]

93 Univers



        00:00:00 00:00:00 Only            Unassigned, KENZIE   350.1.13.10       

  ity of



                                        [facility] 4.2.7.2.686         [place]ody

as



                                                        [phone]         Medi

reyna



                                                        [address]             Branch

 

        2021 (TEL)                   STLMLC  STLMLC  [id number] Co

mmon



        00:00:00 00:00:00                                                 [facility]

 

        2021 (TEL)                   STLMLC  STLMLC  [id number] Co

mmon



        00:00:00 00:00:00                                                 [facility]

 

        2021 (TEL)                   STLMLC  STLMLC  [id number] Co

mmon



        00:00:00 00:00:00                                                 [facility]

 

        2021 OFFICE                  STLMLC  STLMLC  [id number] Co

mmon



        00:00:00 00:00:00 VISIT                                           [facility]



                        ESTAB PT                                         - [facility]



                        LEVEL 4                                         [facility]

 

        2021 ([place]) Nurse                 STLMLC  STLMLC  [id number]

 Common



        00:00:00 00:00:00 Visit                                           [facility]

 

        2021 (TEL)                   STLMLC  STLMLC  [id number] Co

mmon



        00:00:00 00:00:00                                                 [facility]

 

        2021 (TEL)                   STLMLC  STLMLC  [id number] Co

mmon



        00:00:00 00:00:00                                                 [facility]

 

        2021 OFFICE                  STLMLC  STLMLC  [id number] Co

mmon



        00:00:00 00:00:00 VISIT EST                                         Spir

it



                        PT LEVEL 3                                         [facility]

 

        2021 Outpatient                 STLMLC  STLMLC  [id number]

 Common



        00:00:00 00:00:00                                                 [facility]

 

        2021 Outpatient                 STLMLC  STLMLC  [id number]

 Common



        00:00:00 00:00:00                                                 [facility]

 

        2020 Outpatient                 STLMLC  STLMLC  [id number]

 Common



        00:00:00 00:00:00                                                 [facility]

 

        2020-12-10 2020-12-10 Outpatient                 STLMLC  STLMLC  [id number]

 Common



        00:00:00 00:00:00                                                 [facility]

 

        2020 Outpatient                 STLMLC  STLMLC  [id number]

 Common



        00:00:00 00:00:00                                                 [facility]

 

        2020 Outpatient                 STLMLC  STLMLC  [id number]

 Common



        00:00:00 00:00:00                                                 [facility]

 

        2020-10-12 2020-10-12 Outpatient                 STLMLC  STLMLC  [id number]

 Common



        00:00:00 00:00:00                                                 [facility]

 

        2020-09-15 2020-09-15 Outpatient                 STLMLC  STLMLC  [id number]

 Common



        00:00:00 00:00:00                                                 [facility]

 

        2020-07-15 2020-07-15 Outpatient                 [facility]

[id number] Common



        08:41:00 08:41:00                         t [place]   [address]         Spir

it



                                                Drive   [facility]

 

        2020 Outpatient                 [facility]

[id number] Common



        11:00:00 11:00:00                         t [place]   [address]         Spir

it



                                                Drive   [facility]

 

        2020 Outpatient                 [facility]

[id number] Common



        16:26:00 16:26:00                         t [place]   [address]         Spir

it



                                                Drive   [facility]

 

        2020 Outpatient                 [facility]

[id number] Common



        16:01:00 16:01:00                         t [place]   [address]         Spir

it



                                                Drive   [facility]

 

        2020 Outpatient                 [facility]

[id number] Common



        10:20:00 10:20:00                         t [place]   [address]         Spir

it



                                                Drive   [facility]

 

        2020 Outpatient                 [facility]

[id number] Common



        10:15:00 10:15:00                         t [place]   [address]         Spir

it



                                                Drive   [facility]

 

        2020 Outpatient                 [facility]

[id number] Common



        10:40:00 10:40:00                         t [place]   [address]         Spir

it



                                                Drive   [facility]

 

        2020 Outpatient                 [facility]

[id number] Common



        11:20:00 11:20:00                         t [place]   [address]         Spir

it



                                                Drive   [facility]

 

        2020 Outpatient                 [facility]

[id number] Common



        11:45:00 11:45:00                         t [place]   [address]         Spir

it



                                                Drive   [facility]

 

        2020 Outpatient                 [facility]

[id number] Common



        10:23:00 10:23:00                         t [place]   [address]         Spir

it



                                                Drive   [facility]

 

        2020 Outpatient                 [facility]

[id number] Common



        15:38:00 15:38:00                         t Oak   [address]         Spir

it



                                                Drive   [facility]

 

        2020 Outpatient         [facility]     [id number]

[id number] [facility]



        07:23:00 07:23:00                 _A_AH                   [id number]   Family



                                                                        Practic



                                                                        e

 

        2020 Outpatient                 [facility]

[id number] Common



        14:22:00 14:22:00                         t       Specialty/U         Sp

naomi



                                                Specialty rology          - [facility]



                                                [facility]          [facility]

 

        2019 Outpatient                 [facility]

[id number] Common



        09:00:00 09:00:00                         t Oak   [address]         Spir

it



                                                Drive   [facility]

 

        2019 Outpatient                 [facility]

[id number] Common



        12:18:00 12:18:00                         t Oak   [address]         Spir

it



                                                Drive   [facility]

 

        2019 Outpatient                 [facility]

[id number] Common



        13:00:00 13:00:00                         t Oak   [address]         Spir

it



                                                Drive   [facility]

 

        2019 Outpatient                 [facility]

[id number] Common



        09:22:00 09:22:00                         t Pikimal         Spir

it



                                                Drive   [facility]

 

        2019 Outpatient                 [facility]

[id number] Common



        13:30:00 13:30:00                         t Bone  Bone and         Spiri

t



                                                and Joint Joint           - [facility]



                                                [facility]

 

        2019 Outpatient                 [facility]

[id number] Common



        14:00:00 14:00:00                         t Bone  Bone and         Spiri

t



                                                and Joint Joint           - [facility]



                                                [facility]

 

        2019 Outpatient                 [facility]

[id number] Common



        11:42:00 11:42:00                         t Bone  Bone and         Spiri

t



                                                and Joint Joint           - [facility]



                                                [facility]

 

        2019 Outpatient                 [facility]

[id number] Common



        11:00:00 11:00:00                         t Bone  Bone and         Spiri

t



                                                and Joint Joint           - [facility]



                                                [facility]

 

        2019-04-15 2019-04-15 Outpatient                 [facility]

[id number] Common



        16:04:00 16:04:00                         t Pikimal         Spir

it



                                                Drive   [facility]

 

        2019 Outpatient                 [facility]

[id number] Common



        15:41:00 15:41:00                         t Bone  Bone and         Spiri

t



                                                and Joint Joint           - [facility]



                                                [facility]

 

        2019 Outpatient                 [facility]

[id number] Common



        09:30:00 09:30:00                         t Bone  Bone and         Spiri

t



                                                [facility]



                                                [facility]

 

        2019 Outpatient                 [facility]

[id number] [address]



        14:19:00 14:19:00                         t Bone  B[facility]         Spiri

t



                                                [facility]           [facility]



                                                [facility]

 

        2019 Outpatient                 [facility]

[id number] [address]



        16:44:00 16:44:00                         t [place]         [facility]

[employer] [place]   [facility]







Results

This patient has no known results.

## 2023-02-17 NOTE — ER
Nurse's Notes                                                                                     

 [facility]                                                                 

Name: Lesly Dawson                                                                               

Age: 72 yrs                                                                                       

Sex: Female                                                                                       

: 1950                                                                                   

MRN: [id number]                                                                                   

Arrival Date: 2023                                                                          

Time: 10:42                                                                                       

Account#: [id number]                                                                            

Bed 14                                                                                            

Private MD:                                                                                       

Diagnosis: Acute upper respiratory infection, unspecified                                         

                                                                                                  

Presentation:                                                                                     

                                                                                             

11:01 Chief complaint: Patient states: SOB, chest congestion, productive cough and wheezing x ph  

      1 month, denies fever, N/V/D or pain, audible wheezing in triage. Coronavirus screen:       

      Vaccine status: Patient reports receiving the 2nd dose of the covid vaccine. Ebola          

      Screen: No symptoms or risks identified at this time. Initial Sepsis Screen: Does the       

      patient meet any 2 criteria? No. Patient's initial sepsis screen is negative. Does the      

      patient have a suspected source of infection? No. Patient's initial sepsis screen is        

      negative. Risk Assessment: Do you want to hurt yourself or someone else? Patient            

      reports no desire to harm self or others. Onset of symptoms was 2023.          

11:01 Method Of Arrival: Ambulatory                                                           ph  

11:01 Acuity: STACIE 3                                                                           ph  

                                                                                                  

Triage Assessment:                                                                                

11:04 General: Appears in no apparent distress. Behavior is calm, cooperative, appropriate    ph  

      for age, Denies fever, chills. Pain: Denies pain. Neuro: Level of Consciousness is          

      awake, alert, obeys commands, Oriented to person, place, time, situation. Respiratory:      

      Reports shortness of breath cough that is productive, Airway is patent Respiratory          

      effort is labored, Respiratory pattern is tachypnea.                                        

                                                                                                  

Historical:                                                                                       

- Allergies:                                                                                      

11:03 PENICILLINS;                                                                            ph  

- PMHx:                                                                                           

11:03 Hypertension;                                                                           ph  

- PSHx:                                                                                           

11:03 Appendectomy; Total abdominal hysterectomy; knee replacement, bilateral;                ph  

                                                                                                  

- Immunization history:: Adult Immunizations up to date.                                          

- Social history:: Smoking status: Patient/guardian denies using tobacco, but has a               

  distant history of tobacco abuse.                                                               

                                                                                                  

                                                                                                  

Screenin:33 Abuse screen: Denies threats or abuse. Nutritional screening: No deficits noted.        ap3 

      Tuberculosis screening: No symptoms or risk factors identified.                             

14:01 [facility] ED Fall Risk Assessment (Adult) History of falling in the last 3 months,       ap3 

      including since admission No falls in past 3 months (0 pts).                                

                                                                                                  

Vital Signs:                                                                                      

11:01  / 86; Pulse 84; Resp 22; Temp 97.8(O); Pulse Ox 98% on R/A; Weight 84.37 kg;     ph  

      Height 5 ft. 2 in. (157.48 cm);                                                             

11:54  / 57; Pulse 69; Pulse Ox 100% on R/A;                                            ap3 

12:38  / 57; Pulse 78; Pulse Ox 98% on R/A;                                             ap3 

11:01 Body Mass Index 34.02 (84.37 kg, 157.48 cm)                                             ph  

                                                                                                  

ED Course:                                                                                        

10:42 Patient arrived in ED.                                                                  as  

10:47 Jenifer Olivas PA-C is PHCP.                                                            sb4 

10:47 Adolfo Nuno MD is Attending Physician.                                            sb4 

11:03 Triage completed.                                                                       ph  

11:04 Arm band placed on Patient placed in an exam room, on a stretcher, on pulse oximetry.   ph  

11:07 Marissa Garcia, RN is Primary Nurse.                                                  ap3 

11:22 EKG done, by ED staff, COVID swab sent to lab. Flu and/or RSV swab sent to lab.         tm3 

11:32 Inserted saline lock: 20 gauge in right antecubital area, using aseptic technique.      ap3 

      Blood collected.                                                                            

11:33 Patient has correct armband on for positive identification. Bed in low position. Call   ap3 

      light in reach. Side rails up X2. Cardiac monitor on. Pulse ox on. NIBP on. Door            

      closed. Noise minimized.                                                                    

11:42 Chest Single View In Process Unspecified.                                               EDMS

12:51 Chest For PE Angio CT In Process Unspecified.                                           EDMS

13:45 Shakira Jc MD is Referral Physician.                                                     sb4 

14:00 No provider procedures requiring assistance completed. IV discontinued, intact,         ap3 

      bleeding controlled, No redness/swelling at site. Pressure dressing applied.                

                                                                                                  

Administered Medications:                                                                         

11:00 CANCELLED (Other Intervention Used): Albuterol 1.25 mg Inhalation once                  sb4 

11:00 CANCELLED (Other Intervention Used): AtroVENT (ipratropium) Aerosol 0.5 mg Inhalation   sb4 

      once; Every 20 min for a total of 3 treatments x3                                           

11:32 Drug: Albuterol - atroVENT (ipratropium) (3:1) (2.5 mg - 0.5 mg) 3 ml Route: Nebulizer; ap3 

14:02 Follow up: Response: No adverse reaction                                                ap3 

11:32 Drug: SOLU-Medrol (methylPrednisoLONE) 125 mg Route: IVP; Site: right antecubital;      ap3 

14:01 Follow up: Response: No adverse reaction                                                ap3 

11:32 Drug: Magnesium Sulfate 1 grams Route: IVPB; Infused Over: 1 hrs; Site: right           ap3 

      antecubital;                                                                                

14:01 Follow up: IV Status: Completed infusion; IV Intake: 100ml                              ap3 

                                                                                                  

                                                                                                  

Medication:                                                                                       

11:33 VIS not applicable for this client.                                                     ap3 

                                                                                                  

Intake:                                                                                           

14:01 IV: 100ml; Total: 100ml.                                                                ap3 

                                                                                                  

Outcome:                                                                                          

13:46 Discharge ordered by MD.                                                                sb4 

14:00 Discharged to home ambulatory.                                                          ap3 

14:00 Condition: good                                                                             

14:00 Discharge instructions given to patient, Instructed on discharge instructions, follow       

      up and referral plans. Demonstrated understanding of instructions, follow-up care,          

      medications, Prescriptions given X 3.                                                       

14:02 Patient left the ED.                                                                    ap3 

                                                                                                  

Signatures:                                                                                       

Dispatcher [employer]                           EDMS                                                 

Edward Decker                                tm3                                                  

Mariposa Seymour Patricia, RN RN ph Prokisch, Amanda, RN                    RN   ap3                                                  

Jenifer Olivas PA-C PA-C sb4                                                  

                                                                                                  

**************************************************************************************************

## 2023-03-10 ENCOUNTER — HOSPITAL ENCOUNTER (EMERGENCY)
Dept: HOSPITAL 97 - ER | Age: 73
Discharge: HOME | End: 2023-03-10
Payer: COMMERCIAL

## 2023-03-10 VITALS — SYSTOLIC BLOOD PRESSURE: 134 MMHG | DIASTOLIC BLOOD PRESSURE: 58 MMHG

## 2023-03-10 VITALS — OXYGEN SATURATION: 96 % | TEMPERATURE: 98.6 F

## 2023-03-10 DIAGNOSIS — I10: ICD-10-CM

## 2023-03-10 DIAGNOSIS — Z88.0: ICD-10-CM

## 2023-03-10 DIAGNOSIS — J45.40: ICD-10-CM

## 2023-03-10 DIAGNOSIS — J06.9: Primary | ICD-10-CM

## 2023-03-10 DIAGNOSIS — J44.1: ICD-10-CM

## 2023-03-10 LAB
ALBUMIN SERPL BCP-MCNC: 3.5 G/DL (ref 3.4–5)
ALP SERPL-CCNC: 51 U/L (ref 45–117)
ALT SERPL W P-5'-P-CCNC: 19 U/L (ref 13–56)
AST SERPL W P-5'-P-CCNC: 12 U/L (ref 15–37)
BUN BLD-MCNC: 17 MG/DL (ref 7–18)
GLUCOSE SERPLBLD-MCNC: 113 MG/DL (ref 74–106)
HCT VFR BLD CALC: 34.2 % (ref 36–45)
INR BLD: 1.02
LYMPHOCYTES # SPEC AUTO: 2.1 K/UL (ref 0.7–4.9)
MAGNESIUM SERPL-MCNC: 1.9 MG/DL (ref 1.6–2.4)
MCV RBC: 91.9 FL (ref 80–100)
NT-PROBNP SERPL-MCNC: 116 PG/ML (ref ?–125)
PMV BLD: 7.6 FL (ref 7.6–11.3)
POTASSIUM SERPL-SCNC: 3.6 MMOL/L (ref 3.5–5.1)
RBC # BLD: 3.72 M/UL (ref 3.86–4.86)
TROPONIN I SERPL HS-MCNC: 6.4 PG/ML (ref ?–58.9)

## 2023-03-10 PROCEDURE — 71045 X-RAY EXAM CHEST 1 VIEW: CPT

## 2023-03-10 PROCEDURE — 94640 AIRWAY INHALATION TREATMENT: CPT

## 2023-03-10 PROCEDURE — 96375 TX/PRO/DX INJ NEW DRUG ADDON: CPT

## 2023-03-10 PROCEDURE — 80076 HEPATIC FUNCTION PANEL: CPT

## 2023-03-10 PROCEDURE — 85610 PROTHROMBIN TIME: CPT

## 2023-03-10 PROCEDURE — 83880 ASSAY OF NATRIURETIC PEPTIDE: CPT

## 2023-03-10 PROCEDURE — 96365 THER/PROPH/DIAG IV INF INIT: CPT

## 2023-03-10 PROCEDURE — 93005 ELECTROCARDIOGRAM TRACING: CPT

## 2023-03-10 PROCEDURE — 85025 COMPLETE CBC W/AUTO DIFF WBC: CPT

## 2023-03-10 PROCEDURE — 80048 BASIC METABOLIC PNL TOTAL CA: CPT

## 2023-03-10 PROCEDURE — 99285 EMERGENCY DEPT VISIT HI MDM: CPT

## 2023-03-10 PROCEDURE — 36415 COLL VENOUS BLD VENIPUNCTURE: CPT

## 2023-03-10 PROCEDURE — 84484 ASSAY OF TROPONIN QUANT: CPT

## 2023-03-10 PROCEDURE — 83735 ASSAY OF MAGNESIUM: CPT

## 2023-03-10 NOTE — XMS REPORT
Continuity of Care Document

                            Created on:March 10, 2023



Patient:BRENT BARRERA

Sex:Female

:1950

External Reference #:[id number]





Demographics







                          Address                   [address] [place] [address]

 

                          Home Phone                [phone]

 

                          Work Phone                [phone]

 

                          Mobile Phone              [phone]

 

                          Email Address             [email or web address]

 

                          Preferred Language        English

 

                          Marital Status            Unknown

 

                          Nondenominational Affiliation     Unknown

 

                          Race                      Unknown

 

                          Additional Race(s)        Unavailable



                                                    White

 

                          Ethnic Group              Unknown









Author







                          Organization              [facility]

t

 

                          Address                   [address] [place] [address] [address] [place] [address]

 

                          Phone                     [phone]









Support







                Name            Relationship    Address         Phone

 

                DEANNA HO            Unavailable     [phone]

 

                Brent Barrera  Unavailable     [address]  [phone]



                                                [place] [address] 









Care Team Providers







                    Name                Role                Phone

 

                    Asked, No Pcp       Primary Care Physician Unavailable

 

                    Ilda Duvall      Attending Clinician Unavailable

 

                    Shakira BALES          Attending Clinician Unavailable

 

                    [id number]              Attending Clinician Unavailable

 

                    DANYELL NGUYEN        Attending Clinician Unavailable

 

                    Hector Michaud MD    Attending Clinician [phone]

 

                    Doctor Unassigned, No Name Attending Clinician Unavailable

 

                    HECTOR MICHAUD       Attending Clinician Unavailable

 

                    Vanessa-Mbayo_A_AH    Attending Clinician Unavailable

 

                    [id number]              Admitting Clinician Unavailable

 

                    HECTOR MICHAUD       Admitting Clinician Unavailable

 

                    Vanessa-Mbayo_A_AH    Admitting Clinician Unavailable









Payers







           Payer Name Policy Type Policy Number Effective Date Expiration Date S

garrett

 

           [facility] PPO 5          [id number]  2023            



                                            00:00:00              

 

           [place] [address]         [id number]  2019            [facility]

t



                                            00:00:00              - [place] [address]         [id number]  2019            [facility]

t



                                            00:00:00              - [place] [address]         [id number]  2019            [facility]

t



                                            00:00:00              - [place] [address]         [id number]  2019            [facility]

t



                                            00:00:00              - [place] [address]         [id number]  2019            [facility]

t



                                            00:00:00              - [place] [address]         [id number]  2019            [facility]



                                            00:00:00              - [facility]

 

           [employer] [place]            [id number]  2020            



           - [employer]                       00:00:00              



           (MEDICARE                                              



           REPLACEMENT/ADV                                             



           ANTAGE - HMO)                                             







Problems







       Condition Condition Condition Status Onset  Resolution Last   Treating Co

mments 

Source



       Name   Details Category        Date   Date   Treatment Clinician        



                                                 Date                 

 

       [id number] Right  Problem                                           Common



              sciatic                                                  [facility]



              nerve pain                                                  [facility]

 

       [id number] Primary Problem                                           Comm

on



         osteoarthr                                                  [facility]



              itis of                                                  - [facility]



              right knee                                                  [facility]

 

       [id number] Chronic Problem                                           Common



              fatigue                                                  [facility]

 

       [id number] Adult BMI Problem                                           Com

mon



              35.0-35.9                                                  [facility]irit



              kg/sq [facility]

 

       [id number] Seborrheic Problem                                           Co

mmon



              keratoses                                                  [facility]

 

       [id number] Pain,  Problem                                           Common



              joint,                                                  [facility]



              knee, left                                                  [facility]

 

       [id number] Primary Problem                                           Comm

on



         osteoarthr                                                  [facility]



              itis of                                                   [facility]



              left knee                                                  [facility]

 

       [id number] Stress and Problem                                           Co

mmon



              adjustment                                                  [facility]



              reaction                                                  [facility]

 

       [id number] Presence Problem                                           Com

     of right                                                  [facility]



              artificial                                                  - [facility]



              knee joint                                                  [facility]

 

       [id number] Other  Problem                                           Common



              chronic                                                  [facility]



              pain                                                    [facility]

 

       [id number] Arthritis Problem                                           Co

mmon



       363197 of left                                                  [facility]



              knee                                                    [facility]

 

       [id number] Status Problem                                           Commo

n



       105    post total                                                  [facility]



              right knee                                                  - [facility]



              [facility]

 

       Bilateral Degenerati Problem                                           Co

mmon



       arthritis ve                                                      [facility]



       of knees arthritis                                                  - [facility]



              of knee,                                                  [facility]

 

       Essential Benign Problem                                           Common



       hypertensi essential                                                  Spi

rit



       on     HTN                                                     [facility]

 

       Obese  Obese  Problem                                           Common



                                                                      [facility]

 

       Gastroesop GERD   Problem                                           Commo

n



       hageal (gastroeso                                                  [facility]



       [facility]                                                  [facility]



       disease reflux                                                  St



              disease)                                                  [facility]

 

       Hyperlipid Hyperlipid Problem                                           C

ommon



       emia   emia                                                    [facility]

 

       [id number] Seasonal Problem                                           Comm

on



              allergic                                                  [facility]



              rhinitis,                                                  - [facility]



              unspecifie                                                  [facility]

 

       Metabolic Metabolic Problem                                           Com

mon



       syndrome X syndrome X                                                  Sp

naomi



                                                                      [facility]

 

       [id number] Aftercare Problem                                           Com

mon



              following                                                  [facility]



              joint                                                   - [facility]



              [facility]

 

       No known No known Disease                                           [facility]

di



       active active                                                  st



       problems problems                                                  Hospit

a



                                                                      l







Allergies, Adverse Reactions, Alerts







       Allergy Allergy Status Severity Reaction(s) Onset  Inactive Treating Comm

ents 

Source



       Name   Type                        Date   Date   Clinician        

 

       Mariano Johnsi Active        Rash   2021                      Univer

s



       in     ty to                                               ity of



              adverse                      00:00:                      [place]



              reaction                      00                          Medical



              s                                                       [facility]

 

       [facility] Prop[facility]i Active                                     Method

i



       ins    ty to                       8                        st



              adverse                      00:00:                      Hospita



              reaction                      00                          l



              s to                                                    



              drug                                                    







Social History







           Social Habit Start Date Stop Date  Quantity   Comments   Source

 

           Exposure to                       Not sure              [facility]



           SARS-CoV-2 (event)                                             [facility]

 

           History of Tobacco                                             Common

 [facility] -



           Use                                                    [facility]

 

           Tobacco use and 2021 Never used            Univers[facility]

y of



           exposure   00:00:00   00:00:00                         [facility]

 

           Cigarettes smoked 2017                       [facility]

st



           current (pack per 00:00:00   00:00:00                         Hospita

l



           day) - Reported                                             

 

           Cigarette  2017                       [facility]



           pack-years 00:00:00   00:00:00                         Hospital

 

           Alcohol intake 2017 Current               [facility]



                      00:00:00   00:00:00   non-drinker of            Hospital



                                            alcohol               



                                            (finding)             

 

           Sex Assigned At [birth year]                       [facility]



           Birth      00:00:00   00:00:00                         Hospital









                Smoking Status  Start Date      Stop Date       Source

 

                Unknown if ever smoked                                 [facility]

y [facility]

 

                Former Smoker   2023 00:00:00 2023 00:00:00 Common S

[facility]

nter







Medications







       Ordered Filled Start  Stop   Current Ordering Indication Dosage Frequency

 Signature

                    Comments            Components          Source



     Medication Medication Date Date Medication? Clinician                (SIG) 

          



     Name Name                                                   

 

     methylPREDN methylPREDN 2023- No                  QD   methylPRED   

        



     ISolone 4 ISolone 4                           NISolone 4         

  



     MG   MG   00:00: 00:00                          MG             



               00   :00                                          

 

     methylPREDN methylPREDN 2023- No                  QD   methylPRED   

        



     ISolone 4 ISolone 4                           NISolone 4         

  



     MG   MG   00:00: 00:00                          MG             



               00   :00                                          

 

     HYDROcodone HYDROcodone       No             1{table      HYDROcodon 

          



     -Acetaminop -Acetaminop 8-11                     t_as_ne      e-Acetamin   

        



     hen 5-325 hen 5-325 00:00:                     eded}      ophen           



     MG   MG   00                                 5-325 MG           

 

     HYDROcodone HYDROcodone -0      No             1{table      HYDROcodon 

          



     -Acetaminop -Acetaminop 8-11                     t_as_ne      e-Acetamin   

        



     hen 5-325 hen 5-325 00:00:                     eded}      ophen           



     MG   MG   00                                 5-325 MG           

 

     HYDROcodone HYDROcodone -0      No             1{table      HYDROcodon 

          



     -Acetaminop -Acetaminop 8-11                     t_as_ne      e-Acetamin   

        



     hen 5-325 hen 5-325 00:00:                     eded}      ophen           



     MG   MG   00                                 5-325 MG           

 

     HYDROcodone HYDROcodone -0      No             1{table      HYDROcodon 

          



     -Acetaminop -Acetaminop 8-11                     t_as_ne      e-Acetamin   

        



     hen 5-325 hen 5-325 00:00:                     eded}      ophen           



     MG   MG   00                                 5-325 MG           

 

     HYDROcodone HYDROcodone -0      No             1{table      HYDROcodon 

          



     -Acetaminop -Acetaminop 8-11                     t_as_ne      e-Acetamin   

        



     hen 5-325 hen 5-325 00:00:                     eded}      ophen           



     MG   MG   00                                 5-325 MG           

 

     HYDROcodone HYDROcodone -0      No             1{table      HYDROcodon 

          



     -Acetaminop -Acetaminop 8-11                     t_as_ne      e-Acetamin   

        



     hen 5-325 hen 5-325 00:00:                     eded}      ophen           



     MG   MG   00                                 5-325 MG           

 

     HYDROcodone HYDROcodone -0      No             1{table      HYDROcodon 

          



     -Acetaminop -Acetaminop 8-11                     t_as_ne      e-Acetamin   

        



     hen 5-325 hen 5-325 00:00:                     eded}      ophen           



     MG   MG   00                                 5-325 MG           

 

     HYDROcodone HYDROcodone -0      No             1{table      HYDROcodon 

          



     -Acetaminop -Acetaminop 8-11                     t_as_ne      e-Acetamin   

        



     hen 5-325 hen 5-325 00:00:                     eded}      ophen           



     MG   MG   00                                 5-325 MG           

 

     HYDROcodone HYDROcodone -0      No             1{table      HYDROcodon 

          



     -Acetaminop -Acetaminop 8-11                     t_as_ne      e-Acetamin   

        



     hen 5-325 hen 5-325 00:00:                     eded}      ophen           



     MG   MG   00                                 5-325 MG           

 

     HYDROcodone HYDROcodone       No             1{table      HYDROcodon 

          



     -Acetaminop -Acetaminop 8-11                     t_as_ne      e-Acetamin   

        



     hen 5-325 hen 5-325 00:00:                     eded}      ophen           



     MG   MG   00                                 5-325 MG           

 

     HYDROcodone HYDROcodone       No             1{table      HYDROcodon 

          



     -Acetaminop -Acetaminop 8-11                     t_as_ne      e-Acetamin   

        



     hen 5-325 hen 5-325 00:00:                     eded}      ophen           



     MG   MG   00                                 5-325 MG           

 

     HYDROcodone HYDROcodone       No             1{table      HYDROcodon 

          



     -Acetaminop -Acetaminop 8-11                     t_as_ne      e-Acetamin   

        



     hen 5-325 hen 5-325 00:00:                     eded}      ophen           



     MG   MG   00                                 5-325 MG           

 

     HYDROcodone HYDROcodone       No             1{table      HYDROcodon 

          



     -Acetaminop -Acetaminop 7-17                     t_as_ne      e-Acetamin   

        



     hen 7.5-325 hen 7.5-325 00:00:                     eded}      ophen        

   



     MG   MG   00                                 7.5-325 MG           

 

     HYDROcodone HYDROcodone       No             1{table      HYDROcodon 

          



     -Acetaminop -Acetaminop 7-17                     t_as_ne      e-Acetamin   

        



     hen 7.5-325 hen 7.5-325 00:00:                     eded}      ophen        

   



     MG   MG   00                                 7.5-325 MG           

 

     HYDROcodone HYDROcodone       No             1{table      HYDROcodon 

          



     -Acetaminop -Acetaminop 7-17                     t_as_ne      e-Acetamin   

        



     hen 7.5-325 hen 7.5-325 00:00:                     eded}      ophen        

   



     MG   MG   00                                 7.5-325 MG           

 

     HYDROcodone HYDROcodone       No             1{table      HYDROcodon 

          



     -Acetaminop -Acetaminop 7-17                     t_as_ne      e-Acetamin   

        



     hen 7.5-325 hen 7.5-325 00:00:                     eded}      ophen        

   



     MG   MG   00                                 7.5-325 MG           

 

     HYDROcodone HYDROcodone       No             1{table      HYDROcodon 

          



     -Acetaminop -Acetaminop 7-17                     t_as_ne      e-Acetamin   

        



     hen 7.5-325 hen 7.5-325 00:00:                     eded}      ophen        

   



     MG   MG   00                                 7.5-325 MG           

 

     HYDROcodone HYDROcodone       No             1{table      HYDROcodon 

          



     -Acetaminop -Acetaminop 7-17                     t_as_ne      e-Acetamin   

        



     hen 7.5-325 hen 7.5-325 00:00:                     eded}      ophen        

   



     MG   MG   00                                 7.5-325 MG           

 

     HYDROcodone HYDROcodone -0      No             1{table      HYDROcodon 

          



     -Acetaminop -Acetaminop 7-17                     t_as_ne      e-Acetamin   

        



     hen 7.5-325 hen 7.5-325 00:00:                     eded}      ophen        

   



     MG   MG   00                                 7.5-325 MG           

 

     HYDROcodone HYDROcodone 0      No             1{table      HYDROcodon 

          



     -Acetaminop -Acetaminop 7-17                     t_as_ne      e-Acetamin   

        



     hen 7.5-325 hen 7.5-325 00:00:                     eded}      ophen        

   



     MG   MG   00                                 7.5-325 MG           

 

     HYDROcodone HYDROcodone 0      No             1{table      HYDROcodon 

          



     -Acetaminop -Acetaminop 7-17                     t_as_ne      e-Acetamin   

        



     hen 7.5-325 hen 7.5-325 00:00:                     eded}      ophen        

   



     MG   MG   00                                 7.5-325 MG           

 

     HYDROcodone HYDROcodone -0      No             1{table      HYDROcodon 

          



     -Acetaminop -Acetaminop 7-17                     t_as_ne      e-Acetamin   

        



     hen 7.5-325 hen 7.5-325 00:00:                     eded}      ophen        

   



     MG   MG   00                                 7.5-325 MG           

 

     HYDROcodone HYDROcodone -0      No             1{table      HYDROcodon 

          



     -Acetaminop -Acetaminop 7-17                     t_as_ne      e-Acetamin   

        



     hen 7.5-325 hen 7.5-325 00:00:                     eded}      ophen        

   



     MG   MG   00                                 7.5-325 MG           

 

     HYDROcodone HYDROcodone -0      No             1{table      HYDROcodon 

          



     -Acetaminop -Acetaminop 7-17                     t_as_ne      e-Acetamin   

        



     hen 7.5-325 hen 7.5-325 00:00:                     eded}      ophen        

   



     MG   MG   00                                 7.5-325 MG           

 

     HYDROcodone HYDROcodone -0      No             1{table      HYDROcodon 

          



     -Acetaminop -Acetaminop 7-17                     t_as_ne      e-Acetamin   

        



     hen 7.5-325 hen 7.5-325 00:00:                     eded}      ophen        

   



     MG   MG   00                                 7.5-325 MG           

 

     HYDROcodone HYDROcodone -0      No             1{table      HYDROcodon 

          



     -Acetaminop -Acetaminop 7-17                     t_as_ne      e-Acetamin   

        



     hen 7.5-325 hen 7.5-325 00:00:                     eded}      ophen        

   



     MG   MG   00                                 7.5-325 MG           

 

     HYDROcodone HYDROcodone -0      No             1{table      HYDROcodon 

          



     -Acetaminop -Acetaminop 7-17                     t_as_ne      e-Acetamin   

        



     hen 7.5-325 hen 7.5-325 00:00:                     eded}      ophen        

   



     MG   MG   00                                 7.5-325 MG           

 

     HYDROcodone HYDROcodone -0      No             1{table      HYDROcodon 

          



     -Acetaminop -Acetaminop 6-30                     t_as_ne      e-Acetamin   

        



     hen 7.5-325 hen 7.5-325 00:00:                     eded}      ophen        

   



     MG   MG   00                                 7.5-325 MG           

 

     Xarelto 10 Xarelto 10 -0      No             1{table QD   Xarelto 10   

        



     MG   MG   6-30                     t}        MG             



               00:00:                                              



               00                                                

 

     HYDROcodone HYDROcodone -0      No             1{table      HYDROcodon 

          



     -Acetaminop -Acetaminop 6-30                     t_as_ne      e-Acetamin   

        



     hen 7.5-325 hen 7.5-325 00:00:                     eded}      ophen        

   



     MG   MG   00                                 7.5-325 MG           

 

     Xarelto 10 Xarelto 10 -0      No             1{table QD   Xarelto 10   

        



     MG   MG   6-30                     t}        MG             



               00:00:                                              



               00                                                

 

     HYDROcodone HYDROcodone -0      No             1{table      HYDROcodon 

          



     -Acetaminop -Acetaminop 6-30                     t_as_ne      e-Acetamin   

        



     hen 7.5-325 hen 7.5-325 00:00:                     eded}      ophen        

   



     MG   MG   00                                 7.5-325 MG           

 

     Xarelto 10 Xarelto 10 -0      No             1{table QD   Xarelto 10   

        



     MG   MG   6-30                     t}        MG             



               00:00:                                              



               00                                                

 

     HYDROcodone HYDROcodone -0      No             1{table      HYDROcodon 

          



     -Acetaminop -Acetaminop 6-30                     t_as_ne      e-Acetamin   

        



     hen 7.5-325 hen 7.5-325 00:00:                     eded}      ophen        

   



     MG   MG   00                                 7.5-325 MG           

 

     Xarelto 10 Xarelto 10 -0      No             1{table QD   Xarelto 10   

        



     MG   MG   6-30                     t}        MG             



               00:00:                                              



               00                                                

 

     HYDROcodone HYDROcodone -0      No             1{table      HYDROcodon 

          



     -Acetaminop -Acetaminop 6-30                     t_as_ne      e-Acetamin   

        



     hen 7.5-325 hen 7.5-325 00:00:                     eded}      ophen        

   



     MG   MG   00                                 7.5-325 MG           

 

     Xarelto 10 Xarelto 10 -0      No             1{table QD   Xarelto 10   

        



     MG   MG   6-30                     t}        MG             



               00:00:                                              



               00                                                

 

     HYDROcodone HYDROcodone -0      No             1{table      HYDROcodon 

          



     -Acetaminop -Acetaminop 6-30                     t_as_ne      e-Acetamin   

        



     hen 7.5-325 hen 7.5-325 00:00:                     eded}      ophen        

   



     MG   MG   00                                 7.5-325 MG           

 

     Xarelto 10 Xarelto 10 -0      No             1{table QD   Xarelto 10   

        



     MG   MG   6-30                     t}        MG             



               00:00:                                              



               00                                                

 

     Xarelto 10 Xarelto 10 -0      No             1{table QD   Xarelto 10   

        



     MG   MG   6-30                     t}        MG             



               00:00:                                              



               00                                                

 

     HYDROcodone HYDROcodone -0      No             1{table      HYDROcodon 

          



     -Acetaminop -Acetaminop 6-30                     t_as_ne      e-Acetamin   

        



     hen 7.5-325 hen 7.5-325 00:00:                     eded}      ophen        

   



     MG   MG   00                                 7.5-325 MG           

 

     Xarelto 10 Xarelto 10 -0      No             1{table QD   Xarelto 10   

        



     MG   MG   6-30                     t}        MG             



               00:00:                                              



               00                                                

 

     HYDROcodone HYDROcodone -0      No             1{table      HYDROcodon 

          



     -Acetaminop -Acetaminop 6-30                     t_as_ne      e-Acetamin   

        



     hen 7.5-325 hen 7.5-325 00:00:                     eded}      ophen        

   



     MG   MG   00                                 7.5-325 MG           

 

     Xarelto 10 Xarelto 10 -0      No             1{table QD   Xarelto 10   

        



     MG   MG   6-30                     t}        MG             



               00:00:                                              



               00                                                

 

     HYDROcodone HYDROcodone -0      No             1{table      HYDROcodon 

          



     -Acetaminop -Acetaminop 6-30                     t_as_ne      e-Acetamin   

        



     hen 7.5-325 hen 7.5-325 00:00:                     eded}      ophen        

   



     MG   MG   00                                 7.5-325 MG           

 

     HYDROcodone HYDROcodone -0      No             1{table      HYDROcodon 

          



     -Acetaminop -Acetaminop 6-30                     t_as_ne      e-Acetamin   

        



     hen 7.5-325 hen 7.5-325 00:00:                     eded}      ophen        

   



     MG   MG   00                                 7.5-325 MG           

 

     Xarelto 10 Xarelto 10 -0      No             1{table QD   Xarelto 10   

        



     MG   MG   6-30                     t}        MG             



               00:00:                                              



               00                                                

 

     Xarelto 10 Xarelto 10 -0      No             1{table QD   Xarelto 10   

        



     MG   MG   6-30                     t}        MG             



               00:00:                                              



               00                                                

 

     HYDROcodone HYDROcodone -0      No             1{table      HYDROcodon 

          



     -Acetaminop -Acetaminop 6-30                     t_as_ne      e-Acetamin   

        



     hen 7.5-325 hen 7.5-325 00:00:                     eded}      ophen        

   



     MG   MG   00                                 7.5-325 MG           

 

     Xarelto 10 Xarelto 10 -0      No             1{table QD   Xarelto 10   

        



     MG   MG   6-30                     t}        MG             



               00:00:                                              



               00                                                

 

     HYDROcodone HYDROcodone -0      No             1{table      HYDROcodon 

          



     -Acetaminop -Acetaminop 6-30                     t_as_ne      e-Acetamin   

        



     hen 7.5-325 hen 7.5-325 00:00:                     eded}      ophen        

   



     MG   MG   00                                 7.5-325 MG           

 

     Xarelto 10 Xarelto 10 -0      No             1{table QD   Xarelto 10   

        



     MG   MG   6-30                     t}        MG             



               00:00:                                              



               00                                                

 

     HYDROcodone HYDROcodone -0      No             1{table      HYDROcodon 

          



     -Acetaminop -Acetaminop 6-30                     t_as_ne      e-Acetamin   

        



     hen 7.5-325 hen 7.5-325 00:00:                     eded}      ophen        

   



     MG   MG   00                                 7.5-325 MG           

 

     Xarelto 10 Xarelto 10 -0      No             1{table QD   Xarelto 10   

        



     MG   MG   6-30                     t}        MG             



               00:00:                                              



               00                                                

 

     HYDROcodone HYDROcodone -0      No             1{table      HYDROcodon 

          



     -Acetaminop -Acetaminop 6-30                     t_as_ne      e-Acetamin   

        



     hen 7.5-325 hen 7.5-325 00:00:                     eded}      ophen        

   



     MG   MG   00                                 7.5-325 MG           

 

     Xarelto 10 Xarelto 10 -0      No             1{table QD   Xarelto 10   

        



     MG   MG   6-30                     t}        MG             



               00:00:                                              



               00                                                

 

     HYDROcodone HYDROcodone -0      No             1{table      HYDROcodon 

          



     -Acetaminop -Acetaminop 6-30                     t_as_ne      e-Acetamin   

        



     hen 7.5-325 hen 7.5-325 00:00:                     eded}      ophen        

   



     MG   MG   00                                 7.5-325 MG           

 

     Xarelto 10 Xarelto 10 -0      No             1{table QD   Xarelto 10   

        



     MG   MG   6-30                     t}        MG             



               00:00:                                              



               00                                                

 

     HYDROcodone HYDROcodone -0      No             1{table      HYDROcodon 

          



     -Acetaminop -Acetaminop 6-30                     t_as_ne      e-Acetamin   

        



     hen 7.5-325 hen 7.5-325 00:00:                     eded}      ophen        

   



     MG   MG   00                                 7.5-325 MG           

 

     Xarelto 10 Xarelto 10 -0      No             1{table QD   Xarelto 10   

        



     MG   MG   6-30                     t}        MG             



               00:00:                                              



               00                                                

 

     HYDROcodone HYDROcodone -0      No             1{table      HYDROcodon 

          



     -Acetaminop -Acetaminop 6-30                     t_as_ne      e-Acetamin   

        



     hen 7.5-325 hen 7.5-325 00:00:                     eded}      ophen        

   



     MG   MG   00                                 7.5-325 MG           

 

     HYDROcodone HYDROcodone -0      No             1{table      HYDROcodon 

          



     -Acetaminop -Acetaminop 3-07                     t_as_ne      e-Acetamin   

        



     hen 7.5-325 hen 7.5-325 00:00:                     eded}      ophen        

   



     MG   MG   00                                 7.5-325 MG           

 

     Xarelto 10 Xarelto 10 -0      No             1{table QD   Xarelto 10   

        



     MG   MG   3-07                     t}        MG             



               00:00:                                              



               00                                                

 

     HYDROcodone HYDROcodone -0      No             1{table      HYDROcodon 

          



     -Acetaminop -Acetaminop 3-07                     t_as_ne      e-Acetamin   

        



     hen 7.5-325 hen 7.5-325 00:00:                     eded}      ophen        

   



     MG   MG   00                                 7.5-325 MG           

 

     Xarelto 10 Xarelto 10 -0      No             1{table QD   Xarelto 10   

        



     MG   MG   3-07                     t}        MG             



               00:00:                                              



               00                                                

 

     HYDROcodone HYDROcodone -0      No             1{table      HYDROcodon 

          



     -Acetaminop -Acetaminop 3-07                     t_as_ne      e-Acetamin   

        



     hen 7.5-325 hen 7.5-325 00:00:                     eded}      ophen        

   



     MG   MG   00                                 7.5-325 MG           

 

     Xarelto 10 Xarelto 10 -0      No             1{table QD   Xarelto 10   

        



     MG   MG   3-07                     t}        MG             



               00:00:                                              



               00                                                

 

     Xarelto 10 Xarelto 10 -0      No             1{table QD   Xarelto 10   

        



     MG   MG   3-07                     t}        MG             



               00:00:                                              



               00                                                

 

     HYDROcodone HYDROcodone -0      No             1{table      HYDROcodon 

          



     -Acetaminop -Acetaminop 3-07                     t_as_ne      e-Acetamin   

        



     hen 7.5-325 hen 7.5-325 00:00:                     eded}      ophen        

   



     MG   MG   00                                 7.5-325 MG           

 

     Xarelto 10 Xarelto 10 -0      No             1{table QD   Xarelto 10   

        



     MG   MG   3-07                     t}        MG             



               00:00:                                              



               00                                                

 

     HYDROcodone HYDROcodone -0      No             1{table      HYDROcodon 

          



     -Acetaminop -Acetaminop 3-07                     t_as_ne      e-Acetamin   

        



     hen 7.5-325 hen 7.5-325 00:00:                     eded}      ophen        

   



     MG   MG   00                                 7.5-325 MG           

 

     Xarelto 10 Xarelto 10 -0      No             1{table QD   Xarelto 10   

        



     MG   MG   3-07                     t}        MG             



               00:00:                                              



               00                                                

 

     HYDROcodone HYDROcodone -0      No             1{table      HYDROcodon 

          



     -Acetaminop -Acetaminop 3-07                     t_as_ne      e-Acetamin   

        



     hen 7.5-325 hen 7.5-325 00:00:                     eded}      ophen        

   



     MG   MG   00                                 7.5-325 MG           

 

     HYDROcodone HYDROcodone 2-0      No             1{table      HYDROcodon 

          



     -Acetaminop -Acetaminop 3-07                     t_as_ne      e-Acetamin   

        



     hen 7.5-325 hen 7.5-325 00:00:                     eded}      ophen        

   



     MG   MG   00                                 7.5-325 MG           

 

     Xarelto 10 Xarelto 10 -0      No             1{table QD   Xarelto 10   

        



     MG   MG   3-07                     t}        MG             



               00:00:                                              



               00                                                

 

     HYDROcodone HYDROcodone -0      No             1{table      HYDROcodon 

          



     -Acetaminop -Acetaminop 3-07                     t_as_ne      e-Acetamin   

        



     hen 7.5-325 hen 7.5-325 00:00:                     eded}      ophen        

   



     MG   MG   00                                 7.5-325 MG           

 

     Xarelto 10 Xarelto 10 -0      No             1{table QD   Xarelto 10   

        



     MG   MG   3-07                     t}        MG             



               00:00:                                              



               00                                                

 

     HYDROcodone HYDROcodone -0      No             1{table      HYDROcodon 

          



     -Acetaminop -Acetaminop 3-07                     t_as_ne      e-Acetamin   

        



     hen 7.5-325 hen 7.5-325 00:00:                     eded}      ophen        

   



     MG   MG   00                                 7.5-325 MG           

 

     Xarelto 10 Xarelto 10 -0      No             1{table QD   Xarelto 10   

        



     MG   MG   3-07                     t}        MG             



               00:00:                                              



               00                                                

 

     Xarelto 10 Xarelto 10 -0      No             1{table QD   Xarelto 10   

        



     MG   MG   3-07                     t}        MG             



               00:00:                                              



               00                                                

 

     HYDROcodone HYDROcodone -0      No             1{table      HYDROcodon 

          



     -Acetaminop -Acetaminop 3-07                     t_as_ne      e-Acetamin   

        



     hen 7.5-325 hen 7.5-325 00:00:                     eded}      ophen        

   



     MG   MG   00                                 7.5-325 MG           

 

     Xarelto 10 Xarelto 10 -0      No             1{table QD   Xarelto 10   

        



     MG   MG   3-07                     t}        MG             



               00:00:                                              



               00                                                

 

     HYDROcodone HYDROcodone -0      No             1{table      HYDROcodon 

          



     -Acetaminop -Acetaminop 3-07                     t_as_ne      e-Acetamin   

        



     hen 7.5-325 hen 7.5-325 00:00:                     eded}      ophen        

   



     MG   MG   00                                 7.5-325 MG           

 

     Xarelto 10 Xarelto 10 -0      No             1{table QD   Xarelto 10   

        



     MG   MG   3-07                     t}        MG             



               00:00:                                              



               00                                                

 

     HYDROcodone HYDROcodone -0      No             1{table      HYDROcodon 

          



     -Acetaminop -Acetaminop 3-07                     t_as_ne      e-Acetamin   

        



     hen 7.5-325 hen 7.5-325 00:00:                     eded}      ophen        

   



     MG   MG   00                                 7.5-325 MG           

 

     Xarelto 10 Xarelto 10 -0      No             1{table QD   Xarelto 10   

        



     MG   MG   3-07                     t}        MG             



               00:00:                                              



               00                                                

 

     HYDROcodone HYDROcodone -0      No             1{table      HYDROcodon 

          



     -Acetaminop -Acetaminop 3-07                     t_as_ne      e-Acetamin   

        



     hen 7.5-325 hen 7.5-325 00:00:                     eded}      ophen        

   



     MG   MG   00                                 7.5-325 MG           

 

     Xarelto 10 Xarelto 10 -0      No             1{table QD   Xarelto 10   

        



     MG   MG   3-07                     t}        MG             



               00:00:                                              



               00                                                

 

     HYDROcodone HYDROcodone -0      No             1{table      HYDROcodon 

          



     -Acetaminop -Acetaminop 3-07                     t_as_ne      e-Acetamin   

        



     hen 7.5-325 hen 7.5-325 00:00:                     eded}      ophen        

   



     MG   MG   00                                 7.5-325 MG           

 

     Xarelto 10 Xarelto 10 -0      No             1{table QD   Xarelto 10   

        



     MG   MG   3-07                     t}        MG             



               00:00:                                              



               00                                                

 

     HYDROcodone HYDROcodone -0      No             1{table      HYDROcodon 

          



     -Acetaminop -Acetaminop 3-07                     t_as_ne      e-Acetamin   

        



     hen 7.5-325 hen 7.5-325 00:00:                     eded}      ophen        

   



     MG   MG   00                                 7.5-325 MG           

 

     Xarelto 10 Xarelto 10 -0      No             1{table QD   Xarelto 10   

        



     MG   MG   3-07                     t}        MG             



               00:00:                                              



               00                                                

 

     HYDROcodone HYDROcodone -0      No             1{table      HYDROcodon 

          



     -Acetaminop -Acetaminop 3-07                     t_as_ne      e-Acetamin   

        



     hen 7.5-325 hen 7.5-325 00:00:                     eded}      ophen        

   



     MG   MG   00                                 7.5-325 MG           

 

     Xarelto 10 Xarelto 10 -0      No             1{table QD   Xarelto 10   

        



     MG   MG   3-07                     t}        MG             



               00:00:                                              



               00                                                

 

     HYDROcodone HYDROcodone -0      No             1{table      HYDROcodon 

          



     -Acetaminop -Acetaminop 3-07                     t_as_ne      e-Acetamin   

        



     hen 7.5-325 hen 7.5-325 00:00:                     eded}      ophen        

   



     MG   MG   00                                 7.5-325 MG           

 

     HYDROcodone HYDROcodone -0      No             1{table      HYDROcodon 

          



     -Acetaminop -Acetaminop 3-07                     t_as_ne      e-Acetamin   

        



     hen 7.5-325 hen 7.5-325 00:00:                     eded}      ophen        

   



     MG   MG   00                                 7.5-325 MG           

 

     Xarelto 10 Xarelto 10 -0      No             1{table QD   Xarelto 10   

        



     MG   MG   3-07                     t}        MG             



               00:00:                                              



               00                                                

 

     HYDROcodone HYDROcodone -0      No             1{table      HYDROcodon 

          



     -Acetaminop -Acetaminop 3-07                     t_as_ne      e-Acetamin   

        



     hen 7.5-325 hen 7.5-325 00:00:                     eded}      ophen        

   



     MG   MG   00                                 7.5-325 MG           

 

     Xarelto 10 Xarelto 10 -0      No             1{table QD   Xarelto 10   

        



     MG   MG   3-07                     t}        MG             



               00:00:                                              



               00                                                

 

     HYDROcodone HYDROcodone -0      No             1{table      HYDROcodon 

          



     -Acetaminop -Acetaminop 3-07                     t_as_ne      e-Acetamin   

        



     hen 7.5-325 hen 7.5-325 00:00:                     eded}      ophen        

   



     MG   MG   00                                 7.5-325 MG           

 

     Xarelto 10 Xarelto 10 -0      No             1{table QD   Xarelto 10   

        



     MG   MG   3-07                     t}        MG             



               00:00:                                              



               00                                                

 

     HYDROcodone HYDROcodone -0      No             1{table      HYDROcodon 

          



     -Acetaminop -Acetaminop 3-07                     t_as_ne      e-Acetamin   

        



     hen 7.5-325 hen 7.5-325 00:00:                     eded}      ophen        

   



     MG   MG   00                                 7.5-325 MG           

 

     Xarelto 10 Xarelto 10 -0      No             1{table QD   Xarelto 10   

        



     MG   MG   3-07                     t}        MG             



               00:00:                                              



               00                                                

 

     HYDROcodone HYDROcodone -0      No             1{table      HYDROcodon 

          



     -Acetaminop -Acetaminop 3-07                     t_as_ne      e-Acetamin   

        



     hen 7.5-325 hen 7.5-325 00:00:                     eded}      ophen        

   



     MG   MG   00                                 7.5-325 MG           

 

     Xarelto 10 Xarelto 10 -0      No             1{table QD   Xarelto 10   

        



     MG   MG   3-07                     t}        MG             



               00:00:                                              



               00                                                

 

     HYDROcodone HYDROcodone -0      No             1{table      HYDROcodon 

          



     -Acetaminop -Acetaminop 3-07                     t_as_ne      e-Acetamin   

        



     hen 7.5-325 hen 7.5-325 00:00:                     eded}      ophen        

   



     MG   MG   00                                 7.5-325 MG           

 

     Xarelto 10 Xarelto 10 -0      No             1{table QD   Xarelto 10   

        



     MG   MG   3-07                     t}        MG             



               00:00:                                              



               00                                                

 

     HYDROcodone HYDROcodone -0      No             1{table      HYDROcodon 

          



     -Acetaminop -Acetaminop 3-07                     t_as_ne      e-Acetamin   

        



     hen 7.5-325 hen 7.5-325 00:00:                     eded}      ophen        

   



     MG   MG   00                                 7.5-325 MG           

 

     Xarelto 10 Xarelto 10 -0      No             1{table QD   Xarelto 10   

        



     MG   MG   3-07                     t}        MG             



               00:00:                                              



               00                                                

 

     HYDROcodone HYDROcodone -0      No             1{table      HYDROcodon 

          



     -Acetaminop -Acetaminop 3-07                     t_as_ne      e-Acetamin   

        



     hen 7.5-325 hen 7.5-325 00:00:                     eded}      ophen        

   



     MG   MG   00                                 7.5-325 MG           

 

     Xarelto 10 Xarelto 10 -0      No             1{table QD   Xarelto 10   

        



     MG   MG   3-07                     t}        MG             



               00:00:                                              



               00                                                

 

     HYDROcodone HYDROcodone -0      No             1{table      HYDROcodon 

          



     -Acetaminop -Acetaminop 3-07                     t_as_ne      e-Acetamin   

        



     hen 7.5-325 hen 7.5-325 00:00:                     eded}      ophen        

   



     MG   MG   00                                 7.5-325 MG           

 

     Xarelto 10 Xarelto 10 -0      No             1{table QD   Xarelto 10   

        



     MG   MG   3-07                     t}        MG             



               00:00:                                              



               00                                                

 

     HYDROcodone HYDROcodone -0      No             1{table      HYDROcodon 

          



     -Acetaminop -Acetaminop 3-07                     t_as_ne      e-Acetamin   

        



     hen 7.5-325 hen 7.5-325 00:00:                     eded}      ophen        

   



     MG   MG   00                                 7.5-325 MG           

 

     Xarelto 10 Xarelto 10 -0      No             1{table QD   Xarelto 10   

        



     MG   MG   3-07                     t}        MG             



               00:00:                                              



               00                                                

 

     HYDROcodone HYDROcodone -0      No             1{table      HYDROcodon 

          



     -Acetaminop -Acetaminop 3-07                     t_as_ne      e-Acetamin   

        



     hen 7.5-325 hen 7.5-325 00:00:                     eded}      ophen        

   



     MG   MG   00                                 7.5-325 MG           

 

     Xarelto 10 Xarelto 10 -0      No             1{table QD   Xarelto 10   

        



     MG   MG   3-07                     t}        MG             



               00:00:                                              



               00                                                

 

     No known      2021      No                                      [facility]



     [facility]      2-29                                              [facility]



               15:29:                                              [address]

 

     No known      2021      No                                      [facility]



     medications      2-29                                              [facility]



               15:29:                                              [address]

 

     No known      2021      No                                      [facility]



     medications      2-29                                              [facility] of



               15:29:                                              [address]

 

     No known      2021      No                                      [facility]



     medications      2-29                                              [facility] of



               15:29:                                              [address]

 

     No known      2021      No                                      [facility]



     medications      2-29                                              [facility]



               15:29:                                              [address]

 

     No known      2021      No                                      [facility]



     medications      2-                                              [facility]



               15:29:                                              [address]

 

     No known      2021      No                                      [facility]



     medications      2-                                              [facility]



               15:29:                                              [address]

 

     Hyalgan 20 Hyalgan 20 2020      No             20mg                     C

[facility]



     mg   mg   2                                              [facility]



               00:00:                                              - [facility]



                                                               [facility]

 

     Hyalgan 20 Hyalgan 20 2020      No             20mg                     C

[facility]



     mg   mg   2-                                              [facility]



               00:00:                                              - [facility]



                                                               [facility]

 

     Hyalgan 20 Hyalgan 20 2020      No             20mg                     C

[facility]



     mg   mg   2-                                              [facility]



               00:00:                                              - [facility]



                                                               [facility]

 

     Hyalgan 20 Hyalgan 20 2020      No             20mg                     C

[facility]



     mg   mg   2-                                              [facility]



               00:00:                                              - [facility]



                                                               [facility]

 

     Hyalgan 20 Hyalgan 20 2020      No             20mg                     C

[facility]



     mg   mg   2-17                                              [facility]



               00:00:                                              - [facility]



                                                               [facility]

 

     Hyalgan 20 Hyalgan 20 2020      No             20mg                     C

[facility]



     mg   mg   2-17                                              [facility]



               00:00:                                              - [facility]



                                                               [facility]

 

     Hyalgan 20 Hyalgan 20 2020      No             20mg                     C

[facility]



     mg   mg   2-17                                              [facility]



               00:00:                                              - [facility]



                                                               [facility]

 

     Hyalgan 20 Hyalgan 20 2020      No             20mg                     C

[facility]



     mg   mg   2-17                                              [facility]



               00:00:                                              - [facility]



                                                               [facility]

 

     Hyalgan 20 Hyalgan 20 2020      No             20mg                     C

[facility]



     mg   mg   2-17                                              [facility]



               00:00:                                              - [facility]



                                                               [facility]

 

     Hyalgan 20 Hyalgan 20 -1      No             20mg                     C

[facility]



     mg   mg   2-17                                              [facility]



               00:00:                                              - [facility]



                                                               [facility]

 

     Hyalgan 20 Hyalgan 20 -      No             20mg                     C

[facility]



     mg   mg   2-17                                              [facility]



               00:00:                                              - [facility]



                                                               [facility]

 

     Hyalgan 20 Hyalgan 20 -1      No             20mg                     C

[facility]



     mg   mg   2-17                                              [facility]



               00:00:                                              - [facility]



                                                               [facility]

 

     Hyalgan 20 Hyalgan 20 -      No             20mg                     C

[facility]



     mg   mg   2-17                                              [facility]



               00:00:                                              - [facility]



                                                               [facility]

 

     Hyalgan 20 Hyalgan 20 -      No             20mg                     C

[facility]



     mg   mg   2-17                                              [facility]



               00:00:                                              - [facility]



                                                               [facility]

 

     Hyalgan 20 Hyalgan 20 -      No             20mg                     C

[facility]



     mg   mg   2-17                                              [facility]



               00:00:                                              - [facility]



                                                               [facility]

 

     Hyalgan 20 Hyalgan 20 -      No             20mg                     C

[facility]



     mg   mg   2-17                                              [facility]



               00:00:                                              - [facility]



                                                               [facility]

 

     Hyalgan 20 Hyalgan 20 -      No             20mg                     C

[facility]



     mg   mg   2-17                                              [facility]



               00:00:                                              - [facility]



                                                               [facility]

 

     Hyalgan 20 Hyalgan 20 -      No             20mg                     C

[facility]



     mg   mg   2-17                                              [facility]



               00:00:                                              - [facility]



                                                               [facility]

 

     Hyalgan 20 Hyalgan 20 -      No             20mg                     C

[facility]



     mg   mg   2-17                                              [facility]



               00:00:                                              - [facility]



                                                               [facility]

 

     Hyalgan 20 Hyalgan 20 -1      No             20mg                     C

[facility]



     mg   mg   2-17                                              [facility]



               00:00:                                              - [facility]



                                                               [facility]

 

     Hyalgan 20 Hyalgan 20 -      No             20mg                     C

[facility]



     mg   mg   2-17                                              [facility]



               00:00:                                              - [facility]



                                                               [facility]

 

     Hyalgan 20 Hyalgan 20 -1      No             20mg                     C

[facility]



     mg   mg   2-17                                              [facility]



               00:00:                                              - [facility]



                                                               [facility]

 

     Hyalgan 20 Hyalgan 20 -1      No             20mg                     C

[facility]



     mg   mg   2-17                                              [facility]



               00:00:                                              - [facility]



                                                               [facility]

 

     Hyalgan 20 Hyalgan 20 -      No             20mg                     C

[facility]



     mg   mg   2-17                                              [facility]



               00:00:                                              - [facility]



               00                                                [facility]

 

     Hyalgan 20 Hyalgan 20 -      No             20mg                     C

[facility]



     mg   mg   2-17                                              [facility]



               00:00:                                              - [facility]



                                                               [facility]

 

     Hyalgan 20 Hyalgan 20 -      No             20mg                     C

[facility]



     mg   mg   2-17                                              [facility]



               00:00:                                              - [facility]



                                                               [facility]

 

     Hyalgan 20 Hyalgan 20 -      No             20mg                     C

[facility]



     mg   mg   2-17                                              [facility]



               00:00:                                              - [facility]



                                                               [facility]

 

     Hyalgan 20 Hyalgan 20 -      No             20mg                     C

[facility]



     mg   mg   2-17                                              [facility]



               00:00:                                              - [facility]



                                                               [facility]

 

     Hyalgan 20 Hyalgan 20 -      No             20mg                     C

[facility]



     mg   mg   2-17                                              [facility]



               00:00:                                              - [facility]



                                                               [facility]

 

     Hyalgan 20 Hyalgan 20 -      No             20mg                     C

[facility]



     mg   mg   2-17                                              [facility]



               00:00:                                              - [facility]



                                                               [facility]

 

     Hyalgan 20 Hyalgan 20 -      No             20mg                     C

ommon



     mg   mg   2-17                                              [facility]



               00:00:                                              - [facility]



                                                               [facility]

 

     Hyalgan 20 Hyalgan 20 -      No             20mg                     C

[facility]



     mg   mg   2-17                                              [facility]



               00:00:                                              - [facility]



                                                               [facility]

 

     Hyalgan 20 Hyalgan 20 -      No             20mg                     C

[facility]



     mg   mg   2-17                                              [facility]



               00:00:                                              - [facility]



                                                               [facility]

 

     Hyalgan 20 Hyalgan 20 -      No             20mg                     C

[facility]



     mg   mg   2-17                                              [facility]



               00:00:                                              - [facility]



                                                               [facility]

 

     Hyalgan 20 Hyalgan 20 -      No             20mg                     C

om[facility]



     mg   mg   2-17                                              [facility]



               00:00:                                              - [facility]



                                                               [facility]

 

     Hyalgan 20 Hyalgan 20 -      No             20mg                     C

[facility]



     mg   mg   2-17                                              [facility]



               00:00:                                              - [facility]



                                                               [facility]

 

     Hyalgan 20 Hyalgan 20 -      No             20mg                     C

[facility]



     mg   mg   2-17                                              [facility]



               00:00:                                              - [facility]



                                                               [facility]

 

     Hyalgan 20 Hyalgan 20 -1      No             20mg                     C

[facility]



     mg   mg   2-17                                              [facility]



               00:00:                                              - [facility]



                                                               [facility]

 

     Hyalgan 20 Hyalgan 20 -      No             20mg                     C

[facility]



     mg   mg   2-17                                              [facility]



               00:00:                                              - [facility]



                                                               [facility]

 

     Hyalgan 20 Hyalgan 20 2020      No             20mg                     C

[facility]



     mg   mg   2-17                                              [facility]



               00:00:                                              - [facility]



                                                               [facility]

 

     Hyalgan 20 Hyalgan 20 -      No             20mg                     C

[facility]



     mg   mg   2-17                                              [facility]



               00:00:                                              - [facility]



                                                               [facility]

 

     Hyalgan 20 Hyalgan 20 -      No             20mg                     C

ommon



     mg   mg   2-17                                              [facility]



               00:00:                                              - [facility]



                                                               [facility]

 

     Hyalgan 20 Hyalgan 20 -      No             20mg                     C

[facility]



     mg   mg   2-17                                              [facility]



               00:00:                                              - [facility]



                                                               [facility]

 

     Hyalgan 20 Hyalgan 20 2020      No             20mg                     C

[facility]



     mg   mg   2-                                              [facility]



               00:00:                                              - [facility]



                                                               [facility]

 

     Hyalgan 20 Hyalgan 20 2020      No             20mg                     C

[facility]



     mg   mg   2                                              [facility]



               00:00:                                              - [facility]



                                                               [facility]

 

     Hyalgan 20 Hyalgan 20 2020      No             20mg                     C

[facility]



     mg   mg   2-                                              [facility]



               00:00:                                              - [facility]



                                                               [facility]

 

     Hyalgan 20 Hyalgan 20 2020      No             20mg                     C

[facility]



     mg   mg   2-                                              [facility]



               00:00:                                              - [facility]



                                                               [facility]

 

     Hyalgan 20 Hyalgan 20 2020      No             20mg                     C

[facility]



     mg   mg   2                                              [facility]



               00:00:                                              - [facility]



                                                               [facility]

 

     Hyalgan 20 Hyalgan 20 2020      No             20mg                     C

[facility]



     mg   mg   2-                                              [facility]



               00:00:                                              - [facility]



                                                               [facility]

 

     Hyalgan 20 Hyalgan 20 -      No             20mg                     C

[facility]



     mg   mg   2-17                                              [facility]



               00:00:                                              - [facility]



                                                               [facility]

 

     Hyalgan 20 Hyalgan 20 -      No             20mg                     C

[facility]



     mg   mg   2-17                                              [facility]



               00:00:                                              - [facility]



                                                               [facility]

 

     Hyalgan 20 Hyalgan 20 -      No             20mg                     C

[facility]



     mg   mg   2-17                                              [facility]



               00:00:                                              - [facility]



                                                               [facility]

 

     Hyalgan 20 Hyalgan 20 -      No             20mg                     C

[facility]



     mg   mg   2-17                                              [facility]



               00:00:                                              - [facility]



                                                               [facility]

 

     Hyalgan 20 Hyalgan 20 -      No             20mg                     C

[facility]



     mg   mg   2-17                                              [facility]



               00:00:                                              - [facility]



                                                               [facility]

 

     Hyalgan 20 Hyalgan 20 -      No             20mg                     C

[facility]



     mg   mg   2-17                                              [facility]



               00:00:                                              - [facility]



                                                               [facility]

 

     Hyalgan 20 Hyalgan 20 -      No             20mg                     C

[facility]



     mg   mg   2-17                                              [facility]



               00:00:                                              - [facility]



                                                               [facility]

 

     Hyalgan 20 Hyalgan 20 -      No             20mg                     C

ommon



     mg   mg   2-17                                              [facility]



               00:00:                                              - [facility]



                                                               [facility]

 

     Hyalgan 20 Hyalgan 20 -      No             20mg                     C

ommon



     mg   mg   2-17                                              [facility]



               00:00:                                              - [facility]



                                                               [facility]

 

     Hyalgan 20 Hyalgan 20 -      No             20mg                     C

ommon



     mg   mg   2-17                                              [facility]



               00:00:                                              - [facility]



                                                               [facility]

 

     Hyalgan 20 Hyalgan 20 -      No             20mg                     C

ommon



     mg   mg   2-17                                              [facility]



               00:00:                                              - [facility]



                                                               [facility]

 

     Hyalgan 20 Hyalgan 20 -      No             20mg                     C

ommon



     mg   mg   2-17                                              [facility]



               00:00:                                              - [facility]



                                                               [facility]

 

     Hyalgan 20 Hyalgan 20 -      No             20mg                     C

ommon



     mg   mg   2-17                                              [facility]



               00:00:                                              - [facility]



                                                               [facility]

 

     Hyalgan 20 Hyalgan 20 -      No             20mg                     C

ommon



     mg   mg   2-10                                              [facility]



               00:00:                                              - [facility]



                                                               [facility]

 

     Hyalgan 20 Hyalgan 20 -      No             20mg                     C

ommon



     mg   mg   2-10                                              [facility]



               00:00:                                              - [facility]



                                                               [facility]

 

     Hyalgan 20 Hyalgan 20 -      No             20mg                     C

ommon



     mg   mg   2-10                                              [facility]



               00:00:                                              - [facility]



                                                               [facility]

 

     Hyalgan 20 Hyalgan 20 -      No             20mg                     C

ommon



     mg   mg   2-10                                              [facility]



               00:00:                                              - [facility]



               00                                                [facility]

 

     Hyalgan 20 Hyalgan 20 -      No             20mg                     C

ommon



     mg   mg   2-10                                              [facility]



               00:00:                                              - [facility]



               00                                                [facility]

 

     Hyalgan 20 Hyalgan 20 -      No             20mg                     C

ommon



     mg   mg   2-10                                              [facility]



               00:00:                                              - [facility]



               00                                                [facility]

 

     Hyalgan 20 Hyalgan 20 -      No             20mg                     C

ommon



     mg   mg   2-10                                              [facility]



               00:00:                                              - [facility]



                                                               [facility]

 

     Hyalgan 20 Hyalgan 20 -1      No             20mg                     C

ommon



     mg   mg   2-10                                              [facility]



               00:00:                                              - [facility]



               00                                                [facility]

 

     Hyalgan 20 Hyalgan 20 -1      No             20mg                     C

ommon



     mg   mg   2-10                                              [facility]



               00:00:                                              - [facility]



                                                               [facility]

 

     Hyalgan 20 Hyalgan 20 -1      No             20mg                     C

ommon



     mg   mg   2-10                                              [facility]



               00:00:                                              - [facility]



                                                               [facility]

 

     Hyalgan 20 Hyalgan 20 -      No             20mg                     C

ommon



     mg   mg   2-10                                              [facility]



               00:00:                                              - [facility]



                                                               [facility]

 

     Hyalgan 20 Hyalgan 20 -      No             20mg                     C

ommon



     mg   mg   2-10                                              [facility]



               00:00:                                              - [facility]



                                                               [facility]

 

     Hyalgan 20 Hyalgan 20 -      No             20mg                     C

ommon



     mg   mg   2-10                                              [facility]



               00:00:                                              - [facility]



               00                                                [facility]

 

     Hyalgan 20 Hyalgan 20 -      No             20mg                     C

ommon



     mg   mg   2-10                                              [facility]



               00:00:                                              - [facility]



                                                               [facility]

 

     Hyalgan 20 Hyalgan 20 -      No             20mg                     C

ommon



     mg   mg   2-10                                              [facility]



               00:00:                                              - [facility]



                                                               [facility]

 

     Hyalgan 20 Hyalgan 20 -      No             20mg                     C

ommon



     mg   mg   2-10                                              [facility]



               00:00:                                              - [facility]



                                                               [facility]

 

     Hyalgan 20 Hyalgan 20 -      No             20mg                     C

ommon



     mg   mg   2-10                                              [facility]



               00:00:                                              - [facility]



                                                               [facility]

 

     Hyalgan 20 Hyalgan 20 -1      No             20mg                     C

ommon



     mg   mg   2-10                                              [facility]



               00:00:                                              - [facility]



                                                               [facility]

 

     Hyalgan 20 Hyalgan 20 -1      No             20mg                     C

ommon



     mg   mg   2-10                                              [facility]



               00:00:                                              - [facility]



               00                                                [facility]

 

     Hyalgan 20 Hyalgan 20 -1      No             20mg                     C

ommon



     mg   mg   2-10                                              [facility]



               00:00:                                              - [facility]



               00                                                [facility]

 

     Hyalgan 20 Hyalgan 20 2020-1      No             20mg                     C

ommon



     mg   mg   2-10                                              [facility]



               00:00:                                              - [facility]



               00                                                [facility]

 

     Hyalgan 20 Hyalgan 20 -1      No             20mg                     C

ommon



     mg   mg   2-10                                              [facility]



               00:00:                                              - [facility]



               00                                                [facility]

 

     Hyalgan 20 Hyalgan 20 -1      No             20mg                     C

ommon



     mg   mg   2-10                                              [facility]



               00:00:                                              - [facility]



               00                                                [facility]

 

     Hyalgan 20 Hyalgan 20 2020-1      No             20mg                     C

ommon



     mg   mg   2-10                                              [facility]



               00:00:                                              - [facility]



               00                                                [facility]

 

     Hyalgan 20 Hyalgan 20 2020-1      No             20mg                     C

ommon



     mg   mg   2-10                                              [facility]



               00:00:                                              - [facility]



               00                                                [facility]

 

     Hyalgan 20 Hyalgan 20 -1      No             20mg                     C

ommon



     mg   mg   2-10                                              [facility]



               00:00:                                              - [facility]



               00                                                [facility]

 

     Hyalgan 20 Hyalgan 20 -1      No             20mg                     C

ommon



     mg   mg   2-10                                              [facility]



               00:00:                                              - [facility]



               00                                                [facility]

 

     Hyalgan 20 Hyalgan 20 -1      No             20mg                     C

ommon



     mg   mg   2-10                                              [facility]



               00:00:                                              - [facility]



               00                                                [facility]

 

     Hyalgan 20 Hyalgan 20 -1      No             20mg                     C

ommon



     mg   mg   2-10                                              [facility]



               00:00:                                              - [facility]



                                                               [facility]

 

     Hyalgan 20 Hyalgan 20 -1      No             20mg                     C

ommon



     mg   mg   2-10                                              [facility]



               00:00:                                              - [facility]



               00                                                [facility]

 

     Hyalgan 20 Hyalgan 20 -1      No             20mg                     C

ommon



     mg   mg   2-10                                              [facility]



               00:00:                                              - [facility]



               00                                                [facility]

 

     Hyalgan 20 Hyalgan 20 -1      No             20mg                     C

ommon



     mg   mg   2-10                                              [facility]



               00:00:                                              - [facility]



               00                                                [facility]

 

     Hyalgan 20 Hyalgan 20 -1      No             20mg                     C

ommon



     mg   mg   2-10                                              [facility]



               00:00:                                              - [facility]



               00                                                [facility]

 

     Hyalgan 20 Hyalgan 20 2020-1      No             20mg                     C

ommon



     mg   mg   2-10                                              [facility]



               00:00:                                              - [facility]



               00                                                [facility]

 

     Hyalgan 20 Hyalgan 20 -1      No             20mg                     C

ommon



     mg   mg   2-10                                              [facility]



               00:00:                                              - [facility]



               00                                                [facility]

 

     Hyalgan 20 Hyalgan 20 2020-1      No             20mg                     C

ommon



     mg   mg   2-10                                              [facility]



               00:00:                                              - [facility]



               00                                                [facility]

 

     Hyalgan 20 Hyalgan 20 2020-1      No             20mg                     C

ommon



     mg   mg   2-10                                              [facility]



               00:00:                                              - [facility]



               00                                                [facility]

 

     Hyalgan 20 Hyalgan 20 2020-1      No             20mg                     C

ommon



     mg   mg   2-10                                              [facility]



               00:00:                                              - [facility]



               00                                                [facility]

 

     Hyalgan 20 Hyalgan 20 2020-1      No             20mg                     C

ommon



     mg   mg   2-10                                              [facility]



               00:00:                                              - [facility]



               00                                                [facility]

 

     Hyalgan 20 Hyalgan 20 2020-1      No             20mg                     C

ommon



     mg   mg   2-10                                              [facility]



               00:00:                                              - [facility]



               00                                                [facility]

 

     Hyalgan 20 Hyalgan 20 2020-1      No             20mg                     C

ommon



     mg   mg   2-10                                              [facility]



               00:00:                                              - [facility]



               00                                                [facility]

 

     Hyalgan 20 Hyalgan 20 -1      No             20mg                     C

ommon



     mg   mg   2-10                                              [facility]



               00:00:                                              - [facility]



               00                                                [facility]

 

     Hyalgan 20 Hyalgan 20 -1      No             20mg                     C

ommon



     mg   mg   2-10                                              [facility]



               00:00:                                              - [facility]



                                                               [facility]

 

     Hyalgan 20 Hyalgan 20 -1      No             20mg                     C

ommon



     mg   mg   2-10                                              [facility]



               00:00:                                              - [facility]



                                                               [facility]

 

     Hyalgan 20 Hyalgan 20 -1      No             20mg                     C

ommon



     mg   mg   2-10                                              [facility]



               00:00:                                              - [facility]



                                                               [facility]

 

     Hyalgan 20 Hyalgan 20 -1      No             20mg                     C

ommon



     mg   mg   2-10                                              [facility]



               00:00:                                              - [facility]



               00                                                [facility]

 

     Hyalgan 20 Hyalgan 20 -1      No             20mg                     C

ommon



     mg   mg   2-10                                              [facility]



               00:00:                                              - [facility]



               00                                                [facility]

 

     Hyalgan 20 Hyalgan 20 -1      No             20mg                     C

ommon



     mg   mg   2-10                                              [facility]



               00:00:                                              - [facility]



               00                                                [facility]

 

     Hyalgan 20 Hyalgan 20 2020-1      No             20mg                     C

ommon



     mg   mg   2-10                                              [facility]



               00:00:                                              - [facility]



               00                                                [facility]

 

     Hyalgan 20 Hyalgan 20 2020-1      No             20mg                     C

ommon



     mg   mg   2-10                                              [facility]



               00:00:                                              - [facility]



               00                                                [facility]

 

     Hyalgan 20 Hyalgan 20 2020-1      No             20mg                     C

ommon



     mg   mg   2-10                                              [facility]



               00:00:                                              - [facility]



               00                                                [facility]

 

     Hyalgan 20 Hyalgan 20 2020-1      No             20mg                     C

ommon



     mg   mg   2-10                                              [facility]



               00:00:                                              - [facility]



               00                                                [facility]

 

     Hyalgan 20 Hyalgan 20 2020-1      No             20mg                     C

ommon



     mg   mg   2-10                                              [facility]



               00:00:                                              - [facility]



               00                                                [facility]

 

     Hyalgan 20 Hyalgan 20 2020-1      No             20mg                     C

ommon



     mg   mg   2-10                                              [facility]



               00:00:                                              - [facility]



                                                               [facility]

 

     Hyalgan 20 Hyalgan 20 -1      No             20mg                     C

ommon



     mg   mg   2-10                                              [facility]



               00:00:                                              - [facility]



               00                                                [facility]

 

     Hyalgan 20 Hyalgan 20 -1      No             20mg                     C

ommon



     mg   mg   2-10                                              [facility]



               00:00:                                              - [facility]



               00                                                [facility]

 

     Hyalgan 20 Hyalgan 20 -      No             20mg                     C

ommon



     mg   mg   2-10                                              [facility]



               00:00:                                              - [facility]



               00                                                [facility]

 

     Hyalgan 20 Hyalgan 20 -      No             20mg                     C

ommon



     mg   mg   2-10                                              [facility]



               00:00:                                              - [facility]



               00                                                [facility]

 

     Hyalgan 20 Hyalgan 20 -      No             20mg                     C

ommon



     mg   mg   2-10                                              [facility]



               00:00:                                              - [facility]



                                                               [facility]

 

     Hyalgan 20 Hyalgan 20 -      No             20mg                     C

ommon



     mg   mg   2-10                                              [facility]



               00:00:                                              - [facility]



                                                               [facility]

 

     Hyalgan 20 Hyalgan 20 -      No             20mg                     C

ommon



     mg   mg   2-10                                              [facility]



               00:00:                                              - [facility]



               00                                                [facility]

 

     Hyalgan 20 Hyalgan 20 -      No             20mg                     C

ommon



     mg   mg   2-10                                              [facility]



               00:00:                                              - [facility]



                                                               [facility]

 

     Hyalgan 20 Hyalgan 20 -      No             20mg                     C

ommon



     mg   mg   2-04                                              [facility]



               00:00:                                              - [facility]



                                                               [facility]

 

     Kenalog Kenalog 2020      No             40mg                     [facility]



     (Triamcinol (Triamcinol 2-04                                              S

[facility]irit



     one) one) 00:00:                                              - [facility]



                                                               [facility]

 

     Hyalgan 20 Hyalgan 20 -      No             20mg                     C

ommon



     mg   mg   2-04                                              [facility]



               00:00:                                              - [facility]



               00                                                [facility]

 

     Bupivicaine Bupivicaine -      No             2.5mg                   

  [facility]



     Hydro Hydro 2-04                                              [facility]



               00:00:                                              - [facility]



               00                                                [facility]

 

     Bupivicaine Bupivicaine -      No             2.5mg                   

  [facility]



     Hydro Hydro 2-04                                              [facility]



               00:00:                                              - [facility]



                                                               [facility]

 

     Kenalog Kenalog 2020      No             40mg                     Common



     (Triamcinol (Triamcinol 2-04                                              S

pirit



     one) one) 00:00:                                              - [facility]



               00                                                [facility]

 

     Hyalgan 20 Hyalgan 20 -1      No             20mg                     C

ommon



     mg   mg   2-04                                              Spirit



               00:00:                                              - [facility]



               00                                                [facility]

 

     Kenalog Kenalog -      No             40mg                     Common



     (Triamcinol (Triamcinol 2-04                                              S

pirit



     one) one) 00:00:                                              - [facility]



               00                                                [facility]

 

     Hyalgan 20 Hyalgan 20 -      No             20mg                     C

ommon



     mg   mg   2-04                                              Spirit



               00:00:                                              - [facility]



               00                                                [facility]

 

     Bupivicaine Bupivicaine -      No             2.5mg                   

  Common



     Hydro Hydro 2-04                                              Spirit



               00:00:                                              - [facility]



               00                                                [facility]

 

     Bupivicaine Bupivicaine -      No             2.5mg                   

  Common



     Hydro Hydro 2-04                                              Spirit



               00:00:                                              - [facility]



               00                                                [facility]

 

     Kenalog Kenalog -      No             40mg                     Common



     (Triamcinol (Triamcinol 2-04                                              S

pirit



     one) one) 00:00:                                              - [facility]



               00                                                [facility]

 

     Hyalgan 20 Hyalgan 20 -      No             20mg                     C

ommon



     mg   mg   2-04                                              Spirit



               00:00:                                              - [facility]



               00                                                [facility]

 

     Kenalog Kenalog -      No             40mg                     Common



     (Triamcinol (Triamcinol 2-04                                              S

pirit



     one) one) 00:00:                                              - [facility]



               00                                                [facility]

 

     Hyalgan 20 Hyalgan 20 -1      No             20mg                     C

ommon



     mg   mg   2-04                                              Spirit



               00:00:                                              - [facility]



               00                                                [facility]

 

     Bupivicaine Bupivicaine -      No             2.5mg                   

  Common



     Hydro Hydro 2-04                                              Spirit



               00:00:                                              - [facility]



               00                                                [facility]

 

     Bupivicaine Bupivicaine -1      No             2.5mg                   

  Common



     Hydro Hydro 2-04                                              Spirit



               00:00:                                              - [facility]



               00                                                [facility]

 

     Kenalog Kenalog -      No             40mg                     Common



     (Triamcinol (Triamcinol 2-04                                              S

pirit



     one) one) 00:00:                                              - [facility]



               00                                                [facility]

 

     Hyalgan 20 Hyalgan 20 -1      No             20mg                     C

ommon



     mg   mg   2-04                                              Spirit



               00:00:                                              - [facility]



               00                                                [facility]

 

     Kenalog Kenalog 2020      No             40mg                     Common



     (Triamcinol (Triamcinol 2-04                                              S

pirit



     one) one) 00:00:                                              - [facility]



               00                                                [facility]

 

     Hyalgan 20 Hyalgan 20 -      No             20mg                     C

ommon



     mg   mg   2-04                                              Spirit



               00:00:                                              - [facility]



               00                                                [facility]

 

     Bupivicaine Bupivicaine -      No             2.5mg                   

  Common



     Hydro Hydro 2-04                                              Spirit



               00:00:                                              - [facility]



               00                                                [facility]

 

     Bupivicaine Bupivicaine -      No             2.5mg                   

  Common



     Hydro Hydro 2-04                                              Spirit



               00:00:                                              - [facility]



               00                                                [facility]

 

     Kenalog Kenalog 2020      No             40mg                     Common



     (Triamcinol (Triamcinol 2-04                                              S

pirit



     one) one) 00:00:                                              - [facility]



               00                                                [facility]

 

     Hyalgan 20 Hyalgan 20 2020      No             20mg                     C

ommon



     mg   mg   2-04                                              Spirit



               00:00:                                              - [facility]



               00                                                [facility]

 

     Kenalog Kenalog 2020      No             40mg                     Common



     (Triamcinol (Triamcinol 2-04                                              S

pirit



     one) one) 00:00:                                              - [facility]



               00                                                [facility]

 

     Hyalgan 20 Hyalgan 20 2020      No             20mg                     C

ommon



     mg   mg   2-04                                              Spirit



               00:00:                                              - [facility]



               00                                                [facility]

 

     Bupivicaine Bupivicaine -      No             2.5mg                   

  Common



     Hydro Hydro 2-04                                              Spirit



               00:00:                                              - [facility]



               00                                                [facility]

 

     Bupivicaine Bupivicaine -      No             2.5mg                   

  Common



     Hydro Hydro 2-04                                              Spirit



               00:00:                                              - [facility]



               00                                                [facility]

 

     Kenalog Kenalog 2020      No             40mg                     Common



     (Triamcinol (Triamcinol 2-04                                              S

pirit



     one) one) 00:00:                                              - [facility]



               00                                                [facility]

 

     Hyalgan 20 Hyalgan 20 -      No             20mg                     C

ommon



     mg   mg   2-04                                              Spirit



               00:00:                                              - [facility]



               00                                                [facility]

 

     Kenalog Kenalog 2020      No             40mg                     Common



     (Triamcinol (Triamcinol 2-04                                              S

pirit



     one) one) 00:00:                                              - [facility]



               00                                                [facility]

 

     Hyalgan 20 Hyalgan 20 -1      No             20mg                     C

ommon



     mg   mg   2-04                                              Spirit



               00:00:                                              - [facility]



               00                                                [facility]

 

     Bupivicaine Bupivicaine -      No             2.5mg                   

  Common



     Hydro Hydro 2-04                                              Spirit



               00:00:                                              - [facility]



               00                                                [facility]

 

     Bupivicaine Bupivicaine -      No             2.5mg                   

  Common



     Hydro Hydro 2-04                                              Spirit



               00:00:                                              - [facility]



               00                                                [facility]

 

     Kenalog Kenalog -      No             40mg                     Common



     (Triamcinol (Triamcinol 2-04                                              S

pirit



     one) one) 00:00:                                              - [facility]



               00                                                [facility]

 

     Hyalgan 20 Hyalgan 20 -      No             20mg                     C

ommon



     mg   mg   2-04                                              Spirit



               00:00:                                              - [facility]



               00                                                [facility]

 

     Kenalog Kenalog -      No             40mg                     Common



     (Triamcinol (Triamcinol 2-04                                              S

pirit



     one) one) 00:00:                                              - [facility]



               00                                                [facility]

 

     Hyalgan 20 Hyalgan 20 -      No             20mg                     C

ommon



     mg   mg   2-04                                              Spirit



               00:00:                                              - [facility]



               00                                                [facility]

 

     Bupivicaine Bupivicaine -      No             2.5mg                   

  Common



     Hydro Hydro 2-04                                              Spirit



               00:00:                                              - [facility]



               00                                                [facility]

 

     Bupivicaine Bupivicaine -      No             2.5mg                   

  Common



     Hydro Hydro 2-04                                              Spirit



               00:00:                                              - [facility]



               00                                                [facility]

 

     Kenalog Kenalog -      No             40mg                     Common



     (Triamcinol (Triamcinol 2-04                                              S

pirit



     one) one) 00:00:                                              - [facility]



               00                                                [facility]

 

     Hyalgan 20 Hyalgan 20 -      No             20mg                     C

ommon



     mg   mg   2-04                                              Spirit



               00:00:                                              - [facility]



               00                                                [facility]

 

     Kenalog Kenalog -      No             40mg                     Common



     (Triamcinol (Triamcinol 2-04                                              S

pirit



     one) one) 00:00:                                              - [facility]



               00                                                [facility]

 

     Hyalgan 20 Hyalgan 20 -1      No             20mg                     C

ommon



     mg   mg   2-04                                              Spirit



               00:00:                                              - [facility]



               00                                                [facility]

 

     Bupivicaine Bupivicaine 2020-      No             2.5mg                   

  Common



     Hydro Hydro 2-04                                              Spirit



               00:00:                                              - [facility]



               00                                                [facility]

 

     Bupivicaine Bupivicaine 2020-      No             2.5mg                   

  Common



     Hydro Hydro 2-04                                              Spirit



               00:00:                                              - [facility]



               00                                                [facility]

 

     Kenalog Kenalog 2020-      No             40mg                     Common



     (Triamcinol (Triamcinol 2-04                                              S

pirit



     one) one) 00:00:                                              - [facility]



               00                                                [facility]

 

     Hyalgan 20 Hyalgan 20 -      No             20mg                     C

ommon



     mg   mg   2-04                                              Spirit



               00:00:                                              - [facility]



               00                                                [facility]

 

     Kenalog Kenalog -      No             40mg                     Common



     (Triamcinol (Triamcinol 2-04                                              S

pirit



     one) one) 00:00:                                              - [facility]



               00                                                [facility]

 

     Hyalgan 20 Hyalgan 20 -      No             20mg                     C

ommon



     mg   mg   2-04                                              Sp[facility]



               00:00:                                              - [facility]



               00                                                [facility]

 

     Bupivicaine Bupivicaine -      No             2.5mg                   

  Common



     Hydro Hydro 2-04                                              Spirit



               00:00:                                              - [facility]



               00                                                [facility]

 

     Bupivicaine Bupivicaine -      No             2.5mg                   

  Common



     Hydro Hydro 2-04                                              Spirit



               00:00:                                              - [facility]



               00                                                [facility]

 

     Kenalog Kenalog -      No             40mg                     Common



     (Triamcinol (Triamcinol 2-04                                              S

pirit



     one) one) 00:00:                                              - [facility]



               00                                                [facility]

 

     Hyalgan 20 Hyalgan 20 -      No             20mg                     C

ommon



     mg   mg   2-04                                              Spirit



               00:00:                                              - [facility]



               00                                                [facility]

 

     Kenalog Kenalog -      No             40mg                     Common



     (Triamcinol (Triamcinol 2-04                                              S

pirit



     one) one) 00:00:                                              - [facility]



               00                                                [facility]

 

     Hyalgan 20 Hyalgan 20 -      No             20mg                     C

ommon



     mg   mg   2-04                                              Spirit



               00:00:                                              - [facility]



               00                                                [facility]

 

     Bupivicaine Bupivicaine 2020-      No             2.5mg                   

  Common



     Hydro Hydro 2-04                                              Spirit



               00:00:                                              - [facility]



               00                                                [facility]

 

     Bupivicaine Bupivicaine 2020-      No             2.5mg                   

  Common



     Hydro Hydro 2-04                                              Spirit



               00:00:                                              - [facility]



               00                                                [facility]

 

     Kenalog Kenalog 2020-      No             40mg                     Common



     (Triamcinol (Triamcinol 2-04                                              S

pirit



     one) one) 00:00:                                              - [facility]



               00                                                [facility]

 

     Hyalgan 20 Hyalgan 20 -1      No             20mg                     C

ommon



     mg   mg   2-04                                              [facility]



               00:00:                                              - [facility]



               00                                                [facility]

 

     Kenalog Kenalog -      No             40mg                     Common



     (Triamcinol (Triamcinol 2-04                                              S

pirit



     one) one) 00:00:                                              - [facility]



               00                                                [facility]

 

     Hyalgan 20 Hyalgan 20 -      No             20mg                     C

ommon



     mg   mg   2-04                                              [facility]



               00:00:                                              - [facility]



               00                                                [facility]

 

     Bupivicaine Bupivicaine -      No             2.5mg                   

  Common



     Hydro Hydro 2-04                                              Sp[facility]



               00:00:                                              - [facility]



               00                                                [facility]

 

     Bupivicaine Bupivicaine -      No             2.5mg                   

  Common



     Hydro Hydro 2-04                                              Spirit



               00:00:                                              - [facility]



               00                                                [facility]

 

     Kenalog Kenalog -      No             40mg                     Common



     (Triamcinol (Triamcinol 2-04                                              S

pirit



     one) one) 00:00:                                              - [facility]



               00                                                [facility]

 

     Hyalgan 20 Hyalgan 20 -      No             20mg                     C

ommon



     mg   mg   2-04                                              Sp[facility]



               00:00:                                              - [facility]



               00                                                [facility]

 

     Kenalog Kenalog -      No             40mg                     Common



     (Triamcinol (Triamcinol 2-04                                              S

pirit



     one) one) 00:00:                                              - [facility]



               00                                                [facility]

 

     Hyalgan 20 Hyalgan 20 -1      No             20mg                     C

ommon



     mg   mg   2-04                                              Sp[facility]



               00:00:                                              - [facility]



               00                                                [facility]

 

     Bupivicaine Bupivicaine -1      No             2.5mg                   

  Common



     Hydro Hydro 2-04                                              Sp[facility]



               00:00:                                              - [facility]



               00                                                [facility]

 

     Bupivicaine Bupivicaine -1      No             2.5mg                   

  Common



     Hydro Hydro 2-04                                              Sp[facility]



               00:00:                                              - [facility]



               00                                                [facility]

 

     Kenalog Kenalog -      No             40mg                     Common



     (Triamcinol (Triamcinol 2-04                                              S

pirit



     one) one) 00:00:                                              - [facility]



               00                                                [facility]

 

     Hyalgan 20 Hyalgan 20 -1      No             20mg                     C

ommon



     mg   mg   2-04                                              [facility]



               00:00:                                              - [facility]



               00                                                [facility]

 

     Kenalog Kenalog 2020      No             40mg                     Common



     (Triamcinol (Triamcinol 2-04                                              S

pirit



     one) one) 00:00:                                              - [facility]



               00                                                [facility]

 

     Hyalgan 20 Hyalgan 20 -      No             20mg                     C

ommon



     mg   mg   2-04                                              [facility]



               00:00:                                              - [facility]



               00                                                [facility]

 

     Bupivicaine Bupivicaine -      No             2.5mg                   

  Common



     Hydro Hydro 2-04                                              Sp[facility]



               00:00:                                              - [facility]



               00                                                [facility]

 

     Bupivicaine Bupivicaine -      No             2.5mg                   

  Common



     Hydro Hydro 2-04                                              Sp[facility]



               00:00:                                              - [facility]



               00                                                [facility]

 

     Kenalog Kenalog 2020      No             40mg                     Common



     (Triamcinol (Triamcinol 2-04                                              S

pirit



     one) one) 00:00:                                              - [facility]



               00                                                [facility]

 

     Hyalgan 20 Hyalgan 20 2020      No             20mg                     C

ommon



     mg   mg   2-04                                              [facility]



               00:00:                                              - [facility]



               00                                                [facility]

 

     Kenalog Kenalog 2020      No             40mg                     Common



     (Triamcinol (Triamcinol 2-04                                              S

pirit



     one) one) 00:00:                                              - [facility]



               00                                                [facility]

 

     Hyalgan 20 Hyalgan 20 -      No             20mg                     C

ommon



     mg   mg   2-04                                              [facility]



               00:00:                                              - [facility]



               00                                                [facility]

 

     Bupivicaine Bupivicaine -      No             2.5mg                   

  Common



     Hydro Hydro 2-04                                              [facility]



               00:00:                                              - [facility]



               00                                                [facility]

 

     Bupivicaine Bupivicaine -      No             2.5mg                   

  Common



     Hydro Hydro 2-04                                              [facility]



               00:00:                                              - [facility]



               00                                                [facility]

 

     Kenalog Kenalog 2020      No             40mg                     Common



     (Triamcinol (Triamcinol 2-04                                              S

pirit



     one) one) 00:00:                                              - [facility]



               00                                                [facility]

 

     Hyalgan 20 Hyalgan 20 -      No             20mg                     C

ommon



     mg   mg   2-04                                              [facility]



               00:00:                                              - [facility]



               00                                                [facility]

 

     Kenalog Kenalog 2020      No             40mg                     Common



     (Triamcinol (Triamcinol 2-04                                              S

pirit



     one) one) 00:00:                                              - [facility]



               00                                                [facility]

 

     Hyalgan 20 Hyalgan 20 -      No             20mg                     C

ommon



     mg   mg   2-04                                              [facility]



               00:00:                                              - [facility]



               00                                                [facility]

 

     Bupivicaine Bupivicaine -      No             2.5mg                   

  Common



     Hydro Hydro 2-04                                              [facility]



               00:00:                                              - [facility]



               00                                                [facility]

 

     Bupivicaine Bupivicaine -      No             2.5mg                   

  Common



     Hydro Hydro 2-04                                              [facility]



               00:00:                                              - [facility]



               00                                                [facility]

 

     Kenalog Kenalog -      No             40mg                     Common



     (Triamcinol (Triamcinol 2-04                                              S

pirit



     one) one) 00:00:                                              - [facility]



               00                                                [facility]

 

     Hyalgan 20 Hyalgan 20 -      No             20mg                     C

ommon



     mg   mg   2-04                                              [facility]



               00:00:                                              - [facility]



               00                                                [facility]

 

     Kenalog Kenalog -      No             40mg                     Common



     (Triamcinol (Triamcinol 2-04                                              S

pirit



     one) one) 00:00:                                              - [facility]



               00                                                [facility]

 

     Hyalgan 20 Hyalgan 20 -      No             20mg                     C

ommon



     mg   mg   2-04                                              [facility]



               00:00:                                              - [facility]



               00                                                [facility]

 

     Bupivicaine Bupivicaine -      No             2.5mg                   

  Common



     Hydro Hydro 2-04                                              [facility]



               00:00:                                              - [facility]



               00                                                [facility]

 

     Bupivicaine Bupivicaine -      No             2.5mg                   

  Common



     Hydro Hydro 2-04                                              [facility]



               00:00:                                              - [facility]



               00                                                [facility]

 

     Kenalog Kenalog -      No             40mg                     Common



     (Triamcinol (Triamcinol 2-04                                              S

pirit



     one) one) 00:00:                                              - [facility]



               00                                                [facility]

 

     Hyalgan 20 Hyalgan 20 -      No             20mg                     C

ommon



     mg   mg   2-04                                              [facility]



               00:00:                                              - [facility]



               00                                                [facility]

 

     Kenalog Kenalog -      No             40mg                     Common



     (Triamcinol (Triamcinol 2-04                                              S

pirit



     one) one) 00:00:                                              - [facility]



               00                                                [facility]

 

     Hyalgan 20 Hyalgan 20 -      No             20mg                     C

ommon



     mg   mg   2-04                                              [facility]



               00:00:                                              - [facility]



               00                                                [facility]

 

     Bupivicaine Bupivicaine -      No             2.5mg                   

  Common



     Hydro Hydro 2-04                                              [facility]



               00:00:                                              - [facility]



               00                                                [facility]

 

     Bupivicaine Bupivicaine -      No             2.5mg                   

  Common



     Hydro Hydro 2-04                                              [facility]



               00:00:                                              - [facility]



               00                                                [facility]

 

     Kenalog Kenalog -      No             40mg                     Common



     (Triamcinol (Triamcinol 2-04                                              S

pirit



     one) one) 00:00:                                              - [facility]



               00                                                [facility]

 

     Hyalgan 20 Hyalgan 20 -      No             20mg                     C

ommon



     mg   mg   2-04                                              [facility]



               00:00:                                              - [facility]



               00                                                [facility]

 

     Kenalog Kenalog -      No             40mg                     Common



     (Triamcinol (Triamcinol 2-04                                              S

pirit



     one) one) 00:00:                                              - [facility]



               00                                                [facility]

 

     Hyalgan 20 Hyalgan 20 -      No             20mg                     C

ommon



     mg   mg   2-04                                              [facility]



               00:00:                                              - [facility]



               00                                                [facility]

 

     Bupivicaine Bupivicaine -      No                                     

 Common



     Hydro Hydro 2-04                                              [facility]



               00:00:                                              - [facility]



               00                                                [facility]

 

     Bupivicaine Bupivicaine -      No                                     

 Common



     Hydro Hydro 2-04                                              [facility]



               00:00:                                              - [facility]



               00                                                [facility]

 

     Kenalog Kenalog -      No             40mg                     Common



     (Triamcinol (Triamcinol 2-04                                              S

pirit



     one) one) 00:00:                                              - [facility]



               00                                                [facility]

 

     Hyalgan 20 Hyalgan 20 -      No             20mg                     C

ommon



     mg   mg   2-04                                              [facility]



               00:00:                                              - [facility]



               00                                                [facility]

 

     Kenalog Kenalog -      No             40mg                     Common



     (Triamcinol (Triamcinol 2-04                                              S

pirit



     one) one) 00:00:                                              - [facility]



               00                                                [facility]

 

     Hyalgan 20 Hyalgan 20 2020-      No             20mg                     C

ommon



     mg   mg   2-04                                              [facility]



               00:00:                                              - [facility]



               00                                                [facility]

 

     Bupivicaine Bupivicaine 2020-      No                                     

 Common



     Hydro Hydro 2-04                                              [facility]



               00:00:                                              - [facility]



               00                                                [facility]

 

     Bupivicaine Bupivicaine 2020-      No                                     

 Common



     Hydro Hydro 2-04                                              [facility]



               00:00:                                              - [facility]



               00                                                [facility]

 

     Kenalog Kenalog 2020-      No             40mg                     Common



     (Triamcinol (Triamcinol 2-04                                              S

pirit



     one) one) 00:00:                                              - [facility]



               00                                                [facility]

 

     Hyalgan 20 Hyalgan 20 -      No             20mg                     C

ommon



     mg   mg   2-04                                              [facility]



               00:00:                                              - [facility]



               00                                                [facility]

 

     Kenalog Kenalog -      No             40mg                     Common



     (Triamcinol (Triamcinol 2-04                                              S

pirit



     one) one) 00:00:                                              - [facility]



               00                                                [facility]

 

     Hyalgan 20 Hyalgan 20 -      No             20mg                     C

ommon



     mg   mg   2-04                                              [facility]



               00:00:                                              - [facility]



               00                                                [facility]

 

     Bupivicaine Bupivicaine -      No                                     

 Common



     Hydro Hydro 2-04                                              [facility]



               00:00:                                              - [facility]



               00                                                [facility]

 

     Bupivicaine Bupivicaine -      No                                     

 Common



     Hydro Hydro 2-04                                              [facility]



               00:00:                                              - [facility]



               00                                                [facility]

 

     Kenalog Kenalog -      No             40mg                     Common



     (Triamcinol (Triamcinol 2-04                                              S

pirit



     one) one) 00:00:                                              - [facility]



               00                                                [facility]

 

     Hyalgan 20 Hyalgan 20 -      No             20mg                     C

ommon



     mg   mg   2-04                                              [facility]



               00:00:                                              - [facility]



               00                                                [facility]

 

     Kenalog Kenalog -      No             40mg                     Common



     (Triamcinol (Triamcinol 2-04                                              S

pirit



     one) one) 00:00:                                              - [facility]



               00                                                [facility]

 

     Hyalgan 20 Hyalgan 20 -      No             20mg                     C

ommon



     mg   mg   2-04                                              [facility]



               00:00:                                              - [facility]



               00                                                [facility]

 

     Bupivicaine Bupivicaine -      No                                     

 Common



     Hydro Hydro 2-04                                              [facility]



               00:00:                                              - [facility]



               00                                                [facility]

 

     Bupivicaine Bupivicaine -      No                                     

 Common



     Hydro Hydro 2-04                                              [facility]



               00:00:                                              - [facility]



               00                                                [facility]

 

     Kenalog Kenalog -      No             40mg                     Common



     (Triamcinol (Triamcinol 2-04                                              S

pirit



     one) one) 00:00:                                              - [facility]



               00                                                [facility]

 

     Hyalgan 20 Hyalgan 20 -      No             20mg                     C

ommon



     mg   mg   2-04                                              [facility]



               00:00:                                              - [facility]



               00                                                [facility]

 

     Kenalog Kenalog -      No             40mg                     Common



     (Triamcinol (Triamcinol 2-04                                              S

pirit



     one) one) 00:00:                                              - [facility]



               00                                                [facility]

 

     Hyalgan 20 Hyalgan 20 -1      No             20mg                     C

ommon



     mg   mg   2-04                                              [facility]



               00:00:                                              - [facility]



               00                                                [facility]

 

     Bupivicaine Bupivicaine -      No                                     

 Common



     Hydro Hydro 2-04                                              [facility]



               00:00:                                              - [facility]



               00                                                [facility]

 

     Bupivicaine Bupivicaine -      No                                     

 Common



     Hydro Hydro 2-04                                              [facility]



               00:00:                                              - [facility]



               00                                                [facility]

 

     Kenalog Kenalog -      No             40mg                     Common



     (Triamcinol (Triamcinol 2-04                                              S

pirit



     one) one) 00:00:                                              - [facility]



               00                                                [facility]

 

     Hyalgan 20 Hyalgan 20 -      No             20mg                     C

ommon



     mg   mg   2-04                                              [facility]



               00:00:                                              - [facility]



               00                                                [facility]

 

     Kenalog Kenalog -      No             40mg                     Common



     (Triamcinol (Triamcinol 2-04                                              S

pirit



     one) one) 00:00:                                              - [facility]



               00                                                [facility]

 

     Hyalgan 20 Hyalgan 20 -      No             20mg                     C

ommon



     mg   mg   2-04                                              [facility]



               00:00:                                              - [facility]



               00                                                [facility]

 

     Bupivicaine Bupivicaine -      No                                     

 Common



     Hydro Hydro 2-04                                              [facility]



               00:00:                                              - [facility]



               00                                                [facility]

 

     Bupivicaine Bupivicaine -      No                                     

 Common



     Hydro Hydro 2-04                                              [facility]



               00:00:                                              - [facility]



               00                                                [facility]

 

     Kenalog Kenalog -      No             40mg                     Common



     (Triamcinol (Triamcinol 2-04                                              S

pirit



     one) one) 00:00:                                              - [facility]



               00                                                [facility]

 

     Hyalgan 20 Hyalgan 20 -      No             20mg                     C

ommon



     mg   mg   2-04                                              [facility]



               00:00:                                              - [facility]



               00                                                [facility]

 

     Kenalog Kenalog -      No             40mg                     Common



     (Triamcinol (Triamcinol 2-04                                              S

pirit



     one) one) 00:00:                                              - [facility]



               00                                                [facility]

 

     Hyalgan 20 Hyalgan 20 -      No             20mg                     C

ommon



     mg   mg   2-04                                              [facility]



               00:00:                                              - [facility]



               00                                                [facility]

 

     Bupivicaine Bupivicaine 2020-      No                                     

 Common



     Hydro Hydro 2-04                                              [facility]



               00:00:                                              - [facility]



               00                                                [facility]

 

     Bupivicaine Bupivicaine 2020-      No                                     

 Common



     Hydro Hydro 2-04                                              [facility]



               00:00:                                              - [facility]



               00                                                [facility]

 

     Kenalog Kenalog -      No             40mg                     Common



     (Triamcinol (Triamcinol 2-04                                              S

pirit



     one) one) 00:00:                                              - [facility]



               00                                                [facility]

 

     Hyalgan 20 Hyalgan 20 -1      No             20mg                     C

ommon



     mg   mg   2-04                                              [facility]



               00:00:                                              - [facility]



               00                                                [facility]

 

     Kenalog Kenalog -      No             40mg                     Common



     (Triamcinol (Triamcinol 2-04                                              S

pirit



     one) one) 00:00:                                              - [facility]



               00                                                [facility]

 

     Hyalgan 20 Hyalgan 20 -      No             20mg                     C

ommon



     mg   mg   2-04                                              [facility]



               00:00:                                              - [facility]



               00                                                [facility]

 

     Bupivicaine Bupivicaine -      No                                     

 Common



     Hydro Hydro 2-04                                              [facility]



               00:00:                                              - [facility]



               00                                                [facility]

 

     Bupivicaine Bupivicaine -      No                                     

 Common



     Hydro Hydro 2-04                                              [facility]



               00:00:                                              - [facility]



               00                                                [facility]

 

     Kenalog Kenalog -      No             40mg                     Common



     (Triamcinol (Triamcinol 2-04                                              S

[facility]irit



     one) one) 00:00:                                              - [facility]



               00                                                [facility]

 

     Hyalgan 20 Hyalgan 20 -      No             20mg                     C

ommon



     mg   mg   2-04                                              [facility]



               00:00:                                              - [facility]



               [facility]                                                [facility]

 

     Kenalog Kenalog -      No             40mg                     Common



     (Triamcinol (Triamcinol 2-04                                              S

pirit



     one) one) 00:00:                                              - [facility]



               [facility]                                                [facility]

 

     Hyalgan 20 Hyalgan 20 -      No             20mg                     C

ommon



     mg   mg   2-04                                              [facility]



               00:00:                                              - [facility]



               [facility]                                                [facility]

 

     Bupivicaine Bupivicaine -      No                                     

 Common



     Hydro Hydro 2-04                                              [facility]



               00:00:                                              - [facility]



               [facility]                                                [facility]

 

     Bupivicaine Bupivicaine 2020-      No                                     

 Common



     Hydro Hydro 2-04                                              [facility]



               00:00:                                              - [facility]



               [facility]                                                [facility]

 

     Kenalog Kenalog 2020-      No             40mg                     Common



     (Triamcinol (Triamcinol 2-04                                              S

pirit



     one) one) 00:00:                                              - [facility]



               [facility]                                                [facility]

 

     Hyalgan 20 Hyalgan 20 -      No             20mg                     C

ommon



     mg   mg   2-04                                              [facility]



               00:00:                                              - [facility]



               [facility]                                                [facility]

 

     Kenalog Kenalog 2020      No             40mg                     Common



     (Triamcinol (Triamcinol 2-04                                              S

pirit



     one) one) 00:00:                                              - [facility]



               [facility]                                                [facility]

 

     Hyalgan 20 Hyalgan 20 -      No             20mg                     C

ommon



     mg   mg   2-04                                              [facility]



               00:00:                                              - [facility]



               [facility]                                                [facility]

 

     Bupivicaine Bupivicaine -      No             2.5mg                   

  Common



     Hydro Hydro 2-04                                              [facility]



               00:00:                                              - [facility]



               [facility]                                                [facility]

 

     Bupivicaine Bupivicaine -      No             2.5mg                   

  Common



     Hydro Hydro 2-04                                              [facility]



               00:00:                                              - [facility]



               [facility]                                                [facility]

 

     Kenalog Kenalog -      No             40mg                     Common



     (Triamcinol (Triamcinol 2-04                                              S

pirit



     one) one) 00:00:                                              - [facility]



               [facility]                                                [facility]

 

     Hyalgan 20 Hyalgan 20 2020      No             20mg                     C

ommon



     mg   mg   2-04                                              [facility]



               00:00:                                              - [facility]



               [facility]                                                [facility]

 

     Kenalog Kenalog -      No             40mg                     Common



     (Triamcinol (Triamcinol 2-04                                              S

pirit



     one) one) 00:00:                                              - [facility]



               [facility]                                                [facility]

 

     Hyalgan 20 Hyalgan 20 -      No             20mg                     C

ommon



     mg   mg   2-04                                              [facility]



               00:00:                                              - [facility]



               [facility]                                                [facility]

 

     Bupivicaine Bupivicaine -      No             2.5mg                   

  Common



     Hydro Hydro 2-04                                              [facility]



               00:00:                                              - [facility]



               [facility]                                                [facility]

 

     Bupivicaine Bupivicaine -      No             2.5mg                   

  Common



     Hydro Hydro 2-04                                              [facility]



               00:00:                                              - [facility]



               [facility]                                                [facility]

 

     Kenalog Kenalog -      No             40mg                     Common



     (Triamcinol (Triamcinol 2-04                                              S

pirit



     one) one) 00:00:                                              - [facility]



               [facility]                                                [facility]

 

     Hyalgan 20 Hyalgan 20 -      No             20mg                     C

ommon



     mg   mg   2-04                                              [facility]



               00:00:                                              - [facility]



               [facility]                                                [facility]

 

     Kenalog Kenalog -      No             40mg                     Common



     (Triamcinol (Triamcinol 2-04                                              S

pirit



     one) one) 00:00:                                              - [facility]



               [facility]                                                [facility]

 

     Hyalgan 20 Hyalgan 20 -      No             20mg                     C

ommon



     mg   mg   2-04                                              [facility]



               00:00:                                              - [facility]



               [facility]                                                [facility]

 

     Bupivicaine Bupivicaine -      No             2.5mg                   

  Common



     Hydro Hydro 2-04                                              [facility]



               00:00:                                              - [facility]



               [facility]                                                [facility]

 

     Bupivicaine Bupivicaine -      No             2.5mg                   

  Common



     Hydro Hydro 2-04                                              [facility]



               00:00:                                              - [facility]



               [facility]                                                [facility]

 

     Kenalog Kenalog 2020      No             40mg                     Common



     (Triamcinol (Triamcinol 2-04                                              S

pirit



     one) one) 00:00:                                              - [facility]



               [facility]                                                [facility]

 

     Hyalgan 20 Hyalgan 20 -      No             20mg                     C

ommon



     mg   mg   2-04                                              [facility]



               00:00:                                              - [facility]



               [facility]                                                [facility]

 

     Kenalog Kenalog -      No             40mg                     Common



     (Triamcinol (Triamcinol 2-04                                              S

pirit



     one) one) 00:00:                                              - [facility]



               [facility]                                                [facility]

 

     Hyalgan 20 Hyalgan 20 -      No             20mg                     C

ommon



     mg   mg   2-04                                              [facility]



               00:00:                                              - [facility]



               [facility]                                                [facility]

 

     Bupivicaine Bupivicaine -      No             2.5mg                   

  Common



     Hydro Hydro 2-04                                              [facility]



               00:00:                                              - [facility]



               [facility]                                                [facility]

 

     Bupivicaine Bupivicaine -      No             2.5mg                   

  Common



     Hydro Hydro 2-04                                              [facility]



               00:00:                                              - [facility]



               [facility]                                                [facility]

 

     Kenalog Kenalog -      No             40mg                     Common



     (Triamcinol (Triamcinol 2-04                                              S

pirit



     one) one) 00:00:                                              - [facility]



               [facility]                                                [facility]

 

     Hyalgan 20 Hyalgan 20 -      No             20mg                     C

ommon



     mg   mg   2-04                                              [facility]



               00:00:                                              - [facility]



               [facility]                                                [facility]

 

     Kenalog Kenalog -      No             40mg                     Common



     (Triamcinol (Triamcinol 2-04                                              S

pirit



     one) one) 00:00:                                              - [facility]



               [facility]                                                [facility]

 

     Hyalgan 20 Hyalgan 20 -1      No             20mg                     C

ommon



     mg   mg   2-04                                              [facility]



               00:00:                                              - [facility]



               [facility]                                                [facility]

 

     Bupivicaine Bupivicaine -      No             2.5mg                   

  Common



     Hydro Hydro 2-04                                              [facility]



               00:00:                                              - [facility]



               [facility]                                                [facility]

 

     Bupivicaine Bupivicaine -      No             2.5mg                   

  Common



     Hydro Hydro 2-04                                              [facility]



               00:00:                                              - [facility]



               [facility]                                                [facility]

 

     Kenalog Kenalog 2020      No             40mg                     Common



     (Triamcinol (Triamcinol 2-04                                              S

pirit



     one) one) 00:00:                                              - [facility]



               [facility]                                                [facility]

 

     PredniSONE PredniSONE -2020- No   [place]                one tablet  

         Common



               7-15 07-25                          with food           [facility]



               00:00: 00:00                                         - [facility]



               00   :00                                          [facility]

 

     Kenalog Kenalog -      No             40mg                     Common



     (Triamcinol (Triamcinol 2-20                                              S

pirit



     one) one) 00:00:                                              - [facility]



               [facility]                                                [facility]

 

     Kenalog Kenalog -      No             40mg                     Common



     (Triamcinol (Triamcinol 2-20                                              S

pirit



     one) one) 00:00:                                              - [facility]



               [facility]                                                [facility]

 

     Kenalog Kenalog -      No             40mg                     Common



     (Triamcinol (Triamcinol 2-20                                              S

pirit



     one) one) 00:00:                                              - [facility]



               [facility]                                                [facility]

 

     Kenalog Kenalog -      No             40mg                     Common



     (Triamcinol (Triamcinol 2-20                                              S

pirit



     one) one) 00:00:                                              - [facility]



               [facility]                                                [facility]

 

     Kenalog Kenalog 2019-      No             40mg                     Common



     (Triamcinol (Triamcinol 2-20                                              S

pirit



     one) one) 00:00:                                              - [facility]



               [facility]                                                [facility]

 

     Kenalog Kenalog 2019-      No             40mg                     Common



     (Triamcinol (Triamcinol 2-20                                              S

pirit



     one) one) 00:00:                                              - [facility]



               [facility]                                                [facility]

 

     Kenalog Kenalog 2019-      No             40mg                     Common



     (Triamcinol (Triamcinol 2-20                                              S

pirit



     one) one) 00:00:                                              - [facility]



               [facility]                                                [facility]

 

     Kenalog Kenalog 2019-      No             40mg                     Common



     (Triamcinol (Triamcinol 2-20                                              S

pirit



     one) one) 00:00:                                              - [facility]



               [facility]                                                [facility]

 

     Kenalog Kenalog 2019-      No             40mg                     Common



     (Triamcinol (Triamcinol 2-20                                              S

pirit



     one) one) 00:00:                                              - [facility]



               [facility]                                                [facility]

 

     Kenalog Kenalog 2019-      No             40mg                     Common



     (Triamcinol (Triamcinol 2-20                                              S

pirit



     one) one) 00:00:                                              - [facility]



               [facility]                                                [facility]

 

     Kenalog Kenalog 2019-1      No             40mg                     Common



     (Triamcinol (Triamcinol 2-20                                              S

pirit



     one) one) 00:00:                                              - [facility]



               [facility]                                                [facility]

 

     Kenalog Kenalog 2019-      No             40mg                     Common



     (Triamcinol (Triamcinol 2-20                                              S

pirit



     one) one) 00:00:                                              - [facility]



               [facility]                                                [facility]

 

     Kenalog Kenalog 2019-      No             40mg                     Common



     (Triamcinol (Triamcinol 2-20                                              S

pirit



     one) one) 00:00:                                              - [facility]



               [facility]                                                [facility]

 

     Kenalog Kenalog 2019-      No             40mg                     Common



     (Triamcinol (Triamcinol 2-20                                              S

pirit



     one) one) 00:00:                                              - [facility]



               [facility]                                                [facility]

 

     Kenalog Kenalog 2019-      No             40mg                     Common



     (Triamcinol (Triamcinol 2-20                                              S

pirit



     one) one) 00:00:                                              - [facility]



               [facility]                                                [facility]

 

     Kenalog Kenalog 2019-1      No             40mg                     Common



     (Triamcinol (Triamcinol 2-20                                              S

pirit



     one) one) 00:00:                                              - [facility]



               [facility]                                                [facility]

 

     Kenalog Kenalog 2019-1      No             40mg                     Common



     (Triamcinol (Triamcinol 2-20                                              S

pirit



     one) one) 00:00:                                              - [facility]



               [facility]                                                [facility]

 

     Kenalog Kenalog 2019-1      No             40mg                     Common



     (Triamcinol (Triamcinol 2-20                                              S

pirit



     one) one) 00:00:                                              - [facility]



               [facility]                                                [facility]

 

     Kenalog Kenalog 2019-1      No             40mg                     Common



     (Triamcinol (Triamcinol 2-20                                              S

pirit



     one) one) 00:00:                                              - [facility]



               [facility]                                                [facility]

 

     Kenalog Kenalog 2019-1      No             40mg                     Common



     (Triamcinol (Triamcinol 2-20                                              S

pirit



     one) one) 00:00:                                              - [facility]



               [facility]                                                [facility]

 

     Kenalog Kenalog 2019-1      No             40mg                     Common



     (Triamcinol (Triamcinol 2-20                                              S

pirit



     one) one) 00:00:                                              - [facility]



               [facility]                                                [facility]

 

     Kenalog Kenalog 2019-1      No             40mg                     Common



     (Triamcinol (Triamcinol 2-20                                              S

pirit



     one) one) 00:00:                                              - [facility]



               [facility]                                                [facility]

 

     Kenalog Kenalog 2019-      No             40mg                     Common



     (Triamcinol (Triamcinol 2-20                                              S

pirit



     one) one) 00:00:                                              - [facility]



               00                                                [facility]

 

     Kenalog Kenalog 2019-      No             40mg                     Common



     (Triamcinol (Triamcinol 2-20                                              S

pirit



     one) one) 00:00:                                              - [facility]



               [facility]                                                [facility]

 

     Kenalog Kenalog 2019-      No             40mg                     Common



     (Triamcinol (Triamcinol 2-20                                              S

pirit



     one) one) 00:00:                                              - [facility]



               [facility]                                                [facility]

 

     Kenalog Kenalog 2019-      No             40mg                     Common



     (Triamcinol (Triamcinol 2-20                                              S

pirit



     one) one) 00:00:                                              - [facility]



               [facility]                                                [facility]

 

     Kenalog Kenalog 2019-      No             40mg                     Common



     (Triamcinol (Triamcinol 2-20                                              S

[facility]irit



     one) one) 00:00:                                              - [facility]



               [facility]                                                [facility]

 

     Kenalog Kenalog 2019-      No             40mg                     Common



     (Triamcinol (Triamcinol 2-20                                              S

pirit



     one) one) 00:00:                                              - [facility]



               [facility]                                                [facility]

 

     Kenalog Kenalog 2019-      No             40mg                     Common



     (Triamcinol (Triamcinol 2-20                                              S

pirit



     one) one) 00:00:                                              - [facility]



               [facility]                                                [facility]

 

     Kenalog Kenalog 2019-      No             40mg                     Common



     (Triamcinol (Triamcinol 2-20                                              S

pirit



     one) one) 00:00:                                              - [facility]



               [facility]                                                [facility]

 

     Kenalog Kenalog 2019-1      No             40mg                     Common



     (Triamcinol (Triamcinol 2-20                                              S

[facility]irit



     one) one) 00:00:                                              - [facility]



               [facility]                                                [facility]

 

     LIDOCAINE LIDOCAINE 2019-0      No             10mg                     Com

mon



     HCL 10MG/ML HCL 10MG/ML 5                                              S

[facility]



               00:00:                                              - [facility]



                                                               [facility]

 

     Hyalgan 20 Hyalgan 20 2019-0      No             20mg                     C

ommon



     mg   mg                                                 [facility]



               00:00:                                              - [facility]



                                                               [facility]

 

     Hyalgan 20 Hyalgan 20 2019-0      No             20mg                     C

ommon



     mg   mg                                                 [facility]



               00:00:                                              - [facility]



                                                               [facility]

 

     LIDOCAINE LIDOCAINE 2019-0      No             10mg                     Com

mon



     HCL 10MG/ML HCL 10MG/ML 5                                              S

[facility]irit



               00:00:                                              - [facility]



                                                               [facility]

 

     LIDOCAINE LIDOCAINE 2019-0      No             10mg                     Com

mon



     HCL 10MG/ML HCL 10MG/ML                                               S

[facility]irit



               00:00:                                              - [facility]



                                                               [facility]

 

     Hyalgan 20 Hyalgan 20 2019-0      No             20mg                     C

ommon



     mg   mg                                                 [facility]



               00:00:                                              - [facility]



                                                               [facility]

 

     Hyalgan 20 Hyalgan 20 2019-0      No             20mg                     C

[facility]



     mg   mg                                                 [facility]



               00:00:                                              - [facility]



                                                               [facility]

 

     LIDOCAINE LIDOCAINE 2019-0      No             10mg                     Com

mon



     HCL 10MG/ML HCL 10MG/ML                                               S

pirit



               00:00:                                              - [facility]



                                                               [facility]

 

     LIDOCAINE LIDOCAINE 2019-0      No             10mg                     Com

mon



     HCL 10MG/ML HCL 10MG/ML                                               S

[facility]



               00:00:                                              - [facility]



                                                               [facility]

 

     Hyalgan 20 Hyalgan 20 2019-0      No             20mg                     C

ommon



     mg   mg                                                 [facility]



               00:00:                                              - [facility]



                                                               [facility]

 

     Hyalgan 20 Hyalgan 20 2019-0      No             20mg                     C

ommon



     mg   mg                                                 [facility]



               00:00:                                              - [facility]



                                                               [facility]

 

     LIDOCAINE LIDOCAINE 2019-0      No             10mg                     Com

mon



     HCL 10MG/ML HCL 10MG/ML                                               S

[facility]



               00:00:                                              - [facility]



                                                               [facility]

 

     LIDOCAINE LIDOCAINE 2019-0      No             10mg                     Com

mon



     HCL 10MG/ML HCL 10MG/ML                                               S

[facility]



               00:00:                                              - [facility]



                                                               [facility]

 

     Hyalgan 20 Hyalgan 20 2019-0      No             20mg                     C

ommon



     mg   mg                                                 [facility]



               00:00:                                              - [facility]



                                                               [facility]

 

     Hyalgan 20 Hyalgan 20 2019-0      No             20mg                     C

ommon



     mg   mg                                                 [facility]



               00:00:                                              - [facility]



                                                               [facility]

 

     LIDOCAINE LIDOCAINE 2019-0      No             10mg                     Com

mon



     HCL 10MG/ML HCL 10MG/ML 5                                              S

[facility]irit



               00:00:                                              - [facility]



                                                               [facility]

 

     LIDOCAINE LIDOCAINE 2019-0      No             10mg                     Com

mon



     HCL 10MG/ML HCL 10MG/ML                                               S

[facility]



               00:00:                                              - [facility]



                                                               [facility]

 

     Hyalgan 20 Hyalgan 20 2019-0      No             20mg                     C

om[facility]



     mg   mg                                                 [facility]



               00:00:                                              - [facility]



                                                               [facility]

 

     Hyalgan 20 Hyalgan 20 2019-0      No             20mg                     C

om[facility]



     mg   mg                                                 [facility]



               00:00:                                              - [facility]



                                                               [facility]

 

     LIDOCAINE LIDOCAINE 2019-0      No             10mg                     Com

mon



     HCL 10MG/ML HCL 10MG/ML                                               S

[facility]



               00:00:                                              - [facility]



                                                               [facility]

 

     LIDOCAINE LIDOCAINE 2019-0      No             10mg                     Com

mon



     HCL 10MG/ML HCL 10MG/ML                                               S

[facility]



               00:00:                                              - [facility]



                                                               [facility]

 

     Hyalgan 20 Hyalgan 20 2019-0      No             20mg                     C

om[facility]



     mg   mg                                                 [facility]



               00:00:                                              - [facility]



                                                               [facility]

 

     Hyalgan 20 Hyalgan 20 2019-0      No             20mg                     C

om[facility]



     mg   mg                                                 [facility]



               00:00:                                              - [facility]



                                                               [facility]

 

     LIDOCAINE LIDOCAINE 2019-0      No             10mg                     Com

mon



     HCL 10MG/ML HCL 10MG/ML                                               S

[facility]irit



               00:00:                                              - [facility]



                                                               [facility]

 

     LIDOCAINE LIDOCAINE 2019-0      No             10mg                     Com

mon



     HCL 10MG/ML HCL 10MG/ML                                               S

[facility]



               00:00:                                              - [facility]



                                                               [facility]

 

     Hyalgan 20 Hyalgan 20 2019-0      No             20mg                     C

[facility]



     mg   mg                                                 [facility]



               00:00:                                              - [facility]



                                                               [facility]

 

     Hyalgan 20 Hyalgan 20 2019-0      No             20mg                     C

om[facility]



     mg   mg                                                 [facility]



               00:00:                                              - [facility]



                                                               [facility]

 

     LIDOCAINE LIDOCAINE 2019-0      No             10mg                     Com

mon



     HCL 10MG/ML HCL 10MG/ML 5-                                              S

[facility]irit



               00:00:                                              - [facility]



                                                               [facility]

 

     LIDOCAINE LIDOCAINE 2019-0      No             10mg                     Com

mon



     HCL 10MG/ML HCL 10MG/ML 5                                              S

[facility]



               00:00:                                              - [facility]



                                                               [facility]

 

     Hyalgan 20 Hyalgan 20 2019-0      No             20mg                     C

om[facility]



     mg   mg                                                 [facility]



               00:00:                                              - [facility]



                                                               [facility]

 

     Hyalgan 20 Hyalgan 20 2019-0      No             20mg                     C

om[facility]



     mg   mg                                                 [facility]



               00:00:                                              - [facility]



                                                               [facility]

 

     LIDOCAINE LIDOCAINE 2019-0      No             10mg                     Com

mon



     HCL 10MG/ML HCL 10MG/ML 5                                              S

[facility]irit



               00:00:                                              - [facility]



                                                               [facility]

 

     LIDOCAINE LIDOCAINE 2019-0      No             10mg                     Com

mon



     HCL 10MG/ML HCL 10MG/ML                                               S

[facility]



               00:00:                                              - [facility]



                                                               [facility]

 

     Hyalgan 20 Hyalgan 20 2019-0      No             20mg                     C

om[facility]



     mg   mg                                                 [facility]



               00:00:                                              - [facility]



                                                               [facility]

 

     Hyalgan 20 Hyalgan 20 2019-0      No             20mg                     C

om[facility]



     mg   mg                                                 [facility]



               00:00:                                              - [facility]



                                                               [facility]

 

     LIDOCAINE LIDOCAINE 2019-0      No             10mg                     Com

mon



     HCL 10MG/ML HCL 10MG/ML                                               S

[facility]



               00:00:                                              - [facility]



                                                               [facility]

 

     LIDOCAINE LIDOCAINE 2019-0      No             10mg                     Com

mon



     HCL 10MG/ML HCL 10MG/ML                                               S

[facility]



               00:00:                                              - [facility]



                                                               [facility]

 

     Hyalgan 20 Hyalgan 20 2019-0      No             20mg                     C

om[facility]



     mg   mg                                                 [facility]



               00:00:                                              - [facility]



                                                               [facility]

 

     Hyalgan 20 Hyalgan 20 2019-0      No             20mg                     C

[facility]



     mg   mg                                                 [facility]



               00:00:                                              - [facility]



                                                               [facility]

 

     LIDOCAINE LIDOCAINE 2019-0      No             10mg                     Com

mon



     HCL 10MG/ML HCL 10MG/ML                                               S

[facility]irit



               00:00:                                              - [facility]



                                                               [facility]

 

     LIDOCAINE LIDOCAINE 2019-0      No             10mg                     Com

mon



     HCL 10MG/ML HCL 10MG/ML 5                                              S

[facility]



               00:00:                                              - [facility]



                                                               [facility]

 

     Hyalgan 20 Hyalgan 20 2019-0      No             20mg                     C

[facility]



     mg   mg                                                 [facility]



               00:00:                                              - [facility]



                                                               [facility]

 

     Hyalgan 20 Hyalgan 20 2019-0      No             20mg                     C

om[facility]



     mg   mg                                                 [facility]



               00:00:                                              - [facility]



                                                               [facility]

 

     LIDOCAINE LIDOCAINE 2019-0      No             10mg                     Com

mon



     HCL 10MG/ML HCL 10MG/ML 5-                                              S

[facility]



               00:00:                                              - [facility]



                                                               [facility]

 

     LIDOCAINE LIDOCAINE 2019-0      No             10mg                     Com

mon



     HCL 10MG/ML HCL 10MG/ML 5-                                              S

[facility]



               00:00:                                              - [facility]



                                                               [facility]

 

     Hyalgan 20 Hyalgan 20 2019-0      No             20mg                     C

[facility]



     mg   mg                                                 [facility]



               00:00:                                              - [facility]



                                                               [facility]

 

     Hyalgan 20 Hyalgan 20 2019-0      No             20mg                     C

[facility]



     mg   mg                                                 [facility]



               00:00:                                              - [facility]



                                                               [facility]

 

     LIDOCAINE LIDOCAINE 2019-0      No             10mg                     Com

mon



     HCL 10MG/ML HCL 10MG/ML 5                                              S

[facility]irit



               00:00:                                              - [facility]



                                                               [facility]

 

     LIDOCAINE LIDOCAINE 2019-0      No             10mg                     Com

mon



     HCL 10MG/ML HCL 10MG/ML 5                                              S

[facility]



               00:00:                                              - [facility]



                                                               [facility]

 

     Hyalgan 20 Hyalgan 20 2019-0      No             20mg                     C

[facility]



     mg   mg                                                 [facility]



               00:00:                                              - [facility]



                                                               [facility]

 

     Hyalgan 20 Hyalgan 20 2019-0      No             20mg                     C

om[facility]



     mg   mg                                                 [facility]



               00:00:                                              - [facility]



                                                               [facility]

 

     LIDOCAINE LIDOCAINE 2019-0      No             10mg                     Com

mon



     HCL 10MG/ML HCL 10MG/ML                                               S

[facility]



               00:00:                                              - [facility]



                                                               [facility]

 

     LIDOCAINE LIDOCAINE 2019-0      No             10mg                     Com

mon



     HCL 10MG/ML HCL 10MG/ML                                               S

[facility]



               00:00:                                              - [facility]



                                                               [facility]

 

     Hyalgan 20 Hyalgan 20 2019-0      No             20mg                     C

[facility]



     mg   mg                                                 [facility]



               00:00:                                              - [facility]



                                                               [facility]

 

     Hyalgan 20 Hyalgan 20 2019-0      No             20mg                     C

[facility]



     mg   mg                                                 [facility]



               00:00:                                              - [facility]



                                                               [facility]

 

     LIDOCAINE LIDOCAINE 2019-0      No             10mg                     Com

mon



     HCL 10MG/ML HCL 10MG/ML 5-                                              S

[facility]irit



               00:00:                                              - [facility]



                                                               [facility]

 

     LIDOCAINE LIDOCAINE 2019-0      No             10mg                     Com

mon



     HCL 10MG/ML HCL 10MG/ML 5-                                              S

[facility]



               00:00:                                              - [facility]



                                                               [facility]

 

     Hyalgan 20 Hyalgan 20 2019-0      No             20mg                     C

[facility]



     mg   mg                                                 [facility]



               00:00:                                              - [facility]



                                                               [facility]

 

     Hyalgan 20 Hyalgan 20 2019-0      No             20mg                     C

[facility]



     mg   mg                                                 [facility]



               00:00:                                              - [facility]



                                                               [facility]

 

     LIDOCAINE LIDOCAINE 2019-0      No             10mg                     Com

mon



     HCL 10MG/ML HCL 10MG/ML                                               S

[facility]



               00:00:                                              - [facility]



                                                               [facility]

 

     LIDOCAINE LIDOCAINE 2019-0      No             10mg                     Com

mon



     HCL 10MG/ML HCL 10MG/ML                                               S

[facility]



               00:00:                                              - [facility]



                                                               [facility]

 

     Hyalgan 20 Hyalgan 20 2019-0      No             20mg                     C

[facility]



     mg   mg                                                 [facility]



               00:00:                                              - [facility]



                                                               [facility]

 

     Hyalgan 20 Hyalgan 20 2019-0      No             20mg                     C

[facility]



     mg   mg                                                 [facility]



               00:00:                                              - [facility]



                                                               [facility]

 

     LIDOCAINE LIDOCAINE 2019-0      No             10mg                     Com

mon



     HCL 10MG/ML HCL 10MG/ML                                               S

[facility]



               00:00:                                              - [facility]



                                                               [facility]

 

     LIDOCAINE LIDOCAINE 2019-0      No             10mg                     Com

mon



     HCL 10MG/ML HCL 10MG/ML                                               S

[facility]



               00:00:                                              - [facility]



                                                               [facility]

 

     Hyalgan 20 Hyalgan 20 2019-0      No             20mg                     C

[facility]



     mg   mg                                                 [facility]



               00:00:                                              - [facility]



                                                               [facility]

 

     Hyalgan 20 Hyalgan 20 2019-0      No             20mg                     C

[facility]



     mg   mg                                                 [facility]



               00:00:                                              - [facility]



                                                               [facility]

 

     LIDOCAINE LIDOCAINE 2019-0      No             10mg                     Com

mon



     HCL 10MG/ML HCL 10MG/ML                                               S

[facility]



               00:00:                                              - [facility]



                                                               [facility]

 

     LIDOCAINE LIDOCAINE 2019-0      No             10mg                     Com

mon



     HCL 10MG/ML HCL 10MG/ML 5                                              S

[facility]



               00:00:                                              - [facility]



                                                               [facility]

 

     Hyalgan 20 Hyalgan 20 2019-0      No             20mg                     C

[facility]



     mg   mg                                                 [facility]



               00:00:                                              - [facility]



                                                               [facility]

 

     Hyalgan 20 Hyalgan 20 2019-0      No             20mg                     C

[facility]



     mg   mg                                                 [facility]



               00:00:                                              - [facility]



                                                               [facility]

 

     LIDOCAINE LIDOCAINE 2019-0      No             10mg                     Com

mon



     HCL 10MG/ML HCL 10MG/ML 5-                                              S

[facility]



               00:00:                                              - [facility]



                                                               [facility]

 

     LIDOCAINE LIDOCAINE 2019-0      No             10mg                     Com

mon



     HCL 10MG/ML HCL 10MG/ML 5                                              S

[facility]



               00:00:                                              - [facility]



                                                               [facility]

 

     Hyalgan 20 Hyalgan 20 2019-0      No             20mg                     C

[facility]



     mg   mg                                                 [facility]



               00:00:                                              - [facility]



                                                               [facility]

 

     Hyalgan 20 Hyalgan 20 2019-0      No             20mg                     C

[facility]



     mg   mg                                                 [facility]



               00:00:                                              - [facility]



                                                               [facility]

 

     LIDOCAINE LIDOCAINE 2019-0      No             10mg                     Com

mon



     HCL 10MG/ML HCL 10MG/ML 5                                              S

[facility]



               00:00:                                              - [facility]



                                                               [facility]

 

     LIDOCAINE LIDOCAINE 2019-0      No             10mg                     Com

mon



     HCL 10MG/ML HCL 10MG/ML 5                                              S

[facility]



               00:00:                                              - [facility]



                                                               [facility]

 

     Hyalgan 20 Hyalgan 20 2019-0      No             20mg                     C

[facility]



     mg   mg                                                 [facility]



               00:00:                                              - [facility]



                                                               [facility]

 

     Hyalgan 20 Hyalgan 20 2019-0      No             20mg                     C

[facility]



     mg   mg                                                 [facility]



               00:00:                                              - [facility]



                                                               [facility]

 

     LIDOCAINE LIDOCAINE 2019-0      No             10mg                     Com

mon



     HCL 10MG/ML HCL 10MG/ML                                               S

[facility]



               00:00:                                              - [facility]



                                                               [facility]

 

     LIDOCAINE LIDOCAINE 2019-0      No             10mg                     Com

mon



     HCL 10MG/ML HCL 10MG/ML                                               S

[facility]



               00:00:                                              - [facility]



                                                               [facility]

 

     Hyalgan 20 Hyalgan 20 2019-0      No             20mg                     C

[facility]



     mg   mg                                                 [facility]



               00:00:                                              - [facility]



                                                               [facility]

 

     Hyalgan 20 Hyalgan 20 2019-0      No             20mg                     C

[facility]



     mg   mg                                                 [facility]



               00:00:                                              - [facility]



                                                               [facility]

 

     LIDOCAINE LIDOCAINE 2019-0      No             10mg                     Com

mon



     HCL 10MG/ML HCL 10MG/ML 5-                                              S

[facility]



               00:00:                                              - [facility]



                                                               [facility]

 

     LIDOCAINE LIDOCAINE 2019-0      No             10mg                     Com

mon



     HCL 10MG/ML HCL 10MG/ML 5-                                              S

[facility]



               00:00:                                              - [facility]



                                                               [facility]

 

     Hyalgan 20 Hyalgan 20 2019-0      No             20mg                     C

[facility]



     mg   mg                                                 [facility]



               00:00:                                              - [facility]



                                                               [facility]

 

     Hyalgan 20 Hyalgan 20 2019-0      No             20mg                     C

[facility]



     mg   mg                                                 [facility]



               00:00:                                              - [facility]



                                                               [facility]

 

     LIDOCAINE LIDOCAINE 2019-0      No             10mg                     Com

mon



     HCL 10MG/ML HCL 10MG/ML 5                                              S

[facility]



               00:00:                                              - [facility]



                                                               [facility]

 

     LIDOCAINE LIDOCAINE 2019-0      No             10mg                     Com

mon



     HCL 10MG/ML HCL 10MG/ML                                               S

[facility]



               00:00:                                              - [facility]



                                                               [facility]

 

     Hyalgan 20 Hyalgan 20 2019-0      No             20mg                     C

[facility]



     mg   mg                                                 [facility]



               00:00:                                              - [facility]



                                                               [facility]

 

     Hyalgan 20 Hyalgan 20 2019-0      No             20mg                     C

[facility]



     mg   mg                                                 [facility]



               00:00:                                              - [facility]



                                                               [facility]

 

     LIDOCAINE LIDOCAINE 2019-0      No             10mg                     Com

mon



     HCL 10MG/ML HCL 10MG/ML 5                                              S

[facility]



               00:00:                                              - [facility]



                                                               [facility]

 

     LIDOCAINE LIDOCAINE 2019-0      No             10mg                     Com

mon



     HCL 10MG/ML HCL 10MG/ML                                               S

[facility]



               00:00:                                              - [facility]



                                                               [facility]

 

     Hyalgan 20 Hyalgan 20 2019-0      No             20mg                     C

[facility]



     mg   mg                                                 [facility]



               00:00:                                              - [facility]



                                                               [facility]

 

     Hyalgan 20 Hyalgan 20 2019-0      No             20mg                     C

[facility]



     mg   mg                                                 [facility]



               00:00:                                              - [facility]



                                                               [facility]

 

     LIDOCAINE LIDOCAINE 2019-0      No             10mg                     Com

mon



     HCL 10MG/ML HCL 10MG/ML                                               S

[facility]



               00:00:                                              - [facility]



                                                               [facility]

 

     LIDOCAINE LIDOCAINE 2019-0      No             10mg                     Com

mon



     HCL 10MG/ML HCL 10MG/ML 5                                              S

[facility]



               00:00:                                              - [facility]



                                                               [facility]

 

     Hyalgan 20 Hyalgan 20 2019-0      No             20mg                     C

[facility]



     mg   mg                                                 [facility]



               00:00:                                              - [facility]



                                                               [facility]

 

     Hyalgan 20 Hyalgan 20 2019-0      No             20mg                     C

[facility]



     mg   mg                                                 [facility]



               00:00:                                              - [facility]



                                                               [facility]

 

     LIDOCAINE LIDOCAINE 2019-0      No             10mg                     Com

mon



     HCL 10MG/ML HCL 10MG/ML 5-                                              S

[facility]



               00:00:                                              - [facility]



                                                               [facility]

 

     LIDOCAINE LIDOCAINE 2019-0      No             10mg                     Com

mon



     HCL 10MG/ML HCL 10MG/ML 5                                              S

[facility]



               00:00:                                              - [facility]



                                                               [facility]

 

     Hyalgan 20 Hyalgan 20 2019-0      No             20mg                     C

[facility]



     mg   mg                                                 [facility]



               00:00:                                              - [facility]



                                                               [facility]

 

     Hyalgan 20 Hyalgan 20 2019-0      No             20mg                     C

[facility]



     mg   mg                                                 [facility]



               00:00:                                              - [facility]



                                                               [facility]

 

     LIDOCAINE LIDOCAINE 2019-0      No             10mg                     Com

mon



     HCL 10MG/ML HCL 10MG/ML                                               S

[facility]



               00:00:                                              - [facility]



                                                               [facility]

 

     LIDOCAINE LIDOCAINE 2019-0      No             10mg                     Com

mon



     HCL 10MG/ML HCL 10MG/ML                                               S

[facility]



               00:00:                                              - [facility]



                                                               [facility]

 

     Hyalgan 20 Hyalgan 20 2019-0      No             20mg                     C

[facility]



     mg   mg                                                 [facility]



               00:00:                                              - [facility]



                                                               [facility]

 

     Hyalgan 20 Hyalgan 20 2019-0      No             20mg                     C

[facility]



     mg   mg                                                 [facility]



               00:00:                                              - [facility]



                                                               [facility]

 

     LIDOCAINE LIDOCAINE 2019-0      No             10mg                     Com

mon



     HCL 10MG/ML HCL 10MG/ML 5                                              S

[facility]



               00:00:                                              - [facility]



                                                               [facility]

 

     LIDOCAINE LIDOCAINE 2019-0      No             10mg                     Com

mon



     HCL 10MG/ML HCL 10MG/ML                                               S

[facility]



               00:00:                                              - [facility]



                                                               [facility]

 

     Hyalgan 20 Hyalgan 20 2019-0      No             20mg                     C

[facility]



     mg   mg                                                 [facility]



               00:00:                                              - [facility]



                                                               [facility]

 

     Hyalgan 20 Hyalgan 20 2019-0      No             20mg                     C

[facility]



     mg   mg                                                 [facility]



               00:00:                                              - [facility]



                                                               [facility]

 

     LIDOCAINE LIDOCAINE 2019-0      No             10mg                     Com

mon



     HCL 10MG/ML HCL 10MG/ML                                               S

[facility]



               00:00:                                              - [facility]



                                                               [facility]

 

     LIDOCAINE LIDOCAINE 2019-0      No             10mg                     Com

mon



     HCL 10MG/ML HCL 10MG/ML 5                                              S

[facility]



               00:00:                                              - [facility]



                                                               [facility]

 

     Hyalgan 20 Hyalgan 20 2019-0      No             20mg                     C

[facility]



     mg   mg                                                 [facility]



               00:00:                                              - [facility]



                                                               [facility]

 

     Hyalgan 20 Hyalgan 20 2019-0      No             20mg                     C

[facility]



     mg   mg                                                 [facility]



               00:00:                                              - [facility]



                                                               [facility]

 

     LIDOCAINE LIDOCAINE 2019-0      No             10mg                     Com

mon



     HCL 10MG/ML HCL 10MG/ML                                               S

[facility]



               00:00:                                              - [facility]



                                                               [facility]

 

     LIDOCAINE LIDOCAINE 2019-0      No             10mg                     Com

mon



     HCL 10MG/ML HCL 10MG/ML                                               S

[facility]



               00:00:                                              - [facility]



                                                               [facility]

 

     Hyalgan 20 Hyalgan 20 2019-0      No             20mg                     C

[facility]



     mg   mg                                                 [facility]



               00:00:                                              - [facility]



                                                               [facility]

 

     Hyalgan 20 Hyalgan 20 2019-0      No             20mg                     C

[facility]



     mg   mg                                                 [facility]



               00:00:                                              - [facility]



                                                               [facility]

 

     LIDOCAINE LIDOCAINE 2019-0      No             10mg                     Com

mon



     HCL 10MG/ML HCL 10MG/ML                                               S

[facility]



               00:00:                                              - [facility]



                                                               [facility]

 

     LIDOCAINE LIDOCAINE 2019-0      No             10mg                     Com

mon



     HCL 10MG/ML HCL 10MG/ML                                               S

[facility]



               00:00:                                              - [facility]



                                                               [facility]

 

     Hyalgan 20 Hyalgan 20 2019-0      No             20mg                     C

[facility]



     mg   mg                                                 [facility]



               00:00:                                              - [facility]



                                                               [facility]

 

     Hyalgan 20 Hyalgan 20 2019-0      No             20mg                     C

[facility]



     mg   mg                                                 [facility]



               00:00:                                              - [facility]



                                                               [facility]

 

     LIDOCAINE LIDOCAINE 2019-0      No             10mg                     Com

mon



     HCL 10MG/ML HCL 10MG/ML                                               S

[facility]



               00:00:                                              - [facility]



                                                               [facility]

 

     Hyalgan 20 Hyalgan 20 2019-0      No             20mg                     C

[facility]



     mg   mg                                                 [facility]



               00:00:                                              - [facility]



                                                               [facility]

 

     Hyalgan 20 Hyalgan 20 2019-0      No             20mg                     C

[facility]



     mg   mg                                                 [facility]



               00:00:                                              - [facility]



                                                               [facility]

 

     LIDOCAINE LIDOCAINE 2019-0      No             10mg                     Com

mon



     HCL 10MG/ML HCL 10MG/ML                                               S

[facility]



               00:00:                                              - [facility]



                                                               [facility]

 

     LIDOCAINE LIDOCAINE 2019-0      No             10mg                     Com

mon



     HCL 10MG/ML HCL 10MG/ML 4-25                                              S

[facility]



               00:00:                                              - [facility]



                                                               [facility]

 

     Hyalgan 20 Hyalgan 20 2019-0      No             20mg                     C

[facility]



     mg   mg   4-25                                              [facility]



               00:00:                                              - [facility]



                                                               [facility]

 

     Hyalgan 20 Hyalgan 20 2019-0      No             20mg                     C

[facility]



     mg   mg   4-25                                              [facility]



               00:00:                                              - [facility]



                                                               [facility]

 

     LIDOCAINE LIDOCAINE 2019-0      No             10mg                     Com

mon



     HCL 10MG/ML HCL 10MG/ML 4-25                                              S

[facility]



               00:00:                                              - [facility]



                                                               [facility]

 

     LIDOCAINE LIDOCAINE 2019-0      No             10mg                     Com

mon



     HCL 10MG/ML HCL 10MG/ML 4-25                                              S

[facility]



               00:00:                                              - [facility]



                                                               [facility]

 

     Hyalgan 20 Hyalgan 20 2019-0      No             20mg                     C

[facility]



     mg   mg   4-25                                              [facility]



               00:00:                                              - [facility]



                                                               [facility]

 

     Hyalgan 20 Hyalgan 20 2019-0      No             20mg                     C

[facility]



     mg   mg   4-25                                              [facility]



               00:00:                                              - [facility]



                                                               [facility]

 

     LIDOCAINE LIDOCAINE 2019-0      No             10mg                     Com

mon



     HCL 10MG/ML HCL 10MG/ML 4-25                                              S

[facility]



               00:00:                                              - [facility]



                                                               [facility]

 

     LIDOCAINE LIDOCAINE 2019-0      No             10mg                     Com

mon



     HCL 10MG/ML HCL 10MG/ML 4-25                                              S

[facility]



               00:00:                                              - [facility]



                                                               [facility]

 

     Hyalgan 20 Hyalgan 20 2019-0      No             20mg                     C

[facility]



     mg   mg   4-25                                              [facility]



               00:00:                                              - [facility]



                                                               [facility]

 

     Hyalgan 20 Hyalgan 20 2019-0      No             20mg                     C

[facility]



     mg   mg   4-25                                              [facility]



               00:00:                                              - [facility]



                                                               [facility]

 

     LIDOCAINE LIDOCAINE 2019-0      No             10mg                     Com

mon



     HCL 10MG/ML HCL 10MG/ML 4-25                                              S

[facility]



               00:00:                                              - [facility]



                                                               [facility]

 

     LIDOCAINE LIDOCAINE 2019-0      No             10mg                     Com

mon



     HCL 10MG/ML HCL 10MG/ML 4-25                                              S

[facility]



               00:00:                                              - [facility]



                                                               [facility]

 

     Hyalgan 20 Hyalgan 20 2019-0      No             20mg                     C

[facility]



     mg   mg   4-25                                              [facility]



               00:00:                                              - [facility]



                                                               [facility]

 

     Hyalgan 20 Hyalgan 20 2019-0      No             20mg                     C

[facility]



     mg   mg   4-25                                              [facility]



               00:00:                                              - [facility]



                                                               [facility]

 

     LIDOCAINE LIDOCAINE 2019-0      No             10mg                     Com

mon



     HCL 10MG/ML HCL 10MG/ML 4-25                                              S

[facility]



               00:00:                                              - [facility]



                                                               [facility]

 

     LIDOCAINE LIDOCAINE 2019-0      No             10mg                     Com

mon



     HCL 10MG/ML HCL 10MG/ML 4-25                                              S

[facility]



               00:00:                                              - [facility]



                                                               [facility]

 

     Hyalgan 20 Hyalgan 20 2019-0      No             20mg                     C

[facility]



     mg   mg   4-25                                              [facility]



               00:00:                                              - [facility]



                                                               [facility]

 

     Hyalgan 20 Hyalgan 20 2019-0      No             20mg                     C

[facility]



     mg   mg   4-25                                              [facility]



               00:00:                                              - [facility]



                                                               [facility]

 

     LIDOCAINE LIDOCAINE 2019-0      No             10mg                     Com

mon



     HCL 10MG/ML HCL 10MG/ML 4-25                                              S

[facility]



               00:00:                                              - [facility]



                                                               [facility]

 

     LIDOCAINE LIDOCAINE 2019-0      No             10mg                     Com

mon



     HCL 10MG/ML HCL 10MG/ML 4-25                                              S

[facility]



               00:00:                                              - [facility]



                                                               [facility]

 

     Hyalgan 20 Hyalgan 20 2019-0      No             20mg                     C

[facility]



     mg   mg   4-25                                              [facility]



               00:00:                                              - [facility]



                                                               [facility]

 

     Hyalgan 20 Hyalgan 20 2019-0      No             20mg                     C

[facility]



     mg   mg   4-25                                              [facility]



               00:00:                                              - [facility]



                                                               [facility]

 

     LIDOCAINE LIDOCAINE 2019-0      No             10mg                     Com

mon



     HCL 10MG/ML HCL 10MG/ML 4-25                                              S

[facility]



               00:00:                                              - [facility]



                                                               [facility]

 

     LIDOCAINE LIDOCAINE 2019-0      No             10mg                     Com

mon



     HCL 10MG/ML HCL 10MG/ML 4-25                                              S

[facility]



               00:00:                                              - [facility]



                                                               [facility]

 

     Hyalgan 20 Hyalgan 20 2019-0      No             20mg                     C

ommon



     mg   mg   4-25                                              [facility]



               00:00:                                              - [facility]



                                                               [facility]

 

     Hyalgan 20 Hyalgan 20 2019-0      No             20mg                     C

om[facility]



     mg   mg   4-25                                              [facility]



               00:00:                                              - [facility]



                                                               [facility]

 

     LIDOCAINE LIDOCAINE 2019-0      No             10mg                     Com

mon



     HCL 10MG/ML HCL 10MG/ML 4-25                                              S

[facility]



               00:00:                                              - [facility]



                                                               [facility]

 

     LIDOCAINE LIDOCAINE 2019-0      No             10mg                     Com

mon



     HCL 10MG/ML HCL 10MG/ML 4-25                                              S

[facility]



               00:00:                                              - [facility]



                                                               [facility]

 

     Hyalgan 20 Hyalgan 20 2019-0      No             20mg                     C

om[facility]



     mg   mg   4-25                                              [facility]



               00:00:                                              - [facility]



                                                               [facility]

 

     Hyalgan 20 Hyalgan 20 2019-0      No             20mg                     C

om[facility]



     mg   mg   4-25                                              [facility]



               00:00:                                              - [facility]



                                                               [facility]

 

     LIDOCAINE LIDOCAINE 2019-0      No             10mg                     Com

mon



     HCL 10MG/ML HCL 10MG/ML 4-25                                              S

[facility]



               00:00:                                              - [facility]



                                                               [facility]

 

     LIDOCAINE LIDOCAINE 2019-0      No             10mg                     Com

mon



     HCL 10MG/ML HCL 10MG/ML 4-25                                              S

[facility]



               00:00:                                              - [facility]



                                                               [facility]

 

     Hyalgan 20 Hyalgan 20 2019-0      No             20mg                     C

[facility]



     mg   mg   4-25                                              [facility]



               00:00:                                              - [facility]



                                                               [facility]

 

     Hyalgan 20 Hyalgan 20 2019-0      No             20mg                     C

[facility]



     mg   mg   4-25                                              [facility]



               00:00:                                              - [facility]



                                                               [facility]

 

     LIDOCAINE LIDOCAINE 2019-0      No             10mg                     Com

mon



     HCL 10MG/ML HCL 10MG/ML 4-25                                              S

[facility]



               00:00:                                              - [facility]



                                                               [facility]

 

     LIDOCAINE LIDOCAINE 2019-0      No             10mg                     Com

mon



     HCL 10MG/ML HCL 10MG/ML 4-25                                              S

[facility]



               00:00:                                              - [facility]



                                                               [facility]

 

     Hyalgan 20 Hyalgan 20 2019-0      No             20mg                     C

om[facility]



     mg   mg   4-25                                              [facility]



               00:00:                                              - [facility]



                                                               [facility]

 

     Hyalgan 20 Hyalgan 20 2019-0      No             20mg                     C

om[facility]



     mg   mg   4-25                                              [facility]



               00:00:                                              - [facility]



                                                               [facility]

 

     LIDOCAINE LIDOCAINE 2019-0      No             10mg                     Com

mon



     HCL 10MG/ML HCL 10MG/ML 4-25                                              S

[facility]



               00:00:                                              - [facility]



                                                               [facility]

 

     LIDOCAINE LIDOCAINE 2019-0      No             10mg                     Com

mon



     HCL 10MG/ML HCL 10MG/ML 4-25                                              S

[facility]



               00:00:                                              - [facility]



                                                               [facility]

 

     Hyalgan 20 Hyalgan 20 2019-0      No             20mg                     C

[facility]



     mg   mg   4-25                                              [facility]



               00:00:                                              - [facility]



                                                               [facility]

 

     Hyalgan 20 Hyalgan 20 2019-0      No             20mg                     C

om[facility]



     mg   mg   4-25                                              [facility]



               00:00:                                              - [facility]



                                                               [facility]

 

     LIDOCAINE LIDOCAINE 2019-0      No             10mg                     Com

mon



     HCL 10MG/ML HCL 10MG/ML 4-25                                              S

[facility]



               00:00:                                              - [facility]



                                                               [facility]

 

     LIDOCAINE LIDOCAINE 2019-0      No             10mg                     Com

mon



     HCL 10MG/ML HCL 10MG/ML 4-25                                              S

[facility]



               00:00:                                              - [facility]



                                                               [facility]

 

     Hyalgan 20 Hyalgan 20 2019-0      No             20mg                     C

om[facility]



     mg   mg   4-25                                              [facility]



               00:00:                                              - [facility]



                                                               [facility]

 

     Hyalgan 20 Hyalgan 20 2019-0      No             20mg                     C

om[facility]



     mg   mg   4-25                                              [facility]



               00:00:                                              - [facility]



                                                               [facility]

 

     LIDOCAINE LIDOCAINE 2019-0      No             10mg                     Com

mon



     HCL 10MG/ML HCL 10MG/ML 4-25                                              S

[facility]



               00:00:                                              - [facility]



                                                               [facility]

 

     LIDOCAINE LIDOCAINE 2019-0      No             10mg                     Com

mon



     HCL 10MG/ML HCL 10MG/ML 4-25                                              S

[facility]



               00:00:                                              - [facility]



                                                               [facility]

 

     Hyalgan 20 Hyalgan 20 2019-0      No             20mg                     C

[facility]



     mg   mg   4-25                                              [facility]



               00:00:                                              - [facility]



                                                               [facility]

 

     Hyalgan 20 Hyalgan 20 2019-0      No             20mg                     C

[facility]



     mg   mg   4-25                                              [facility]



               00:00:                                              - [facility]



                                                               [facility]

 

     LIDOCAINE LIDOCAINE 2019-0      No             10mg                     Com

mon



     HCL 10MG/ML HCL 10MG/ML 4-25                                              S

[facility]



               00:00:                                              - [facility]



                                                               [facility]

 

     LIDOCAINE LIDOCAINE 2019-0      No             10mg                     Com

mon



     HCL 10MG/ML HCL 10MG/ML 4-25                                              S

[facility]



               00:00:                                              - [facility]



                                                               [facility]

 

     Hyalgan 20 Hyalgan 20 2019-0      No             20mg                     C

[facility]



     mg   mg   4-25                                              [facility]



               00:00:                                              - [facility]



                                                               [facility]

 

     Hyalgan 20 Hyalgan 20 2019-0      No             20mg                     C

[facility]



     mg   mg   4-25                                              [facility]



               00:00:                                              - [facility]



                                                               [facility]

 

     LIDOCAINE LIDOCAINE 2019-0      No             10mg                     Com

mon



     HCL 10MG/ML HCL 10MG/ML 4-25                                              S

[facility]



               00:00:                                              - [facility]



                                                               [facility]

 

     LIDOCAINE LIDOCAINE 2019-0      No             10mg                     Com

mon



     HCL 10MG/ML HCL 10MG/ML 4-25                                              S

[facility]



               00:00:                                              - [facility]



                                                               [facility]

 

     Hyalgan 20 Hyalgan 20 2019-0      No             20mg                     C

[facility]



     mg   mg   4-25                                              [facility]



               00:00:                                              - [facility]



                                                               [facility]

 

     Hyalgan 20 Hyalgan 20 2019-0      No             20mg                     C

[facility]



     mg   mg   4-25                                              [facility]



               00:00:                                              - [facility]



                                                               [facility]

 

     LIDOCAINE LIDOCAINE 2019-0      No             10mg                     Com

mon



     HCL 10MG/ML HCL 10MG/ML 4-25                                              S

[facility]



               00:00:                                              - [facility]



                                                               [facility]

 

     LIDOCAINE LIDOCAINE 2019-0      No             10mg                     Com

mon



     HCL 10MG/ML HCL 10MG/ML 4-25                                              S

[facility]



               00:00:                                              - [facility]



                                                               [facility]

 

     Hyalgan 20 Hyalgan 20 2019-0      No             20mg                     C

[facility]



     mg   mg   4-25                                              [facility]



               00:00:                                              - [facility]



                                                               [facility]

 

     Hyalgan 20 Hyalgan 20 2019-0      No             20mg                     C

[facility]



     mg   mg   4-25                                              [facility]



               00:00:                                              - [facility]



                                                               [facility]

 

     LIDOCAINE LIDOCAINE 2019-0      No             10mg                     Com

mon



     HCL 10MG/ML HCL 10MG/ML 4-25                                              S

[facility]



               00:00:                                              - [facility]



                                                               [facility]

 

     LIDOCAINE LIDOCAINE 2019-0      No             10mg                     Com

mon



     HCL 10MG/ML HCL 10MG/ML 4-25                                              S

[facility]



               00:00:                                              - [facility]



                                                               [facility]

 

     Hyalgan 20 Hyalgan 20 2019-0      No             20mg                     C

[facility]



     mg   mg   4-25                                              [facility]



               00:00:                                              - [facility]



                                                               [facility]

 

     Hyalgan 20 Hyalgan 20 2019-0      No             20mg                     C

[facility]



     mg   mg   4-25                                              [facility]



               00:00:                                              - [facility]



                                                               [facility]

 

     LIDOCAINE LIDOCAINE 2019-0      No             10mg                     Com

mon



     HCL 10MG/ML HCL 10MG/ML 4-25                                              S

[facility]



               00:00:                                              - [facility]



                                                               [facility]

 

     LIDOCAINE LIDOCAINE 2019-0      No             10mg                     Com

mon



     HCL 10MG/ML HCL 10MG/ML 4-25                                              S

[facility]



               00:00:                                              - [facility]



                                                               [facility]

 

     Hyalgan 20 Hyalgan 20 2019-0      No             20mg                     C

[facility]



     mg   mg   4-25                                              [facility]



               00:00:                                              - [facility]



                                                               [facility]

 

     Hyalgan 20 Hyalgan 20 2019-0      No             20mg                     C

[facility]



     mg   mg   4-25                                              [facility]



               00:00:                                              - [facility]



                                                               [facility]

 

     LIDOCAINE LIDOCAINE 2019-0      No             10mg                     Com

mon



     HCL 10MG/ML HCL 10MG/ML 4-25                                              S

[facility]



               00:00:                                              - [facility]



                                                               [facility]

 

     LIDOCAINE LIDOCAINE 2019-0      No             10mg                     Com

mon



     HCL 10MG/ML HCL 10MG/ML 4-25                                              S

[facility]



               00:00:                                              - [facility]



                                                               [facility]

 

     Hyalgan 20 Hyalgan 20 2019-0      No             20mg                     C

[facility]



     mg   mg   4-25                                              [facility]



               00:00:                                              - [facility]



                                                               [facility]

 

     Hyalgan 20 Hyalgan 20 2019-0      No             20mg                     C

[facility]



     mg   mg   4-25                                              [facility]



               00:00:                                              - [facility]



                                                               [facility]

 

     LIDOCAINE LIDOCAINE 2019-0      No             10mg                     Com

mon



     HCL 10MG/ML HCL 10MG/ML 4-25                                              S

[facility]



               00:00:                                              - [facility]



                                                               [facility]

 

     LIDOCAINE LIDOCAINE 2019-0      No             10mg                     Com

mon



     HCL 10MG/ML HCL 10MG/ML 4-25                                              S

[facility]



               00:00:                                              - [facility]



                                                               [facility]

 

     Hyalgan 20 Hyalgan 20 2019-0      No             20mg                     C

[facility]



     mg   mg   4-25                                              [facility]



               00:00:                                              - [facility]



                                                               [facility]

 

     Hyalgan 20 Hyalgan 20 2019-0      No             20mg                     C

[facility]



     mg   mg   4-25                                              [facility]



               00:00:                                              - [facility]



                                                               [facility]

 

     LIDOCAINE LIDOCAINE 2019-0      No             10mg                     Com

mon



     HCL 10MG/ML HCL 10MG/ML 4-25                                              S

[facility]



               00:00:                                              - [facility]



                                                               [facility]

 

     LIDOCAINE LIDOCAINE 2019-0      No             10mg                     Com

mon



     HCL 10MG/ML HCL 10MG/ML 4-25                                              S

[facility]



               00:00:                                              - [facility]



                                                               [facility]

 

     Hyalgan 20 Hyalgan 20 2019-0      No             20mg                     C

[facility]



     mg   mg   4-25                                              [facility]



               00:00:                                              - [facility]



                                                               [facility]

 

     Hyalgan 20 Hyalgan 20 2019-0      No             20mg                     C

[facility]



     mg   mg   4-25                                              [facility]



               00:00:                                              - [facility]



                                                               [facility]

 

     LIDOCAINE LIDOCAINE 2019-0      No             10mg                     Com

mon



     HCL 10MG/ML HCL 10MG/ML 4-25                                              S

[facility]



               00:00:                                              - [facility]



                                                               [facility]

 

     LIDOCAINE LIDOCAINE 2019-0      No             10mg                     Com

mon



     HCL 10MG/ML HCL 10MG/ML 4-25                                              S

[facility]



               00:00:                                              - [facility]



                                                               [facility]

 

     Hyalgan 20 Hyalgan 20 2019-0      No             20mg                     C

[facility]



     mg   mg   4-25                                              [facility]



               00:00:                                              - [facility]



                                                               [facility]

 

     Hyalgan 20 Hyalgan 20 2019-0      No             20mg                     C

[facility]



     mg   mg   4-25                                              [facility]



               00:00:                                              - [facility]



                                                               [facility]

 

     LIDOCAINE LIDOCAINE 2019-0      No             10mg                     Com

mon



     HCL 10MG/ML HCL 10MG/ML 4-25                                              S

[facility]



               00:00:                                              - [facility]



                                                               [facility]

 

     LIDOCAINE LIDOCAINE 2019-0      No             10mg                     Com

mon



     HCL 10MG/ML HCL 10MG/ML 4-25                                              S

[facility]



               00:00:                                              - [facility]



                                                               [facility]

 

     Hyalgan 20 Hyalgan 20 2019-0      No             20mg                     C

[facility]



     mg   mg   4-25                                              [facility]



               00:00:                                              - [facility]



                                                               [facility]

 

     Hyalgan 20 Hyalgan 20 2019-0      No             20mg                     C

[facility]



     mg   mg   4-25                                              [facility]



               00:00:                                              - [facility]



                                                               [facility]

 

     LIDOCAINE LIDOCAINE 2019-0      No             10mg                     Com

mon



     HCL 10MG/ML HCL 10MG/ML 4-25                                              S

[facility]



               00:00:                                              - [facility]



                                                               [facility]

 

     LIDOCAINE LIDOCAINE 2019-0      No             10mg                     Com

mon



     HCL 10MG/ML HCL 10MG/ML 4-25                                              S

[facility]



               00:00:                                              - [facility]



                                                               [facility]

 

     Hyalgan 20 Hyalgan 20 2019-0      No             20mg                     C

[facility]



     mg   mg   4-25                                              [facility]



               00:00:                                              - [facility]



                                                               [facility]

 

     Hyalgan 20 Hyalgan 20 2019-0      No             20mg                     C

ommon



     mg   mg   4-25                                              [facility]



               00:00:                                              - [facility]



                                                               [facility]

 

     LIDOCAINE LIDOCAINE 2019-0      No             10mg                     Com

mon



     HCL 10MG/ML HCL 10MG/ML 4-25                                              S

[facility]



               00:00:                                              - [facility]



                                                               [facility]

 

     LIDOCAINE LIDOCAINE 2019-0      No             10mg                     Com

mon



     HCL 10MG/ML HCL 10MG/ML 4-25                                              S

[facility]



               00:00:                                              - [facility]



                                                               [facility]

 

     Hyalgan 20 Hyalgan 20 2019-0      No             20mg                     C

ommon



     mg   mg   4-25                                              [facility]



               00:00:                                              - [facility]



                                                               [facility]

 

     Hyalgan 20 Hyalgan 20 2019-0      No             20mg                     C

ommon



     mg   mg   4-25                                              [facility]



               00:00:                                              - [facility]



                                                               [facility]

 

     LIDOCAINE LIDOCAINE 2019-0      No             10mg                     Com

mon



     HCL 10MG/ML HCL 10MG/ML 4-25                                              S

[facility]



               00:00:                                              - [facility]



                                                               [facility]

 

     LIDOCAINE LIDOCAINE 2019-0      No             10mg                     Com

mon



     HCL 10MG/ML HCL 10MG/ML 4-25                                              S

[facility]



               00:00:                                              - [facility]



                                                               [facility]

 

     Hyalgan 20 Hyalgan 20 2019-0      No             20mg                     C

ommon



     mg   mg   4-25                                              [facility]



               00:00:                                              - [facility]



                                                               [facility]

 

     Hyalgan 20 Hyalgan 20 2019-0      No             20mg                     C

ommon



     mg   mg   4-25                                              [facility]



               00:00:                                              - [facility]



                                                               [facility]

 

     LIDOCAINE LIDOCAINE 2019-0      No             10mg                     Com

mon



     HCL 10MG/ML HCL 10MG/ML 4-25                                              S

[facility]



               00:00:                                              - [facility]



                                                               [facility]

 

     LIDOCAINE LIDOCAINE 2019-0      No             10mg                     Com

mon



     HCL 10MG/ML HCL 10MG/ML 4-25                                              S

[facility]



               00:00:                                              - [facility]



                                                               [facility]

 

     Hyalgan 20 Hyalgan 20 2019-0      No             20mg                     C

ommon



     mg   mg   4-25                                              [facility]



               00:00:                                              - [facility]



                                                               [facility]

 

     Hyalgan 20 Hyalgan 20 2019-0      No             20mg                     C

ommon



     mg   mg   4-25                                              [facility]



               00:00:                                              - [facility]



                                                               [facility]

 

     LIDOCAINE LIDOCAINE 2019-0      No             10mg                     Com

mon



     HCL 10MG/ML HCL 10MG/ML 4-25                                              S

[facility]



               00:00:                                              - [facility]



                                                               [facility]

 

     LIDOCAINE LIDOCAINE 2019-0      No             10mg                     Com

mon



     HCL 10MG/ML HCL 10MG/ML 4-25                                              S

[facility]



               00:00:                                              - [facility]



                                                               [facility]

 

     Hyalgan 20 Hyalgan 20 2019-0      No             20mg                     C

ommon



     mg   mg   4-25                                              [facility]



               00:00:                                              - [facility]



                                                               [facility]

 

     Hyalgan 20 Hyalgan 20 2019-0      No             20mg                     C

ommon



     mg   mg   4-25                                              [facility]



               00:00:                                              - [facility]



                                                               [facility]

 

     LIDOCAINE LIDOCAINE 2019-0      No             10mg                     Com

mon



     HCL 10MG/ML HCL 10MG/ML 4-25                                              S

[facility]



               00:00:                                              - [facility]



                                                               [facility]

 

     LIDOCAINE LIDOCAINE 2019-0      No             10mg                     Com

mon



     HCL 10MG/ML HCL 10MG/ML 4-25                                              S

[facility]



               00:00:                                              - [facility]



                                                               [facility]

 

     Hyalgan 20 Hyalgan 20 2019-0      No             20mg                     C

ommon



     mg   mg   4-25                                              [facility]



               00:00:                                              - [facility]



                                                               [facility]

 

     Hyalgan 20 Hyalgan 20 2019-0      No             20mg                     C

ommon



     mg   mg   4-25                                              [facility]



               00:00:                                              - [facility]



                                                               [facility]

 

     LIDOCAINE LIDOCAINE 2019-0      No             10mg                     Com

mon



     HCL 10MG/ML HCL 10MG/ML 4-25                                              S

[facility]



               00:00:                                              - [facility]



                                                               [facility]

 

     LIDOCAINE LIDOCAINE 2019-0      No             10mg                     Com

mon



     HCL 10MG/ML HCL 10MG/ML 4-25                                              S

[facility]



               00:00:                                              - [facility]



                                                               [facility]

 

     Hyalgan 20 Hyalgan 20 2019-0      No             20mg                     C

ommon



     mg   mg   4-25                                              [facility]



               00:00:                                              - [facility]



                                                               [facility]

 

     Hyalgan 20 Hyalgan 20 2019-0      No             20mg                     C

ommon



     mg   mg   4-25                                              [facility]



               00:00:                                              - [facility]



                                                               [facility]

 

     LIDOCAINE LIDOCAINE 2019-0      No             10mg                     Com

mon



     HCL 10MG/ML HCL 10MG/ML 4-25                                              S

[facility]



               00:00:                                              - [facility]



                                                               [facility]

 

     LIDOCAINE LIDOCAINE 2019-0      No             10mg                     Com

mon



     HCL 10MG/ML HCL 10MG/ML 4-25                                              S

[facility]



               00:00:                                              - [facility]



                                                               [facility]

 

     Hyalgan 20 Hyalgan 20 2019-0      No             20mg                     C

ommon



     mg   mg   4-                                              [facility]



               00:00:                                              - [facility]



                                                               [facility]

 

     Hyalgan 20 Hyalgan 20 2019-0      No             20mg                     C

ommon



     mg   mg   4-17                                              [facility]



               00:00:                                              - [facility]



                                                               [facility]

 

     LIDOCAINE LIDOCAINE 2019-0      No             10ug                     Com

mon



     HCL 10MG/ML HCL 10MG/ML 4-17                                              S

[facility]



               00:00:                                              - [facility]



                                                               [facility]

 

     LIDOCAINE LIDOCAINE 2019-0      No             10mg                     Com

mon



     HCL 10MG/ML HCL 10MG/ML 4-17                                              S

[facility]



               00:00:                                              - [facility]



                                                               [facility]

 

     Hyalgan 20 Hyalgan 20 2019-0      No             20mg                     C

ommon



     mg   mg   4-17                                              [facility]



               00:00:                                              - [facility]



                                                               [facility]

 

     Hyalgan 20 Hyalgan 20 2019-0      No             20mg                     C

ommon



     mg   mg   4-17                                              [facility]



               00:00:                                              - [facility]



                                                               [facility]

 

     LIDOCAINE LIDOCAINE 2019-0      No             10ug                     Com

mon



     HCL 10MG/ML HCL 10MG/ML 4-17                                              S

[facility]



               00:00:                                              - [facility]



                                                               [facility]

 

     LIDOCAINE LIDOCAINE 2019-0      No             10mg                     Com

mon



     HCL 10MG/ML HCL 10MG/ML 4-17                                              S

[facility]



               00:00:                                              - [facility]



                                                               [facility]

 

     Hyalgan 20 Hyalgan 20 2019-0      No             20mg                     C

ommon



     mg   mg   4-17                                              [facility]



               00:00:                                              - [facility]



                                                               [facility]

 

     Hyalgan 20 Hyalgan 20 2019-0      No             20mg                     C

ommon



     mg   mg   4-17                                              [facility]



               00:00:                                              - [facility]



                                                               [facility]

 

     LIDOCAINE LIDOCAINE 2019-0      No             10ug                     Com

mon



     HCL 10MG/ML HCL 10MG/ML 4-17                                              S

[facility]



               00:00:                                              - [facility]



                                                               [facility]

 

     LIDOCAINE LIDOCAINE 2019-0      No             10mg                     Com

mon



     HCL 10MG/ML HCL 10MG/ML 4-17                                              S

[facility]



               00:00:                                              - [facility]



                                                               [facility]

 

     Hyalgan 20 Hyalgan 20 2019-0      No             20mg                     C

ommon



     mg   mg   4-17                                              [facility]



               00:00:                                              - [facility]



                                                               [facility]

 

     Hyalgan 20 Hyalgan 20 2019-0      No             20mg                     C

om[facility]



     mg   mg   4-17                                              [facility]



               00:00:                                              - [facility]



                                                               [facility]

 

     LIDOCAINE LIDOCAINE 2019-0      No             10ug                     Com

mon



     HCL 10MG/ML HCL 10MG/ML 4-17                                              S

[facility]



               00:00:                                              - [facility]



                                                               [facility]

 

     LIDOCAINE LIDOCAINE 2019-0      No             10mg                     Com

mon



     HCL 10MG/ML HCL 10MG/ML 4-17                                              S

[facility]



               00:00:                                              - [facility]



                                                               [facility]

 

     Hyalgan 20 Hyalgan 20 2019-0      No             20mg                     C

ommon



     mg   mg   4-17                                              [facility]



               00:00:                                              - [facility]



                                                               [facility]

 

     Hyalgan 20 Hyalgan 20 2019-0      No             20mg                     C

om[facility]



     mg   mg   4-17                                              [facility]



               00:00:                                              - [facility]



                                                               [facility]

 

     LIDOCAINE LIDOCAINE 2019-0      No             10ug                     Com

mon



     HCL 10MG/ML HCL 10MG/ML 4-17                                              S

[facility]



               00:00:                                              - [facility]



                                                               [facility]

 

     LIDOCAINE LIDOCAINE 2019-0      No             10mg                     Com

mon



     HCL 10MG/ML HCL 10MG/ML 4-17                                              S

[facility]



               00:00:                                              - [facility]



                                                               [facility]

 

     Hyalgan 20 Hyalgan 20 2019-0      No             20mg                     C

ommon



     mg   mg   4-17                                              [facility]



               00:00:                                              - [facility]



                                                               [facility]

 

     Hyalgan 20 Hyalgan 20 2019-0      No             20mg                     C

om[facility]



     mg   mg   4-17                                              [facility]



               00:00:                                              - [facility]



                                                               [facility]

 

     LIDOCAINE LIDOCAINE 2019-0      No             10ug                     Com

mon



     HCL 10MG/ML HCL 10MG/ML 4-17                                              S

[facility]



               00:00:                                              - [facility]



                                                               [facility]

 

     LIDOCAINE LIDOCAINE 2019-0      No             10mg                     Com

mon



     HCL 10MG/ML HCL 10MG/ML 4-17                                              S

[facility]



               00:00:                                              - [facility]



                                                               [facility]

 

     Hyalgan 20 Hyalgan 20 2019-0      No             20mg                     C

om[facility]



     mg   mg   4-                                              [facility]



               00:00:                                              - [facility]



                                                               [facility]

 

     Hyalgan 20 Hyalgan 20 2019-0      No             20mg                     C

om[facility]



     mg   mg   4-17                                              [facility]



               00:00:                                              - [facility]



                                                               [facility]

 

     LIDOCAINE LIDOCAINE 2019-0      No             10ug                     Com

mon



     HCL 10MG/ML HCL 10MG/ML 4-17                                              S

[facility]



               00:00:                                              - [facility]



                                                               [facility]

 

     LIDOCAINE LIDOCAINE 2019-0      No             10mg                     Com

mon



     HCL 10MG/ML HCL 10MG/ML 4-17                                              S

[facility]



               00:00:                                              - [facility]



                                                               [facility]

 

     Hyalgan 20 Hyalgan 20 2019-0      No             20mg                     C

om[facility]



     mg   mg   4-                                              [facility]



               00:00:                                              - [facility]



                                                               [facility]

 

     Hyalgan 20 Hyalgan 20 2019-0      No             20mg                     C

om[facility]



     mg   mg   4-17                                              [facility]



               00:00:                                              - [facility]



                                                               [facility]

 

     LIDOCAINE LIDOCAINE 2019-0      No             10ug                     Com

mon



     HCL 10MG/ML HCL 10MG/ML 4-17                                              S

[facility]



               00:00:                                              - [facility]



                                                               [facility]

 

     LIDOCAINE LIDOCAINE 2019-0      No             10mg                     Com

mon



     HCL 10MG/ML HCL 10MG/ML 4-17                                              S

[facility]



               00:00:                                              - [facility]



                                                               [facility]

 

     Hyalgan 20 Hyalgan 20 2019-0      No             20mg                     C

[facility]



     mg   mg   4-17                                              [facility]



               00:00:                                              - [facility]



                                                               [facility]

 

     Hyalgan 20 Hyalgan 20 2019-0      No             20mg                     C

om[facility]



     mg   mg   4-17                                              [facility]



               00:00:                                              - [facility]



                                                               [facility]

 

     LIDOCAINE LIDOCAINE 2019-0      No             10ug                     Com

mon



     HCL 10MG/ML HCL 10MG/ML 4-17                                              S

[facility]



               00:00:                                              - [facility]



                                                               [facility]

 

     LIDOCAINE LIDOCAINE 2019-0      No             10mg                     Com

mon



     HCL 10MG/ML HCL 10MG/ML 4-17                                              S

[facility]



               00:00:                                              - [facility]



                                                               [facility]

 

     Hyalgan 20 Hyalgan 20 2019-0      No             20mg                     C

om[facility]



     mg   mg   4-17                                              [facility]



               00:00:                                              - [facility]



                                                               [facility]

 

     Hyalgan 20 Hyalgan 20 2019-0      No             20mg                     C

om[facility]



     mg   mg   4-17                                              [facility]



               00:00:                                              - [facility]



                                                               [facility]

 

     LIDOCAINE LIDOCAINE 2019-0      No             10ug                     Com

mon



     HCL 10MG/ML HCL 10MG/ML 4-17                                              S

[facility]



               00:00:                                              - [facility]



                                                               [facility]

 

     LIDOCAINE LIDOCAINE 2019-0      No             10mg                     Com

mon



     HCL 10MG/ML HCL 10MG/ML 4-17                                              S

[facility]



               00:00:                                              - [facility]



                                                               [facility]

 

     Hyalgan 20 Hyalgan 20 2019-0      No             20mg                     C

om[facility]



     mg   mg   4-17                                              [facility]



               00:00:                                              - [facility]



                                                               [facility]

 

     Hyalgan 20 Hyalgan 20 2019-0      No             20mg                     C

om[facility]



     mg   mg   4-17                                              [facility]



               00:00:                                              - [facility]



                                                               [facility]

 

     LIDOCAINE LIDOCAINE 2019-0      No             10ug                     Com

mon



     HCL 10MG/ML HCL 10MG/ML 4-17                                              S

[facility]



               00:00:                                              - [facility]



                                                               [facility]

 

     LIDOCAINE LIDOCAINE 2019-0      No             10mg                     Com

mon



     HCL 10MG/ML HCL 10MG/ML 4-17                                              S

[facility]



               00:00:                                              - [facility]



                                                               [facility]

 

     Hyalgan 20 Hyalgan 20 2019-0      No             20mg                     C

om[facility]



     mg   mg   4-17                                              [facility]



               00:00:                                              - [facility]



                                                               [facility]

 

     Hyalgan 20 Hyalgan 20 2019-0      No             20mg                     C

ommon



     mg   mg   4-17                                              [facility]



               00:00:                                              - [facility]



                                                               [facility]

 

     LIDOCAINE LIDOCAINE 2019-0      No             10ug                     Com

mon



     HCL 10MG/ML HCL 10MG/ML 4-17                                              S

[facility]



               00:00:                                              - [facility]



                                                               [facility]

 

     LIDOCAINE LIDOCAINE 2019-0      No             10mg                     Com

mon



     HCL 10MG/ML HCL 10MG/ML 4-17                                              S

[facility]



               00:00:                                              - [facility]



                                                               [facility]

 

     Hyalgan 20 Hyalgan 20 2019-0      No             20mg                     C

ommon



     mg   mg   4-17                                              [facility]



               00:00:                                              - [facility]



                                                               [facility]

 

     Hyalgan 20 Hyalgan 20 2019-0      No             20mg                     C

ommon



     mg   mg   4-17                                              [facility]



               00:00:                                              - [facility]



                                                               [facility]

 

     LIDOCAINE LIDOCAINE 2019-0      No             10ug                     Com

mon



     HCL 10MG/ML HCL 10MG/ML 4-17                                              S

[facility]



               00:00:                                              - [facility]



                                                               [facility]

 

     LIDOCAINE LIDOCAINE 2019-0      No             10mg                     Com

mon



     HCL 10MG/ML HCL 10MG/ML 4-17                                              S

[facility]



               00:00:                                              - [facility]



                                                               [facility]

 

     Hyalgan 20 Hyalgan 20 2019-0      No             20mg                     C

ommon



     mg   mg   4-                                              [facility]



               00:00:                                              - [facility]



                                                               [facility]

 

     Hyalgan 20 Hyalgan 20 2019-0      No             20mg                     C

ommon



     mg   mg   4-                                              [facility]



               00:00:                                              - [facility]



                                                               [facility]

 

     LIDOCAINE LIDOCAINE 2019-0      No             10ug                     Com

mon



     HCL 10MG/ML HCL 10MG/ML 4-17                                              S

[facility]



               00:00:                                              - [facility]



                                                               [facility]

 

     LIDOCAINE LIDOCAINE 2019-0      No             10mg                     Com

mon



     HCL 10MG/ML HCL 10MG/ML 4-17                                              S

[facility]



               00:00:                                              - [facility]



                                                               [facility]

 

     Hyalgan 20 Hyalgan 20 2019-0      No             20mg                     C

ommon



     mg   mg   4-                                              [facility]



               00:00:                                              - [facility]



                                                               [facility]

 

     Hyalgan 20 Hyalgan 20 2019-0      No             20mg                     C

ommon



     mg   mg   4-17                                              [facility]



               00:00:                                              - [facility]



                                                               [facility]

 

     LIDOCAINE LIDOCAINE 2019-0      No             10ug                     Com

mon



     HCL 10MG/ML HCL 10MG/ML 4-17                                              S

[facility]



               00:00:                                              - [facility]



                                                               [facility]

 

     LIDOCAINE LIDOCAINE 2019-0      No             10mg                     Com

mon



     HCL 10MG/ML HCL 10MG/ML 4-17                                              S

[facility]



               00:00:                                              - [facility]



                                                               [facility]

 

     Hyalgan 20 Hyalgan 20 2019-0      No             20mg                     C

ommon



     mg   mg   4-17                                              [facility]



               00:00:                                              - [facility]



                                                               [facility]

 

     Hyalgan 20 Hyalgan 20 2019-0      No             20mg                     C

ommon



     mg   mg   4-17                                              [facility]



               00:00:                                              - [facility]



                                                               [facility]

 

     LIDOCAINE LIDOCAINE 2019-0      No             10ug                     Com

mon



     HCL 10MG/ML HCL 10MG/ML 4-17                                              S

[facility]



               00:00:                                              - [facility]



                                                               [facility]

 

     LIDOCAINE LIDOCAINE 2019-0      No             10mg                     Com

mon



     HCL 10MG/ML HCL 10MG/ML 4-17                                              S

[facility]



               00:00:                                              - [facility]



                                                               [facility]

 

     Hyalgan 20 Hyalgan 20 2019-0      No             20mg                     C

ommon



     mg   mg   4-17                                              [facility]



               00:00:                                              - [facility]



                                                               [facility]

 

     Hyalgan 20 Hyalgan 20 2019-0      No             20mg                     C

ommon



     mg   mg   4-17                                              [facility]



               00:00:                                              - [facility]



                                                               [facility]

 

     LIDOCAINE LIDOCAINE 2019-0      No             10ug                     Com

mon



     HCL 10MG/ML HCL 10MG/ML 4-17                                              S

[facility]



               00:00:                                              - [facility]



                                                               [facility]

 

     LIDOCAINE LIDOCAINE 2019-0      No             10mg                     Com

mon



     HCL 10MG/ML HCL 10MG/ML 4-17                                              S

[facility]



               00:00:                                              - [facility]



                                                               [facility]

 

     Hyalgan 20 Hyalgan 20 2019-0      No             20mg                     C

ommon



     mg   mg   4-17                                              [facility]



               00:00:                                              - [facility]



                                                               [facility]

 

     Hyalgan 20 Hyalgan 20 2019-0      No             20mg                     C

ommon



     mg   mg   4-17                                              [facility]



               00:00:                                              - [facility]



                                                               [facility]

 

     LIDOCAINE LIDOCAINE 2019-0      No             10ug                     Com

mon



     HCL 10MG/ML HCL 10MG/ML 4-17                                              S

[facility]



               00:00:                                              - [facility]



                                                               [facility]

 

     LIDOCAINE LIDOCAINE 2019-0      No             10mg                     Com

mon



     HCL 10MG/ML HCL 10MG/ML 4-17                                              S

[facility]



               00:00:                                              - [facility]



                                                               [facility]

 

     Hyalgan 20 Hyalgan 20 2019-0      No             20mg                     C

ommon



     mg   mg   4-17                                              [facility]



               00:00:                                              - [facility]



                                                               [facility]

 

     Hyalgan 20 Hyalgan 20 2019-0      No             20mg                     C

ommon



     mg   mg   4-17                                              [facility]



               00:00:                                              - [facility]



                                                               [facility]

 

     LIDOCAINE LIDOCAINE 2019-0      No             10ug                     Com

mon



     HCL 10MG/ML HCL 10MG/ML 4-17                                              S

[facility]



               00:00:                                              - [facility]



                                                               [facility]

 

     LIDOCAINE LIDOCAINE 2019-0      No             10mg                     Com

mon



     HCL 10MG/ML HCL 10MG/ML 4-17                                              S

[facility]



               00:00:                                              - [facility]



                                                               [facility]

 

     Hyalgan 20 Hyalgan 20 2019-0      No             20mg                     C

ommon



     mg   mg   4-17                                              [facility]



               00:00:                                              - [facility]



                                                               [facility]

 

     Hyalgan 20 Hyalgan 20 2019-0      No             20mg                     C

ommon



     mg   mg   4-17                                              [facility]



               00:00:                                              - [facility]



                                                               [facility]

 

     LIDOCAINE LIDOCAINE 2019-0      No             10ug                     Com

mon



     HCL 10MG/ML HCL 10MG/ML 4-17                                              S

[facility]



               00:00:                                              - [facility]



                                                               [facility]

 

     LIDOCAINE LIDOCAINE 2019-0      No             10mg                     Com

mon



     HCL 10MG/ML HCL 10MG/ML 4-17                                              S

[facility]



               00:00:                                              - [facility]



                                                               [facility]

 

     Hyalgan 20 Hyalgan 20 2019-0      No             20mg                     C

ommon



     mg   mg   4-17                                              [facility]



               00:00:                                              - [facility]



                                                               [facility]

 

     Hyalgan 20 Hyalgan 20 2019-0      No             20mg                     C

ommon



     mg   mg   4-17                                              [facility]



               00:00:                                              - [facility]



                                                               [facility]

 

     LIDOCAINE LIDOCAINE 2019-0      No             10ug                     Com

mon



     HCL 10MG/ML HCL 10MG/ML 4-17                                              S

[facility]



               00:00:                                              - [facility]



                                                               [facility]

 

     LIDOCAINE LIDOCAINE 2019-0      No             10mg                     Com

mon



     HCL 10MG/ML HCL 10MG/ML 4-17                                              S

[facility]



               00:00:                                              - [facility]



                                                               [facility]

 

     Hyalgan 20 Hyalgan 20 2019-0      No             20mg                     C

ommon



     mg   mg   4-17                                              [facility]



               00:00:                                              - [facility]



                                                               [facility]

 

     Hyalgan 20 Hyalgan 20 2019-0      No             20mg                     C

ommon



     mg   mg   4-17                                              [facility]



               00:00:                                              - [facility]



                                                               [facility]

 

     LIDOCAINE LIDOCAINE 2019-0      No             10ug                     Com

mon



     HCL 10MG/ML HCL 10MG/ML 4-17                                              S

[facility]



               00:00:                                              - [facility]



                                                               [facility]

 

     LIDOCAINE LIDOCAINE 2019-0      No             10mg                     Com

mon



     HCL 10MG/ML HCL 10MG/ML 4-17                                              S

[facility]



               00:00:                                              - [facility]



                                                               [facility]

 

     Hyalgan 20 Hyalgan 20 2019-0      No             20mg                     C

ommon



     mg   mg   4-17                                              [facility]



               00:00:                                              - [facility]



                                                               [facility]

 

     Hyalgan 20 Hyalgan 20 2019-0      No             20mg                     C

ommon



     mg   mg   4-17                                              [facility]



               00:00:                                              - [facility]



                                                               [facility]

 

     LIDOCAINE LIDOCAINE 2019-0      No             10ug                     Com

mon



     HCL 10MG/ML HCL 10MG/ML 4-17                                              S

[facility]



               00:00:                                              - [facility]



                                                               [facility]

 

     LIDOCAINE LIDOCAINE 2019-0      No             10mg                     Com

mon



     HCL 10MG/ML HCL 10MG/ML 4-17                                              S

[facility]



               00:00:                                              - [facility]



                                                               [facility]

 

     Hyalgan 20 Hyalgan 20 2019-0      No             20mg                     C

ommon



     mg   mg   4-17                                              [facility]



               00:00:                                              - [facility]



                                                               [facility]

 

     Hyalgan 20 Hyalgan 20 2019-0      No             20mg                     C

ommon



     mg   mg   4-17                                              [facility]



               00:00:                                              - [facility]



                                                               [facility]

 

     LIDOCAINE LIDOCAINE 2019-0      No             10ug                     Com

mon



     HCL 10MG/ML HCL 10MG/ML 4-17                                              S

[facility]



               00:00:                                              - [facility]



                                                               [facility]

 

     LIDOCAINE LIDOCAINE 2019-0      No             10mg                     Com

mon



     HCL 10MG/ML HCL 10MG/ML 4-17                                              S

[facility]



               00:00:                                              - [facility]



                                                               [facility]

 

     Hyalgan 20 Hyalgan 20 2019-0      No             20mg                     C

ommon



     mg   mg   4-17                                              [facility]



               00:00:                                              - [facility]



                                                               [facility]

 

     Hyalgan 20 Hyalgan 20 2019-0      No             20mg                     C

ommon



     mg   mg   4-17                                              [facility]



               00:00:                                              - [facility]



                                                               [facility]

 

     LIDOCAINE LIDOCAINE 2019-0      No             10ug                     Com

mon



     HCL 10MG/ML HCL 10MG/ML 4-17                                              S

[facility]



               00:00:                                              - [facility]



                                                               [facility]

 

     LIDOCAINE LIDOCAINE 2019-0      No             10mg                     Com

mon



     HCL 10MG/ML HCL 10MG/ML 4-17                                              S

[facility]



               00:00:                                              - [facility]



                                                               [facility]

 

     Hyalgan 20 Hyalgan 20 2019-0      No             20mg                     C

ommon



     mg   mg   4-17                                              [facility]



               00:00:                                              - [facility]



                                                               [facility]

 

     Hyalgan 20 Hyalgan 20 2019-0      No             20mg                     C

ommon



     mg   mg   4-17                                              [facility]



               00:00:                                              - [facility]



                                                               [facility]

 

     LIDOCAINE LIDOCAINE 2019-0      No             10ug                     Com

mon



     HCL 10MG/ML HCL 10MG/ML 4-17                                              S

[facility]



               00:00:                                              - [facility]



                                                               [facility]

 

     LIDOCAINE LIDOCAINE 2019-0      No             10mg                     Com

mon



     HCL 10MG/ML HCL 10MG/ML 4-17                                              S

[facility]



               00:00:                                              - [facility]



                                                               [facility]

 

     Hyalgan 20 Hyalgan 20 2019-0      No             20mg                     C

ommon



     mg   mg   4-17                                              [facility]



               00:00:                                              - [facility]



                                                               [facility]

 

     Hyalgan 20 Hyalgan 20 2019-0      No             20mg                     C

om[facility]



     mg   mg   4-17                                              [facility]



               00:00:                                              - [facility]



                                                               [facility]

 

     LIDOCAINE LIDOCAINE 2019-0      No             10ug                     Com

mon



     HCL 10MG/ML HCL 10MG/ML 4-17                                              S

[facility]



               00:00:                                              - [facility]



                                                               [facility]

 

     LIDOCAINE LIDOCAINE 2019-0      No             10mg                     Com

mon



     HCL 10MG/ML HCL 10MG/ML 4-17                                              S

[facility]



               00:00:                                              - [facility]



                                                               [facility]

 

     Hyalgan 20 Hyalgan 20 2019-0      No             20mg                     C

ommon



     mg   mg   4-17                                              [facility]



               00:00:                                              - [facility]



                                                               [facility]

 

     Hyalgan 20 Hyalgan 20 2019-0      No             20mg                     C

ommon



     mg   mg   4-17                                              [facility]



               00:00:                                              - [facility]



                                                               [facility]

 

     LIDOCAINE LIDOCAINE 2019-0      No             10ug                     Com

mon



     HCL 10MG/ML HCL 10MG/ML 4-17                                              S

[facility]



               00:00:                                              - [facility]



                                                               [facility]

 

     LIDOCAINE LIDOCAINE 2019-0      No             10mg                     Com

mon



     HCL 10MG/ML HCL 10MG/ML 4-17                                              S

[facility]



               00:00:                                              - [facility]



                                                               [facility]

 

     Hyalgan 20 Hyalgan 20 2019-0      No             20mg                     C

om[facility]



     mg   mg   4-17                                              [facility]



               00:00:                                              - [facility]



                                                               [facility]

 

     Hyalgan 20 Hyalgan 20 2019-0      No             20mg                     C

om[facility]



     mg   mg   4-17                                              [facility]



               00:00:                                              - [facility]



                                                               [facility]

 

     LIDOCAINE LIDOCAINE 2019-0      No             10ug                     Com

mon



     HCL 10MG/ML HCL 10MG/ML 4-17                                              S

[facility]



               00:00:                                              - [facility]



                                                               [facility]

 

     LIDOCAINE LIDOCAINE 2019-0      No             10mg                     Com

mon



     HCL 10MG/ML HCL 10MG/ML 4-17                                              S

[facility]



               00:00:                                              - [facility]



                                                               [facility]

 

     Hyalgan 20 Hyalgan 20 2019-0      No             20mg                     C

ommon



     mg   mg   4-17                                              [facility]



               00:00:                                              - [facility]



                                                               [facility]

 

     Hyalgan 20 Hyalgan 20 2019-0      No             20mg                     C

ommon



     mg   mg   4-17                                              [facility]



               00:00:                                              - [facility]



                                                               [facility]

 

     LIDOCAINE LIDOCAINE 2019-0      No             10ug                     Com

mon



     HCL 10MG/ML HCL 10MG/ML 4-17                                              S

[facility]



               00:00:                                              - [facility]



                                                               [facility]

 

     LIDOCAINE LIDOCAINE 2019-0      No             10mg                     Com

mon



     HCL 10MG/ML HCL 10MG/ML 4-17                                              S

[facility]



               00:00:                                              - [facility]



                                                               [facility]

 

     Hyalgan 20 Hyalgan 20 2019-0      No             20mg                     C

ommon



     mg   mg   4-17                                              [facility]



               00:00:                                              - [facility]



                                                               [facility]

 

     Hyalgan 20 Hyalgan 20 2019-0      No             20mg                     C

ommon



     mg   mg   4-17                                              [facility]



               00:00:                                              - [facility]



                                                               [facility]

 

     LIDOCAINE LIDOCAINE -0      No             10ug                     Com

mon



     HCL 10MG/ML HCL 10MG/ML 4-17                                              S

[facility]



               00:00:                                              - [facility]



                                                               [facility]

 

     LIDOCAINE LIDOCAINE -0      No             10mg                     Com

mon



     HCL 10MG/ML HCL 10MG/ML 4-17                                              S

[facility]



               00:00:                                              - [facility]



                                                               [facility]

 

     aspirin      2017-0      Yes            81mg QD   Take 81 mg           Meth

jane



     (ECOTRIN)      8-16                               by mouth           st



     81 MG      14:29:                               daily.           [facility]



     enteric      11                                                l



     coated                                                        



     tablet                                                        

 

     pantoprazol      2017-0      Yes            40mg QD   Take 40 mg           

[facility]



     e         8-16                               by mouth           st



     (PROTONIX)      14:29:                               daily.           [facility]

ta



     40 MG EC      11                                                l



     tablet                                                        

 

     aspirin      2017-0      Yes            81mg QD   Take 81 mg           M[facility]

jane



     (ECOTRIN)      8-16                               by mouth           st



     81 MG      14:29:                               daily.           [facility]



     enteric      11                                                l



     coated                                                        



     tablet                                                        

 

     pantoprazol      2017-0      Yes            40mg QD   Take 40 mg           

[facility]



     e         8-16                               by mouth           st



     (PROTONIX)      14:29:                               daily.           [facility]

ta



     40 MG EC      11                                                l



     tablet                                                        

 

     aspirin      2017-0      Yes            81mg QD   Take 81 mg           M[facility]

jane



     (ECOTRIN)      8-16                               by mouth           st



     81 MG      14:29:                               daily.           [facility]



     enteric      11                                                l



     coated                                                        



     tablet                                                        

 

     pantoprazol      2017-0      Yes            40mg QD   Take 40 mg           

[facility]



     e         8-16                               by mouth           st



     (PROTONIX)      14:29:                               daily.           [facility]

ta



     40 MG EC      11                                                l



     tablet                                                        

 

     aspirin      2017-0      Yes            81mg QD   Take 81 mg           Meth

jane



     (ECOTRIN)      8-16                               by mouth           st



     81 MG      14:29:                               daily.           [facility]



     enteric      11                                                l



     coated                                                        



     tablet                                                        

 

     pantoprazol      2017-0      Yes            40mg QD   Take 40 mg           

[facility]



     e         8-16                               by mouth           st



     (PROTONIX)      14:29:                               daily.           [facility]

ta



     40 MG EC      11                                                l



     tablet                                                        

 

     rosuvastati            Yes                                     [facility]

i



     n (CRESTOR)      808                                              st



     10 MG      00:00:                                              [facility]



     tablet      00                                                l

 

     rosuvastati            Yes                                     [facility]

i



     n (CRESTOR)      808                                              st



     10 MG      00:00:                                              [facility]



     tablet      00                                                l

 

     rosuvastati      0      Yes                                     [facility]

i



     n (CRESTOR)      8                                              st



     10 MG      00:00:                                              [facility]



     tablet      00                                                l

 

     rosuvastati            Yes                                     [facility]

i



     n (CRESTOR)      8                                              st



     10 MG      00:00:                                              [facility]



     tablet      00                                                l

 

     hydroCHLORO            Yes                                     Method

i



     thiazide      8                                              st



     (HYDRODIURI      00:00:                                              Hospit

a



     L) 25 MG      00                                                l



     tablet                                                        

 

     hydroCHLORO            Yes                                     Method

i



     thiazide      8                                              st



     (HYDRODIURI      00:00:                                              Hospit

a



     L) 25 MG      00                                                l



     tablet                                                        

 

     hydroCHLORO            Yes                                     Method

i



     thiazide      8                                              st



     (HYDRODIURI      00:00:                                              Hospit

a



     L) 25 MG      00                                                l



     tablet                                                        

 

     hydroCHLORO            Yes                                     Method

i



     thiazide      8                                              st



     (HYDRODIURI      00:00:                                              Hospit

a



     L) 25 MG      00                                                l



     tablet                                                        

 

     metoprolol            Yes                                     [facility]



     succinate                                                    st



     XL        00:00:                                              [facility]



     (TOPROL-XL)      00                                                l



     25 mg 24 hr                                                        



     tablet                                                        

 

     metoprolol            Yes                                     [facility]



     succinate                                                    st



     XL        00:00:                                              [facility]



     (TOPROL-XL)      00                                                l



     25 mg 24 hr                                                        



     tablet                                                        

 

     metoprolol            Yes                                     [facility]



     succinate                                                    st



     XL        00:00:                                              [facility]



     (TOPROL-XL)      00                                                l



     25 mg 24 hr                                                        



     tablet                                                        

 

     metoprolol            Yes                                     [facility]



     succinate                                                    st



     XL        00:00:                                              [facility]



     (TOPROL-XL)      00                                                l



     25 mg 24 hr                                                        



     tablet                                                        

 

     quinapril            Yes                                     [facility]



     (ACCUPRIL)      7                                              st



     40 MG      00:00:                                              [facility]



     tablet      00                                                l

 

     quinapril            Yes                                     [facility]



     (ACCUPRIL)      7                                              st



     40 MG      00:00:                                              [facility]



     tablet      00                                                l

 

     quinapril            Yes                                     [facility]



     (ACCUPRIL)                                                    st



     40 MG      00:00:                                              [facility]



     tablet      00                                                l

 

     quinapril            Yes                                     [facility]



     (ACCUPRIL)                                                    st



     40 MG      00:00:                                              [facility]



     tablet      00                                                l

 

     Metoprolol Metoprolol           Yes  [facility]ales                1 tablet       

    Common



     Succinate Succinate                                                   [facility]

t



     ER   ER                                                     - [facility]



                                                                 [facility]

 

     Flonase Flonase           Yes  [facility] [place]ales                1 spray in           

Common



                                                  each           [facility]



                                                  nostril           [facility]

 

     Hydrochloro Hydrochloro           Yes  Na Bales                1 tablet     

      Common



     thiazide thiazide                                    in the           [facility]

t



                                                  morning           [facility]

 

     Fish Oil Fish Oil           Yes  Na Bales                1 capsule          

 Common



                                                                 [facility]



                                                                 [facility]

 

     Accupril Accupril           Yes  Na Bales                1 tablet           

Common



                                                                 [facility]

 

     Quinapril Quinapril           No                       Quinapril           



     HCl 40 MG HCl 40 MG                                    HCl 40 MG           

 

     Boostrix Boostrix           No                       Boostrix           

 

     amLODIPine amLODIPine           No                       amLODIPine        

   



     Besylate 5 Besylate 5                                    Besylate 5        

   



     MG   MG                                      MG             

 

     Zithromax Zithromax           No                  QD   Zithromax           



     Z-Bartolome 250 Z-Bartolome 250                                    Z-Bartolome 250           



     MG   MG                                      MG             

 

     predniSONE predniSONE           No                       predniSONE        

   

 

     Dicyclomine Dicyclomine           No                       Dicyclomin      

     



     HCl  HCl                                     e HCl           

 

     Fish Oil Fish Oil           No             1{capsu QD   Fish Oil           



     1000 MG 1000 MG                          le}       1000 MG           

 

     Benzonatate Benzonatate           No                       Benzonatat      

     



                                                  e              

 

     Benzonatate Benzonatate           No                       Benzonatat      

     



     100  MG                                    e 100 MG           

 

     Quinapril Quinapril           No                       Quinapril           



     HCl  HCl                                     HCl            

 

     Ondansetron Ondansetron           No                       Ondansetro      

     



     HCl  HCl                                     n HCl           

 

     Flonase 50 Flonase 50           No             1{spray QD   Flonase 50     

      



     MCG/ACT MCG/ACT                          _in_eac      MCG/ACT           



                                        h_nostr                     



                                        il}                      

 

     Accupril 40 Accupril 40           No             1{table QD   Accupril     

      



     MG   MG                            t}        40 MG           

 

     hydroCHLORO hydroCHLORO           No             1{table QD   hydroCHLOR   

        



     thiazide 25 thiazide 25                          t_in_th      Othiazide    

       



     MG   MG                            e_morni      25 MG           



                                        ng}                      

 

     Metoprolol Metoprolol           No                       Metoprolol        

   



     Succinate Succinate                                    Succinate           



      MG  MG                                     MG           

 

     Quinapril Quinapril           No                       Quinapril           



     HCl 40 MG HCl 40 MG                                    HCl 40 MG           

 

     Boostrix Boostrix           No                       Boostrix           

 

     amLODIPine amLODIPine           No                       amLODIPine        

   



     Besylate 5 Besylate 5                                    Besylate 5        

   



     MG   MG                                      MG             

 

     Zithromax Zithromax           No                  QD   Zithromax           



     Z-Bartolome 250 Z-Bartolome 250                                    Z-Bartolome 250           



     MG   MG                                      MG             

 

     predniSONE predniSONE           No                       predniSONE        

   

 

     Dicyclomine Dicyclomine           No                       Dicyclomin      

     



     HCl  HCl                                     e HCl           

 

     Fish Oil Fish Oil           No             1{capsu QD   Fish Oil           



     1000 MG 1000 MG                          le}       1000 MG           

 

     Benzonatate Benzonatate           No                       Benzonatat      

     



                                                  e              

 

     Benzonatate Benzonatate           No                       Benzonatat      

     



     100  MG                                    e 100 MG           

 

     Quinapril Quinapril           No                       Quinapril           



     HCl  HCl                                     HCl            

 

     Ondansetron Ondansetron           No                       Ondansetro      

     



     HCl  HCl                                     n HCl           

 

     Flonase 50 Flonase 50           No             1{spray QD   Flonase 50     

      



     MCG/ACT MCG/ACT                          _in_eac      MCG/ACT           



                                        h_nostr                     



                                        il}                      

 

     Accupril 40 Accupril 40           No             1{table QD   Accupril     

      



     MG   MG                            t}        40 MG           

 

     hydroCHLORO hydroCHLORO           No             1{table QD   hydroCHLOR   

        



     thiazide 25 thiazide 25                          t_in_th      Othiazide    

       



     MG   MG                            e_morni      25 MG           



                                        ng}                      

 

     Metoprolol Metoprolol           No                       Metoprolol        

   



     Succinate Succinate                                    Succinate           



      MG  MG                                     MG           

 

     Boostrix Boostrix           No                       Boostrix           

 

     Metoprolol Metoprolol           No                       Metoprolol        

   



     Succinate Succinate                                    Succinate           



      MG  MG                                     MG           

 

     predniSONE predniSONE           No                       predniSONE        

   

 

     Flonase 50 Flonase 50           No             1{spray QD   Flonase 50     

      



     MCG/ACT MCG/ACT                          _in_eac      MCG/ACT           



                                        h_nostr                     



                                        il}                      

 

     Quinapril Quinapril           No                       Quinapril           



     HCl 40 MG HCl 40 MG                                    HCl 40 MG           

 

     Dicyclomine Dicyclomine           No                       Dicyclomin      

     



     HCl  HCl                                     e HCl           

 

     amLODIPine amLODIPine           No                       amLODIPine        

   



     Besylate 5 Besylate 5                                    Besylate 5        

   



     MG   MG                                      MG             

 

     Benzonatate Benzonatate           No                       Benzonatat      

     



                                                  e              

 

     Zithromax Zithromax           No                  QD   Zithromax           



     Z-Bartolome 250 Z-Bartolome 250                                    Z-Bartoloem 250           



     MG   MG                                      MG             

 

     Accupril 40 Accupril 40           No             1{table QD   Accupril     

      



     MG   MG                            t}        40 MG           

 

     hydroCHLORO hydroCHLORO           No             1{table QD   hydroCHLOR   

        



     thiazide 25 thiazide 25                          t_in_th      Othiazide    

       



     MG   MG                            e_morni      25 MG           



                                        ng}                      

 

     Benzonatate Benzonatate           No                       Benzonatat      

     



     100  MG                                    e 100 MG           

 

     Fish Oil Fish Oil           No             1{capsu QD   Fish Oil           



     1000 MG 1000 MG                          le}       1000 MG           

 

     Quinapril Quinapril           No                       Quinapril           



     HCl  HCl                                     HCl            

 

     Ondansetron Ondansetron           No                       Ondansetro      

     



     HCl  HCl                                     n HCl           

 

     Ondansetron Ondansetron           No                       Ondansetro      

     



     HCl  HCl                                     n HCl           

 

     Boostrix Boostrix           No                       Boostrix           

 

     hydroCHLORO hydroCHLORO           No             1{table QD   hydroCHLOR   

        



     thiazide 25 thiazide 25                          t_in_th      Othiazide    

       



     MG   MG                            e_morni      25 MG           



                                        ng}                      

 

     Quinapril Quinapril           No                       Quinapril           



     HCl  HCl                                     HCl            

 

     Benzonatate Benzonatate           No                       Benzonatat      

     



     100  MG                                    e 100 MG           

 

     Metoprolol Metoprolol           No                       Metoprolol        

   



     Succinate Succinate                                    Succinate           



      MG  MG                                     MG           

 

     hydroCHLORO hydroCHLORO           No             1{table QD   hydroCHLOR   

        



     thiazide 25 thiazide 25                          t_in_th      Othiazide    

       



     MG   MG                            e_morni      25 MG           



                                        ng}                      

 

     Quinapril Quinapril           No                       Quinapril           



     HCl 40 MG HCl 40 MG                                    HCl 40 MG           

 

     Fish Oil Fish Oil           No             1{capsu QD   Fish Oil           



     1000 MG 1000 MG                          le}       1000 MG           

 

     Quinapril Quinapril           No                       Quinapril           



     HCl  HCl                                     HCl            

 

     Flonase 50 Flonase 50           No             1{spray QD   Flonase 50     

      



     MCG/ACT MCG/ACT                          _in_eac      MCG/ACT           



                                        h_nostr                     



                                        il}                      

 

     Ondansetron Ondansetron           No                       Ondansetro      

     



     HCl  HCl                                     n HCl           

 

     predniSONE predniSONE           No                       predniSONE        

   

 

     Boostrix Boostrix           No                       [facility]           

 

     Dicyclomine Dicyclomine           No                       Dicyclomin      

     



     HCl  HCl                                     e HCl           

 

     Zithromax Zithromax           No                  QD   Zithromax           



     Z-Bartolome 250 Z-Bartolome 250                                    Z-Bartolome 250           



     MG   MG                                      MG             

 

     amLODIPine amLODIPine           No                       amLODIPine        

   



     Besylate 5 Besylate 5                                    Besylate 5        

   



     MG   MG                                      MG             

 

     Accupril 40 Accupril 40           No             1{table QD   Accupril     

      



     MG   MG                            t}        40 MG           

 

     predniSONE predniSONE           No                       predniSONE        

   

 

     Benzonatate Benzonatate           No                       Benzonatat      

     



                                                  e              

 

     Accupril 40 Accupril 40           No             1{table QD   Accupril     

      



     MG   MG                            t}        40 MG           

 

     amLODIPine amLODIPine           No             1{table      amLODIPine     

      



     Besylate 5 Besylate 5                          t}        Besylate 5        

   



     MG   MG                                      MG             

 

     Benzonatate Benzonatate           No                       Benzonatat      

     



                                                  e              

 

     Quinapril Quinapril           No                       Quinapril           



     HCl  HCl                                     HCl            

 

     Benzonatate Benzonatate           No                       Benzonatat      

     



     100  MG                                    e 100 MG           

 

     Metoprolol Metoprolol           No                       Metoprolol        

   



     Succinate Succinate                                    Succinate           



      MG  MG                                     MG           

 

     Quinapril Quinapril           No                       Quinapril           



     HCl 40 MG HCl 40 MG                                    HCl 40 MG           

 

     hydroCHLORO hydroCHLORO           No             1{table QD   hydroCHLOR   

        



     thiazide 25 thiazide 25                          t_in_th      Othiazide    

       



     MG   MG                            e_morni      25 MG           



                                        ng}                      

 

     Quinapril Quinapril           No                       Quinapril           



     HCl 40 MG HCl 40 MG                                    HCl 40 MG           

 

     Fish Oil Fish Oil           No             1{capsu QD   Fish Oil           



     1000 MG 1000 MG                          le}       1000 MG           

 

     Flonase 50 Flonase 50           No             1{spray QD   Flonase 50     

      



     MCG/ACT MCG/ACT                          _in_eac      MCG/ACT           



                                        h_nostr                     



                                        il}                      

 

     Ondansetron Ondansetron           No                       Ondansetro      

     



     HCl  HCl                                     n HCl           

 

     predniSONE predniSONE           No                       predniSONE        

   

 

     Bo[employer]rix Boostrix           No                       [facility]           

 

     Dicyclomine Dicyclomine           No                       Dicyclomin      

     



     HCl  HCl                                     e HCl           

 

     Metoprolol Metoprolol           No             1{table      Metoprolol     

      



     Succinate Succinate                          t}        Succinate           



      MG  MG                                     MG           

 

     Zithromax Zithromax           No                  QD   Zithromax           



     Z-Bartolome 250 Z-Bartolome 250                                    Z-Bartolome 250           



     MG   MG                                      MG             

 

     amLODIPine amLODIPine           No                       amLODIPine        

   



     Besylate 5 Besylate 5                                    Besylate 5        

   



     MG   MG                                      MG             

 

     Accupril 40 Accupril 40           No             1{table QD   Accupril     

      



     MG   MG                            t}        40 MG           

 

     Benzonatate Benzonatate           No                       Benzonatat      

     



                                                  e              

 

     Dicyclomine Dicyclomine           No                       Dicyclomin      

     



     HCl  HCl                                     e HCl           

 

     hydroCHLORO hydroCHLORO           No             1{table QD   hydroCHLOR   

        



     thiazide 25 thiazide 25                          t_in_th      Othiazide    

       



     MG   MG                            e_morni      25 MG           



                                        ng}                      

 

     Benzonatate Benzonatate           No                       Benzonatat      

     



     100  MG                                    e 100 MG           

 

     Benzonatate Benzonatate           No                       Benzonatat      

     



                                                  e              

 

     Benzonatate Benzonatate           No                       Benzonatat      

     



     100  MG                                    e 100 MG           

 

     Quinapril Quinapril           No                       Quinapril           



     HCl  HCl                                     HCl            

 

     Metoprolol Metoprolol           No                       Metoprolol        

   



     Succinate Succinate                                    Succinate           



      MG  MG                                     MG           

 

     Quinapril Quinapril           No                       Quinapril           



     HCl 40 MG HCl 40 MG                                    HCl 40 MG           

 

     Fish Oil Fish Oil           No             1{capsu QD   Fish Oil           



     1000 MG 1000 MG                          le}       1000 MG           

 

     Flonase 50 Flonase 50           No             1{spray QD   Flonase 50     

      



     MCG/ACT MCG/ACT                          _in_eac      MCG/ACT           



                                        h_nostr                     



                                        il}                      

 

     Ondansetron Ondansetron           No                       Ondansetro      

     



     HCl  HCl                                     n HCl           

 

     Fish Oil Fish Oil           No             1{capsu QD   Fish Oil           



     1000 MG 1000 MG                          le}       1000 MG           

 

     predniSONE predniSONE           No                       predniSONE        

   

 

     Dicyclomine Dicyclomine           No                       Dicyclomin      

     



     HCl  HCl                                     e HCl           

 

     Zithromax Zithromax           No                  QD   Zithromax           



     Z-Bartolome 250 Z-Bartolome 250                                    Z-Bartolome 250           



     MG   MG                                      MG             

 

     [employer]rix Boostrix           No                       [facility]           

 

     hydroCHLORO hydroCHLORO           No             1{table QD   hydroCHLOR   

        



     thiazide 25 thiazide 25                          t_in_th      Othiazide    

       



     MG   MG                            e_morni      25 MG           



                                        ng}                      

 

     amLODIPine amLODIPine           No                       amLODIPine        

   



     Besylate 5 Besylate 5                                    Besylate 5        

   



     MG   MG                                      MG             

 

     Accupril 40 Accupril 40           No             1{table QD   Accupril     

      



     MG   MG                            t}        40 MG           

 

     Flonase 50 Flonase 50           No             1{spray QD   Flonase 50     

      



     MCG/ACT MCG/ACT                          _in_eac      MCG/ACT           



                                        h_nostr                     



                                        il}                      

 

     Benzonatate Benzonatate           No             1{capsu      Benzonatat   

        



     100  MG                          le_as_n      e 100 MG           



                                        eeded}                     

 

     Metoprolol Metoprolol           No                       Metoprolol        

   



     Succinate Succinate                                    Succinate           



      MG  MG                                     MG           

 

     Flonase 50 Flonase 50           No             1{spray QD   Flonase 50     

      



     MCG/ACT MCG/ACT                          _in_eac      MCG/ACT           



                                        h_nostr                     



                                        il}                      

 

     Accupril 40 Accupril 40           No             1{table QD   Accupril     

      



     MG   MG                            t}        40 MG           

 

     Benzonatate Benzonatate           No                       Benzonatat      

     



     100  MG                                    e 100 MG           

 

     Fish Oil Fish Oil           No             1{capsu QD   Fish Oil           



     1000 MG 1000 MG                          le}       1000 MG           

 

     Dicyclomine Dicyclomine           No                       Dicyclomin      

     



     HCl  HCl                                     e HCl           

 

     predniSONE predniSONE           No                       predniSONE        

   

 

     Ondansetron Ondansetron           No                       Ondansetro      

     



     HCl  HCl                                     n HCl           

 

     hydroCHLORO hydroCHLORO           No             1{table QD   hydroCHLOR   

        



     thiazide 25 thiazide 25                          t_in_th      Othiazide    

       



     MG   MG                            e_morni      25 MG           



                                        ng}                      

 

     Zithromax Zithromax           No                  QD   Zithromax           



     Z-Bartolome 250 Z-Bartolome 250                                    Z-Bartolome 250           



     MG   MG                                      MG             

 

     [employer] Boostrix           No                       [facility]           

 

     Quinapril Quinapril           No                       Quinapril           



     HCl  HCl                                     HCl            

 

     amLODIPine amLODIPine           No                       amLODIPine        

   



     Besylate 5 Besylate 5                                    Besylate 5        

   



     MG   MG                                      MG             

 

     Benzonatate Benzonatate           No                       Benzonatat      

     



                                                  e              

 

     Quinapril Quinapril           No                       Quinapril           



     HCl 40 MG HCl 40 MG                                    HCl 40 MG           

 

     Metoprolol Metoprolol           No                       Metoprolol        

   



     Succinate Succinate                                    Succinate           



      MG  MG                                     MG           

 

     Flonase 50 Flonase 50           No             1{spray QD   Flonase 50     

      



     MCG/ACT MCG/ACT                          _in_eac      MCG/ACT           



                                        h_nostr                     



                                        il}                      

 

     Accupril 40 Accupril 40           No             1{table QD   Accupril     

      



     MG   MG                            t}        40 MG           

 

     Benzonatate Benzonatate           No                       Benzonatat      

     



     100  MG                                    e 100 MG           

 

     Fish Oil Fish Oil           No             1{capsu QD   Fish Oil           



     1000 MG 1000 MG                          le}       1000 MG           

 

     Dicyclomine Dicyclomine           No                       Dicyclomin      

     



     HCl  HCl                                     e HCl           

 

     predniSONE predniSONE           No                       predniSONE        

   

 

     Ondansetron Ondansetron           No                       Ondansetro      

     



     HCl  HCl                                     n HCl           

 

     hydroCHLORO hydroCHLORO           No             1{table QD   hydroCHLOR   

        



     thiazide 25 thiazide 25                          t_in_th      Othiazide    

       



     MG   MG                            e_morni      25 MG           



                                        ng}                      

 

     Zithromax Zithromax           No                  QD   Zithromax           



     Z-Bartolome 250 Z-Bartolome 250                                    Z-Bartolome 250           



     MG   MG                                      MG             

 

     [facility] Boostrix           No                       [facility]           

 

     Quinapril Quinapril           No                       Quinapril           



     HCl  HCl                                     HCl            

 

     amLODIPine amLODIPine           No                       amLODIPine        

   



     Besylate 5 Besylate 5                                    Besylate 5        

   



     MG   MG                                      MG             

 

     Benzonatate Benzonatate           No                       Benzonatat      

     



                                                  e              

 

     Quinapril Quinapril           No                       Quinapril           



     HCl 40 MG HCl 40 MG                                    HCl 40 MG           

 

     Metoprolol Metoprolol           No                       Metoprolol        

   



     Succinate Succinate                                    Succinate           



      MG  MG                                     MG           

 

     Flonase 50 Flonase 50           No             1{spray QD   Flonase 50     

      



     MCG/ACT MCG/ACT                          _in_eac      MCG/ACT           



                                        h_nostr                     



                                        il}                      

 

     Accupril 40 Accupril 40           No             1{table QD   Accupril     

      



     MG   MG                            t}        40 MG           

 

     Benzonatate Benzonatate           No                       Benzonatat      

     



     100  MG                                    e 100 MG           

 

     Fish Oil Fish Oil           No             1{capsu QD   Fish Oil           



     1000 MG 1000 MG                          le}       1000 MG           

 

     Dicyclomine Dicyclomine           No                       Dicyclomin      

     



     HCl  HCl                                     e HCl           

 

     predniSONE predniSONE           No                       predniSONE        

   

 

     Ondansetron Ondansetron           No                       Ondansetro      

     



     HCl  HCl                                     n HCl           

 

     hydroCHLORO hydroCHLORO           No             1{table QD   hydroCHLOR   

        



     thiazide 25 thiazide 25                          t_in_th      Othiazide    

       



     MG   MG                            e_morni      25 MG           



                                        ng}                      

 

     Zithromax Zithromax           No                  QD   Zithromax           



     Z-Bartolome 250 Z-Bartolome 250                                    Z-Bartolome 250           



     MG   MG                                      MG             

 

     [facility] Boostrix           No                       [facility]           

 

     Quinapril Quinapril           No                       Quinapril           



     HCl  HCl                                     HCl            

 

     amLODIPine amLODIPine           No                       amLODIPine        

   



     Besylate 5 Besylate 5                                    Besylate 5        

   



     MG   MG                                      MG             

 

     Benzonatate Benzonatate           No                       Benzonatat      

     



                                                  e              

 

     Quinapril Quinapril           No                       Quinapril           



     HCl 40 MG HCl 40 MG                                    HCl 40 MG           

 

     predniSONE predniSONE           No                       predniSONE        

   

 

     Accupril 40 Accupril 40           No             1{table QD   Accupril     

      



     MG   MG                            t}        40 MG           

 

     [facility] Boostrix           No                       [facility]           

 

     Benzonatate Benzonatate           No                       Benzonatat      

     



                                                  e              

 

     Quinapril Quinapril           No                       Quinapril           



     HCl  HCl                                     HCl            

 

     Flonase 50 Flonase 50           No             1{spray QD   Flonase 50     

      



     MCG/ACT MCG/ACT                          _in_eac      MCG/ACT           



                                        h_nostr                     



                                        il}                      

 

     Quinapril Quinapril           No                       Quinapril           



     HCl 40 MG HCl 40 MG                                    HCl 40 MG           

 

     Benzonatate Benzonatate           No                       Benzonatat      

     



     100  MG                                    e 100 MG           

 

     Zithromax Zithromax           No                  QD   Zithromax           



     Z-Bartolome 250 Z-Bartolome 250                                    Z-Bartolome 250           



     MG   MG                                      MG             

 

     hydroCHLORO hydroCHLORO           No             1{table QD   hydroCHLOR   

        



     thiazide 25 thiazide 25                          t_in_th      Othiazide    

       



     MG   MG                            e_morni      25 MG           



                                        ng}                      

 

     Fish Oil Fish Oil           No             1{capsu QD   Fish Oil           



     1000 MG 1000 MG                          le}       1000 MG           

 

     Ondansetron Ondansetron           No                       Ondansetro      

     



     HCl  HCl                                     n HCl           

 

     Metoprolol Metoprolol           No                       Metoprolol        

   



     Succinate Succinate                                    Succinate           



      MG  MG                                     MG           

 

     amLODIPine amLODIPine           No                       amLODIPine        

   



     Besylate 5 Besylate 5                                    Besylate 5        

   



     MG   MG                                      MG             

 

     Dicyclomine Dicyclomine           No                       Dicyclomin      

     



     HCl  HCl                                     e HCl           

 

     [employer] Boostrix           No                       [facility]           

 

     Benzonatate Benzonatate           No                       Benzonatat      

     



                                                  e              

 

     amLODIPine amLODIPine           No                       amLODIPine        

   



     Besylate 5 Besylate 5                                    Besylate 5        

   



     MG   MG                                      MG             

 

     hydroCHLORO hydroCHLORO           No             1{table QD   hydroCHLOR   

        



     thiazide 25 thiazide 25                          t_in_th      Othiazide    

       



     MG   MG                            e_morni      25 MG           



                                        ng}                      

 

     hydroCHLORO hydroCHLORO           No             1{table QD   hydroCHLOR   

        



     thiazide 25 thiazide 25                          t_in_th      Othiazide    

       



     MG   MG                            e_morni      25 MG           



                                        ng}                      

 

     Metoprolol Metoprolol           No                       Metoprolol        

   



     Succinate Succinate                                    Succinate           



      MG  MG                                     MG           

 

     Ondansetron Ondansetron           No                       Ondansetro      

     



     HCl  HCl                                     n HCl           

 

     Metoprolol Metoprolol           No             1{table      Metoprolol     

      



     Succinate Succinate                          t}        Succinate           



      MG  MG                                     MG           

 

     Quinapril Quinapril           No                       Quinapril           



     HCl 40 MG HCl 40 MG                                    HCl 40 MG           

 

     Quinapril Quinapril           No                       Quinapril           



     HCl  HCl                                     HCl            

 

     Flonase 50 Flonase 50           No             1{spray QD   Flonase 50     

      



     MCG/ACT MCG/ACT                          _in_eac      MCG/ACT           



                                        h_nostr                     



                                        il}                      

 

     Dicyclomine Dicyclomine           No                       Dicyclomin      

     



     HCl  HCl                                     e HCl           

 

     predniSONE predniSONE           No                       predniSONE        

   

 

     Benzonatate Benzonatate           No                       Benzonatat      

     



     100  MG                                    e 100 MG           

 

     amLODIPine amLODIPine           No             1{table      amLODIPine     

      



     Besylate 5 Besylate 5                          t}        Besylate 5        

   



     MG   MG                                      MG             

 

     Accupril 40 Accupril 40           No             1{table QD   Accupril     

      



     MG   MG                            t}        40 MG           

 

     Fish Oil Fish Oil           No             1{capsu QD   Fish Oil           



     1000 MG 1000 MG                          le}       1000 MG           

 

     Zithromax Zithromax           No                  QD   Zithromax           



     Z-Bartolome 250 Z-Bartolome 250                                    Z-Bartolome 250           



     MG   MG                                      MG             

 

     [employer] Boostrix           No                       [employer]rix           

 

     Benzonatate Benzonatate           No                       Benzonatat      

     



                                                  e              

 

     amLODIPine amLODIPine           No                       amLODIPine        

   



     Besylate 5 Besylate 5                                    Besylate 5        

   



     MG   MG                                      MG             

 

     hydroCHLORO hydroCHLORO           No             1{table QD   hydroCHLOR   

        



     thiazide 25 thiazide 25                          t_in_th      Othiazide    

       



     MG   MG                            e_morni      25 MG           



                                        ng}                      

 

     hydroCHLORO hydroCHLORO           No             1{table QD   hydroCHLOR   

        



     thiazide 25 thiazide 25                          t_in_th      Othiazide    

       



     MG   MG                            e_morni      25 MG           



                                        ng}                      

 

     Metoprolol Metoprolol           No                       Metoprolol        

   



     Succinate Succinate                                    Succinate           



      MG  MG                                     MG           

 

     Ondansetron Ondansetron           No                       Ondansetro      

     



     HCl  HCl                                     n HCl           

 

     Metoprolol Metoprolol           No             1{table      Metoprolol     

      



     Succinate Succinate                          t}        Succinate           



      MG  MG                                     MG           

 

     Quinapril Quinapril           No                       Quinapril           



     HCl 40 MG HCl 40 MG                                    HCl 40 MG           

 

     Quinapril Quinapril           No                       Quinapril           



     HCl  HCl                                     HCl            

 

     Flonase 50 Flonase 50           No             1{spray QD   Flonase 50     

      



     MCG/ACT MCG/ACT                          _in_eac      MCG/ACT           



                                        h_nostr                     



                                        il}                      

 

     Dicyclomine Dicyclomine           No                       Dicyclomin      

     



     HCl  HCl                                     e HCl           

 

     predniSONE predniSONE           No                       predniSONE        

   

 

     Benzonatate Benzonatate           No                       Benzonatat      

     



     100  MG                                    e 100 MG           

 

     amLODIPine amLODIPine           No             1{table      amLODIPine     

      



     Besylate 5 Besylate 5                          t}        Besylate 5        

   



     MG   MG                                      MG             

 

     Accupril 40 Accupril 40           No             1{table QD   Accupril     

      



     MG   MG                            t}        40 MG           

 

     Benzonatate Benzonatate           No                       Benzonatat      

     



     100  MG                                    e 100 MG           

 

     Fish Oil Fish Oil           No             1{capsu QD   Fish Oil           



     1000 MG 1000 MG                          le}       1000 MG           

 

     Zithromax Zithromax           No                  QD   Zithromax           



     Z-Bartolome 250 Z-Bartolome 250                                    Z-Bartolome 250           



     MG   MG                                      MG             

 

     hydroCHLORO hydroCHLORO           No             1{table QD   hydroCHLOR   

        



     thiazide 25 thiazide 25                          t_in_th      Othiazide    

       



     MG   MG                            e_morni      25 MG           



                                        ng}                      

 

     Quinapril Quinapril           No                       Quinapril           



     HCl  HCl                                     HCl            

 

     predniSONE predniSONE           No                       predniSONE        

   

 

     Accupril 40 Accupril 40           No             1{table QD   Accupril     

      



     MG   MG                            t}        40 MG           

 

     Magnesium Magnesium           No                       Magnesium           

 

     amLODIPine amLODIPine           No                       amLODIPine        

   



     Besylate 5 Besylate 5                                    Besylate 5        

   



     MG   MG                                      MG             

 

     Benzonatate Benzonatate           No                       Benzonatat      

     



                                                  e              

 

     Metoprolol Metoprolol           No                       Metoprolol        

   



     Succinate Succinate                                    Succinate           



      MG  MG                                     MG           

 

     Potassium Potassium           No                       Potassium           

 

     Metoprolol Metoprolol           No             1{table      Metoprolol     

      



     Succinate Succinate                          t}        Succinate           



      MG  MG                                     MG           

 

     Dicyclomine Dicyclomine           No                       Dicyclomin      

     



     HCl  HCl                                     e HCl           

 

     amLODIPine amLODIPine           No             1{table      amLODIPine     

      



     Besylate 5 Besylate 5                          t}        Besylate 5        

   



     MG   MG                                      MG             

 

     Quinapril Quinapril           No                       Quinapril           



     HCl  HCl                                     HCl            

 

     Ondansetron Ondansetron           No                       Ondansetro      

     



     HCl  HCl                                     n HCl           

 

     predniSONE predniSONE           No                       predniSONE        

   

 

     Quinapril Quinapril           No                       Quinapril           



     HCl 40 MG HCl 40 MG                                    HCl 40 MG           

 

     Flonase 50 Flonase 50           No             1{spray QD   Flonase 50     

      



     MCG/ACT MCG/ACT                          _in_eac      MCG/ACT           



                                        h_nostr                     



                                        il}                      

 

     Accupril 40 Accupril 40           No             1{table QD   Accupril     

      



     MG   MG                            t}        40 MG           

 

     Fish Oil Fish Oil           No             1{capsu QD   Fish Oil           



     1000 MG 1000 MG                          le}       1000 MG           

 

     Boostrix Boostrix           No                       Boostrix           

 

     Benzonatate Benzonatate           No                       Benzonatat      

     



                                                  e              

 

     hydroCHLORO hydroCHLORO           No             1{table QD   hydroCHLOR   

        



     thiazide 25 thiazide 25                          t_in_th      Othiazide    

       



     MG   MG                            e_morni      25 MG           



                                        ng}                      

 

     Zithromax Zithromax           No                  QD   Zithromax           



     Z-Bartolome 250 Z-Bartolome 250                                    Z-Bartolome 250           



     MG   MG                                      MG             

 

     hydroCHLORO hydroCHLORO           No             1{table QD   hydroCHLOR   

        



     thiazide 25 thiazide 25                          t_in_th      Othiazide    

       



     MG   MG                            e_morni      25 MG           



                                        ng}                      

 

     Benzonatate Benzonatate           No                       Benzonatat      

     



     100  MG                                    e 100 MG           

 

     amLODIPine amLODIPine           No             1{table      amLODIPine     

      



     Besylate 5 Besylate 5                          t}        Besylate 5        

   



     MG   MG                                      MG             

 

     Quinapril Quinapril           No                       Quinapril           



     HCl 40 MG HCl 40 MG                                    HCl 40 MG           

 

     Metoprolol Metoprolol           No             1{table      Metoprolol     

      



     Succinate Succinate                          t}        Succinate           



      MG  MG                                     MG           

 

     Boostrix Boostrix           No                       Boostrix           

 

     hydroCHLORO hydroCHLORO           No             1{table QD   hydroCHLOR   

        



     thiazide 25 thiazide 25                          t_in_th      Othiazide    

       



     MG   MG                            e_morni      25 MG           



                                        ng}                      

 

     predniSONE predniSONE           No                       predniSONE        

   

 

     Metoprolol Metoprolol           No                       Metoprolol        

   



     Succinate Succinate                                    Succinate           



      MG  MG                                     MG           

 

     Boostrix Boostrix           No                       Boostrix           

 

     Dicyclomine Dicyclomine           No                       Dicyclomin      

     



     HCl  HCl                                     e HCl           

 

     Dicyclomine Dicyclomine           No                       Dicyclomin      

     



     HCl  HCl                                     e HCl           

 

     Potassium Potassium           No                       Potassium           

 

     Quinapril Quinapril           No                       Quinapril           



     HCl  HCl                                     HCl            

 

     Magnesium Magnesium           No                       Magnesium           

 

     amLODIPine amLODIPine           No                       amLODIPine        

   



     Besylate 5 Besylate 5                                    Besylate 5        

   



     MG   MG                                      MG             

 

     hydroCHLORO hydroCHLORO           No             1{table QD   hydroCHLOR   

        



     thiazide 25 thiazide 25                          t_in_th      Othiazide    

       



     MG   MG                            e_morni      25 MG           



                                        ng}                      

 

     Accupril 40 Accupril 40           No             1{table QD   Accupril     

      



     MG   MG                            t}        40 MG           

 

     amLODIPine amLODIPine           No             1{table      amLODIPine     

      



     Besylate 5 Besylate 5                          t}        Besylate 5        

   



     MG   MG                                      MG             

 

     Zithromax Zithromax           No                  QD   Zithromax           



     Z-Bartolome 250 Z-Bartolome 250                                    Z-Bartolome 250           



     MG   MG                                      MG             

 

     Fish Oil Fish Oil           No             1{capsu QD   Fish Oil           



     1000 MG 1000 MG                          le}       1000 MG           

 

     Azithromyci Azithromyci           No                  QD   Azithromyc      

     



     n 250 MG n 250 MG                                    in 250 MG           

 

     Fish Oil Fish Oil           No             1{capsu QD   Fish Oil           



     1000 MG 1000 MG                          le}       1000 MG           

 

     hydroCHLORO hydroCHLORO           No             1{table QD   hydroCHLOR   

        



     thiazide 25 thiazide 25                          t_in_th      Othiazide    

       



     MG   MG                            e_morni      25 MG           



                                        ng}                      

 

     Ondansetron Ondansetron           No                       Ondansetro      

     



     HCl  HCl                                     n HCl           

 

     Benzonatate Benzonatate           No                       Benzonatat      

     



                                                  e              

 

     Metoprolol Metoprolol           No             1{table      Metoprolol     

      



     Succinate Succinate                          t}        Succinate           



      MG  MG                                     MG           

 

     Flonase 50 Flonase 50           No             1{spray QD   Flonase 50     

      



     MCG/ACT MCG/ACT                          _in_eac      MCG/ACT           



                                        h_nostr                     



                                        il}                      

 

     Flonase 50 Flonase 50           No             1{spray QD   Flonase 50     

      



     MCG/ACT MCG/ACT                          _in_eac      MCG/ACT           



                                        h_nostr                     



                                        il}                      

 

     Benzonatate Benzonatate           No                       Benzonatat      

     



     100  MG                                    e 100 MG           

 

     Albuterol Albuterol           No             2{puff_ 6xD  Albuterol        

   



     Sulfate HFA Sulfate HFA                          as_need      Sulfate      

     



     108 (90 108 (90                          ed}                  



     Base) Base)                                    (90 Base)           



     MCG/ACT MCG/ACT                                    MCG/ACT           

 

     Benzonatate Benzonatate           No             1{capsu TID  Benzonatat   

        



     200  MG                          le}       e 200 MG           

 

     Quinapril Quinapril           No                       Quinapril           



     HCl 40 MG HCl 40 MG                                    HCl 40 MG           

 

     Ondansetron Ondansetron           No                       Ondansetro      

     



     HCl  HCl                                     n HCl           

 

     predniSONE predniSONE           No                       predniSONE        

   

 

     Metoprolol Metoprolol           No                       Metoprolol        

   



     Succinate Succinate                                    Succinate           



      MG  MG                                     MG           

 

     Boostrix Boostrix           No                       Boostrix           

 

     Dicyclomine Dicyclomine           No                       Dicyclomin      

     



     HCl  HCl                                     e HCl           

 

     Potassium Potassium           No                       Potassium           

 

     Quinapril Quinapril           No                       Quinapril           



     HCl  HCl                                     HCl            

 

     Magnesium Magnesium           No                       Magnesium           

 

     amLODIPine amLODIPine           No                       amLODIPine        

   



     Besylate 5 Besylate 5                                    Besylate 5        

   



     MG   MG                                      MG             

 

     hydroCHLORO hydroCHLORO           No             1{table QD   hydroCHLOR   

        



     thiazide 25 thiazide 25                          t_in_th      Othiazide    

       



     MG   MG                            e_morni      25 MG           



                                        ng}                      

 

     Accupril 40 Accupril 40           No             1{table QD   Accupril     

      



     MG   MG                            t}        40 MG           

 

     amLODIPine amLODIPine           No             1{table      amLODIPine     

      



     Besylate 5 Besylate 5                          t}        Besylate 5        

   



     MG   MG                                      MG             

 

     Zithromax Zithromax           No                  QD   Zithromax           



     Z-Bartolome 250 Z-Bartolome 250                                    Z-Bartolome 250           



     MG   MG                                      MG             

 

     Azithromyci Azithromyci           No                  QD   Azithromyc      

     



     n 250 MG n 250 MG                                    in 250 MG           

 

     Fish Oil Fish Oil           No             1{capsu QD   Fish Oil           



     1000 MG 1000 MG                          le}       1000 MG           

 

     hydroCHLORO hydroCHLORO           No             1{table QD   hydroCHLOR   

        



     thiazide 25 thiazide 25                          t_in_th      Othiazide    

       



     MG   MG                            e_morni      25 MG           



                                        ng}                      

 

     Ondansetron Ondansetron           No                       Ondansetro      

     



     HCl  HCl                                     n HCl           

 

     Benzonatate Benzonatate           No                       Benzonatat      

     



                                                  e              

 

     Metoprolol Metoprolol           No             1{table      Metoprolol     

      



     Succinate Succinate                          t}        Succinate           



      MG  MG                                     MG           

 

     Quinapril Quinapril           No                       Quinapril           



     HCl 40 MG HCl 40 MG                                    HCl 40 MG           

 

     Flonase 50 Flonase 50           No             1{spray QD   Flonase 50     

      



     MCG/ACT MCG/ACT                          _in_eac      MCG/ACT           



                                        h_nostr                     



                                        il}                      

 

     Benzonatate Benzonatate           No                       Benzonatat      

     



     100  MG                                    e 100 MG           

 

     Albuterol Albuterol           No             2{puff_ 6xD  Albuterol        

   



     Sulfate HFA Sulfate HFA                          as_need      Sulfate      

     



     108 (90 108 (90                          ed}                  



     Base) Base)                                    (90 Base)           



     MCG/ACT MCG/ACT                                    MCG/ACT           

 

     Benzonatate Benzonatate           No             1{capsu TID  Benzonatat   

        



     200  MG                          le}       e 200 MG           

 

     Quinapril Quinapril           No                       Quinapril           



     HCl 40 MG HCl 40 MG                                    HCl 40 MG           

 

     Flonase 50 Flonase 50           No             1{spray QD   Flonase 50     

      



     MCG/ACT MCG/ACT                          _in_eac      MCG/ACT           



                                        h_nostr                     



                                        il}                      

 

     Metoprolol Metoprolol           No             1{table      Metoprolol     

      



     Succinate Succinate                          t}        Succinate           



      MG  MG                                     MG           

 

     Ondansetron Ondansetron           No                       Ondansetro      

     



     HCl  HCl                                     n HCl           

 

     predniSONE predniSONE           No                       predniSONE        

   

 

     amLODIPine amLODIPine           No                       amLODIPine        

   



     Besylate 5 Besylate 5                                    Besylate 5        

   



     MG   MG                                      MG             

 

     Boostrix Boostrix           No                       Boostrix           

 

     Dicyclomine Dicyclomine           No                       Dicyclomin      

     



     HCl  HCl                                     e HCl           

 

     Metoprolol Metoprolol           No                       Metoprolol        

   



     Succinate Succinate                                    Succinate           



      MG  MG                                     MG           

 

     Potassium Potassium           No                       Potassium           

 

     Accupril 40 Accupril 40           No             1{table QD   Accupril     

      



     MG   MG                            t}        40 MG           

 

     Magnesium Magnesium           No                       Magnesium           

 

     Quinapril Quinapril           No                       Quinapril           



     HCl  HCl                                     HCl            

 

     amLODIPine amLODIPine           No             1{table      amLODIPine     

      



     Besylate 5 Besylate 5                          t}        Besylate 5        

   



     MG   MG                                      MG             

 

     Ondansetron Ondansetron           No                       Ondansetro      

     



     HCl  HCl                                     n HCl           

 

     hydroCHLORO hydroCHLORO           No             1{table QD   hydroCHLOR   

        



     thiazide 25 thiazide 25                          t_in_th      Othiazide    

       



     MG   MG                            e_morni      25 MG           



                                        ng}                      

 

     Metoprolol Metoprolol           No             1{table      Metoprolol     

      



     Succinate Succinate                          t}        Succinate           



      MG  MG                                     MG           

 

     Zithromax Zithromax           No                  QD   Zithromax           



     Z-Bartolome 250 Z-Bartolome 250                                    Z-Bartolome 250           



     MG   MG                                      MG             

 

     Boostrix Boostrix           No                       Boostrix           

 

     Azithromyci Azithromyci           No                  QD   Azithromyc      

     



     n 250 MG n 250 MG                                    in 250 MG           

 

     Fish Oil Fish Oil           No             1{capsu QD   Fish Oil           



     1000 MG 1000 MG                          le}       1000 MG           

 

     Benzonatate Benzonatate           No                       Benzonatat      

     



                                                  e              

 

     hydroCHLORO hydroCHLORO           No             1{table QD   hydroCHLOR   

        



     thiazide 25 thiazide 25                          t_in_th      Othiazide    

       



     MG   MG                            e_morni      25 MG           



                                        ng}                      

 

     Dicyclomine Dicyclomine           No                       Dicyclomin      

     



     HCl  HCl                                     e HCl           

 

     Flonase 50 Flonase 50           No             1{spray QD   Flonase 50     

      



     MCG/ACT MCG/ACT                          _in_eac      MCG/ACT           



                                        h_nostr                     



                                        il}                      

 

     Benzonatate Benzonatate           No                       Benzonatat      

     



                                                  e              

 

     Benzonatate Benzonatate           No                       Benzonatat      

     



     100  MG                                    e 100 MG           

 

     Albuterol Albuterol           No             2{puff_ 6xD  Albuterol        

   



     Sulfate HFA Sulfate HFA                          as_need      Sulfate      

     



     108 (90 108 (90                          ed}                  



     Base) Base)                                    (90 Base)           



     MCG/ACT MCG/ACT                                    MCG/ACT           

 

     Benzonatate Benzonatate           No             1{capsu TID  Benzonatat   

        



     200  MG                          le}       e 200 MG           

 

     Quinapril Quinapril           No                       Quinapril           



     HCl 40 MG HCl 40 MG                                    HCl 40 MG           

 

     Fish Oil Fish Oil           No             1{capsu QD   Fish Oil           



     1000 MG 1000 MG                          le}       1000 MG           

 

     Quinapril Quinapril           No                       Quinapril           



     HCl  HCl                                     HCl            

 

     hydroCHLORO hydroCHLORO           No             1{table QD   hydroCHLOR   

        



     thiazide 25 thiazide 25                          t_in_th      Othiazide    

       



     MG   MG                            e_morni      25 MG           



                                        ng}                      

 

     predniSONE predniSONE           No                       predniSONE        

   

 

     amLODIPine amLODIPine           No                       amLODIPine        

   



     Besylate 5 Besylate 5                                    Besylate 5        

   



     MG   MG                                      MG             

 

     Boostrix Boostrix           No                       Boostrix           

 

     Metoprolol Metoprolol           No                       Metoprolol        

   



     Succinate Succinate                                    Succinate           



      MG  MG                                     MG           

 

     Potassium Potassium           No                       Potassium           

 

     Accupril 40 Accupril 40           No             1{table QD   Accupril     

      



     MG   MG                            t}        40 MG           

 

     Benzonatate Benzonatate           No                       Benzonatat      

     



     100  MG                                    e 100 MG           

 

     Magnesium Magnesium           No                       Magnesium           

 

     Quinapril Quinapril           No                       Quinapril           



     HCl  HCl                                     HCl            

 

     amLODIPine amLODIPine           No             1{table      amLODIPine     

      



     Besylate 5 Besylate 5                          t}        Besylate 5        

   



     MG   MG                                      MG             

 

     Ondansetron Ondansetron           No                       Ondansetro      

     



     HCl  HCl                                     n HCl           

 

     hydroCHLORO hydroCHLORO           No             1{table QD   hydroCHLOR   

        



     thiazide 25 thiazide 25                          t_in_th      Othiazide    

       



     MG   MG                            e_morni      25 MG           



                                        ng}                      

 

     Metoprolol Metoprolol           No             1{table      Metoprolol     

      



     Succinate Succinate                          t}        Succinate           



      MG  MG                                     MG           

 

     Zithromax Zithromax           No                  QD   Zithromax           



     Z-Bartolome 250 Z-Bartolome 250                                    Z-Bartolome 250           



     MG   MG                                      MG             

 

     Azithromyci Azithromyci           No                  QD   Azithromyc      

     



     n 250 MG n 250 MG                                    in 250 MG           

 

     Fish Oil Fish Oil           No             1{capsu QD   Fish Oil           



     1000 MG 1000 MG                          le}       1000 MG           

 

     hydroCHLORO hydroCHLORO           No             1{table QD   hydroCHLOR   

        



     thiazide 25 thiazide 25                          t_in_th      Othiazide    

       



     MG   MG                            e_morni      25 MG           



                                        ng}                      

 

     Benzonatate Benzonatate           No                       Benzonatat      

     



                                                  e              

 

     hydroCHLORO hydroCHLORO           No             1{table QD   hydroCHLOR   

        



     thiazide 25 thiazide 25                          t_in_th      Othiazide    

       



     MG   MG                            e_morni      25 MG           



                                        ng}                      

 

     Dicyclomine Dicyclomine           No                       Dicyclomin      

     



     HCl  HCl                                     e HCl           

 

     Flonase 50 Flonase 50           No             1{spray QD   Flonase 50     

      



     MCG/ACT MCG/ACT                          _in_eac      MCG/ACT           



                                        h_nostr                     



                                        il}                      

 

     Benzonatate Benzonatate           No                       Benzonatat      

     



     100  MG                                    e 100 MG           

 

     Albuterol Albuterol           No             2{puff_ 6xD  Albuterol        

   



     Sulfate HFA Sulfate HFA                          as_need      Sulfate      

     



     108 (90 108 (90                          ed}                  



     Base) Base)                                    (90 Base)           



     MCG/ACT MCG/ACT                                    MCG/ACT           

 

     Benzonatate Benzonatate           No             1{capsu TID  Benzonatat   

        



     200  MG                          le}       e 200 MG           

 

     predniSONE predniSONE           No                       predniSONE        

   

 

     Quinapril Quinapril           No                       Quinapril           



     HCl 40 MG HCl 40 MG                                    HCl 40 MG           

 

     amLODIPine amLODIPine           No             1{table      amLODIPine     

      



     Besylate 5 Besylate 5                          t}        Besylate 5        

   



     MG   MG                                      MG             

 

     Accupril 40 Accupril 40           No             1{table QD   Accupril     

      



     MG   MG                            t}        40 MG           

 

     Quinapril Quinapril           No                       Quinapril           



     HCl 40 MG HCl 40 MG                                    HCl 40 MG           

 

     Flonase 50 Flonase 50           No             1{spray QD   Flonase 50     

      



     MCG/ACT MCG/ACT                          _in_eac      MCG/ACT           



                                        h_nostr                     



                                        il}                      

 

     Metoprolol Metoprolol           No             1{table      Metoprolol     

      



     Succinate Succinate                          t}        Succinate           



      MG  MG                                     MG           

 

     Ondansetron Ondansetron           No                       Ondansetro      

     



     HCl  HCl                                     n HCl           

 

     Dicyclomine Dicyclomine           No                       Dicyclomin      

     



     HCl  HCl                                     e HCl           

 

     Boostrix Boostrix           No                       Boostrix           

 

     Benzonatate Benzonatate           No                       Benzonatat      

     



                                                  e              

 

     Fish Oil Fish Oil           No             1{capsu QD   Fish Oil           



     1000 MG 1000 MG                          le}       1000 MG           

 

     Quinapril Quinapril           No                       Quinapril           



     HCl  HCl                                     HCl            

 

     hydroCHLORO hydroCHLORO           No             1{table QD   hydroCHLOR   

        



     thiazide 25 thiazide 25                          t_in_th      Othiazide    

       



     MG   MG                            e_morni      25 MG           



                                        ng}                      

 

     Benzonatate Benzonatate           No                       Benzonatat      

     



     100  MG                                    e 100 MG           

 

     hydroCHLORO hydroCHLORO           No             1{table QD   hydroCHLOR   

        



     thiazide 25 thiazide 25                          t_in_th      Othiazide    

       



     MG   MG                            e_morni      25 MG           



                                        ng}                      

 

     predniSONE predniSONE           No                       predniSONE        

   

 

     amLODIPine amLODIPine           No             1{table      amLODIPine     

      



     Besylate 5 Besylate 5                          t}        Besylate 5        

   



     MG   MG                                      MG             

 

     Accupril 40 Accupril 40           No             1{table QD   Accupril     

      



     MG   MG                            t}        40 MG           

 

     predniSONE predniSONE           No                       predniSONE        

   

 

     Boostrix Boostrix           No                       Boostrix           

 

     Benzonatate Benzonatate           No                       Benzonatat      

     



     100  MG                                    e 100 MG           

 

     Accupril 40 Accupril 40           No             1{table QD   Accupril     

      



     MG   MG                            t}        40 MG           

 

     hydroCHLORO hydroCHLORO           No             1{table QD   hydroCHLOR   

        



     thiazide 25 thiazide 25                          t_in_th      Othiazide    

       



     MG   MG                            e_morni      25 MG           



                                        ng}                      

 

     Quinapril Quinapril           No                       Quinapril           



     HCl 40 MG HCl 40 MG                                    HCl 40 MG           

 

     Benzonatate Benzonatate           No                       Benzonatat      

     



                                                  e              

 

     hydroCHLORO hydroCHLORO           No             1{table QD   hydroCHLOR   

        



     thiazide 25 thiazide 25                          t_in_th      Othiazide    

       



     MG   MG                            e_morni      25 MG           



                                        ng}                      

 

     amLODIPine amLODIPine           No             1{table      amLODIPine     

      



     Besylate 5 Besylate 5                          t}        Besylate 5        

   



     MG   MG                                      MG             

 

     Fish Oil Fish Oil           No             1{capsu QD   Fish Oil           



     1000 MG 1000 MG                          le}       1000 MG           

 

     Ondansetron Ondansetron           No                       Ondansetro      

     



     HCl  HCl                                     n HCl           

 

     Dicyclomine Dicyclomine           No                       Dicyclomin      

     



     HCl  HCl                                     e HCl           

 

     Metoprolol Metoprolol           No             1{table      Metoprolol     

      



     Succinate Succinate                          t}        Succinate           



      MG  MG                                     MG           

 

     Quinapril Quinapril           No                       Quinapril           



     HCl  HCl                                     HCl            

 

     Flonase 50 Flonase 50           No             1{spray QD   Flonase 50     

      



     MCG/ACT MCG/ACT                          _in_eac      MCG/ACT           



                                        h_nostr                     



                                        il}                      

 

     predniSONE predniSONE           No                       predniSONE        

   

 

     Boostrix Boostrix           No                       Boostrix           

 

     Benzonatate Benzonatate           No                       Benzonatat      

     



     100  MG                                    e 100 MG           

 

     Accupril 40 Accupril 40           No             1{table QD   Accupril     

      



     MG   MG                            t}        40 MG           

 

     hydroCHLORO hydroCHLORO           No             1{table QD   hydroCHLOR   

        



     thiazide 25 thiazide 25                          t_in_th      Othiazide    

       



     MG   MG                            e_morni      25 MG           



                                        ng}                      

 

     Quinapril Quinapril           No                       Quinapril           



     HCl 40 MG HCl 40 MG                                    HCl 40 MG           

 

     Benzonatate Benzonatate           No                       Benzonatat      

     



                                                  e              

 

     hydroCHLORO hydroCHLORO           No             1{table QD   hydroCHLOR   

        



     thiazide 25 thiazide 25                          t_in_th      Othiazide    

       



     MG   MG                            e_morni      25 MG           



                                        ng}                      

 

     amLODIPine amLODIPine           No             1{table      amLODIPine     

      



     Besylate 5 Besylate 5                          t}        Besylate 5        

   



     MG   MG                                      MG             

 

     Fish Oil Fish Oil           No             1{capsu QD   Fish Oil           



     1000 MG 1000 MG                          le}       1000 MG           

 

     Ondansetron Ondansetron           No                       Ondansetro      

     



     HCl  HCl                                     n HCl           

 

     Dicyclomine Dicyclomine           No                       Dicyclomin      

     



     HCl  HCl                                     e HCl           

 

     Metoprolol Metoprolol           No             1{table      Metoprolol     

      



     Succinate Succinate                          t}        Succinate           



      MG  MG                                     MG           

 

     Quinapril Quinapril           No                       Quinapril           



     HCl  HCl                                     HCl            

 

     Flonase 50 Flonase 50           No             1{spray QD   Flonase 50     

      



     MCG/ACT MCG/ACT                          _in_eac      MCG/ACT           



                                        h_nostr                     



                                        il}                      

 

     predniSONE predniSONE           No                       predniSONE        

   

 

     Boostrix Boostrix           No                       Boostrix           

 

     Benzonatate Benzonatate           No                       Benzonatat      

     



     100  MG                                    e 100 MG           

 

     Accupril 40 Accupril 40           No             1{table QD   Accupril     

      



     MG   MG                            t}        40 MG           

 

     hydroCHLORO hydroCHLORO           No             1{table QD   hydroCHLOR   

        



     thiazide 25 thiazide 25                          t_in_th      Othiazide    

       



     MG   MG                            e_morni      25 MG           



                                        ng}                      

 

     Quinapril Quinapril           No                       Quinapril           



     HCl 40 MG HCl 40 MG                                    HCl 40 MG           

 

     Benzonatate Benzonatate           No                       Benzonatat      

     



                                                  e              

 

     hydroCHLORO hydroCHLORO           No             1{table QD   hydroCHLOR   

        



     thiazide 25 thiazide 25                          t_in_th      Othiazide    

       



     MG   MG                            e_morni      25 MG           



                                        ng}                      

 

     amLODIPine amLODIPine           No             1{table      amLODIPine     

      



     Besylate 5 Besylate 5                          t}        Besylate 5        

   



     MG   MG                                      MG             

 

     Fish Oil Fish Oil           No             1{capsu QD   Fish Oil           



     1000 MG 1000 MG                          le}       1000 MG           

 

     Ondansetron Ondansetron           No                       Ondansetro      

     



     HCl  HCl                                     n HCl           

 

     Dicyclomine Dicyclomine           No                       Dicyclomin      

     



     HCl  HCl                                     e HCl           

 

     Metoprolol Metoprolol           No             1{table      Metoprolol     

      



     Succinate Succinate                          t}        Succinate           



      MG  MG                                     MG           

 

     Quinapril Quinapril           No                       Quinapril           



     HCl  HCl                                     HCl            

 

     Flonase 50 Flonase 50           No             1{spray QD   Flonase 50     

      



     MCG/ACT MCG/ACT                          _in_eac      MCG/ACT           



                                        h_nostr                     



                                        il}                      

 

     Ondansetron Ondansetron           No                       Ondansetro      

     



     HCl  HCl                                     n HCl           

 

     Dicyclomine Dicyclomine           No                       Dicyclomin      

     



     HCl  HCl                                     e HCl           

 

     Metoprolol Metoprolol           No             1{table      Metoprolol     

      



     Succinate Succinate                          t}        Succinate           



      MG  MG                                     MG           

 

     Flonase 50 Flonase 50           No             1{spray QD   Flonase 50     

      



     MCG/ACT MCG/ACT                          _in_eac      MCG/ACT           



                                        h_nostr                     



                                        il}                      

 

     predniSONE predniSONE           No                       predniSONE        

   

 

     Fish Oil Fish Oil           No             1{capsu QD   Fish Oil           



     1000 MG 1000 MG                          le}       1000 MG           

 

     Boostrix Boostrix           No                       Boostrix           

 

     Accupril 40 Accupril 40           No             1{table QD   Accupril     

      



     MG   MG                            t}        40 MG           

 

     Quinapril Quinapril           No                       Quinapril           



     HCl 40 MG HCl 40 MG                                    HCl 40 MG           

 

     Benzonatate Benzonatate           No                       Benzonatat      

     



     100  MG                                    e 100 MG           

 

     hydroCHLORO hydroCHLORO           No             1{table QD   hydroCHLOR   

        



     thiazide 25 thiazide 25                          t_in_th      Othiazide    

       



     MG   MG                            e_morni      25 MG           



                                        ng}                      

 

     Quinapril Quinapril           No                       Quinapril           



     HCl  HCl                                     HCl            

 

     hydroCHLORO hydroCHLORO           No             1{table QD   hydroCHLOR   

        



     thiazide 25 thiazide 25                          t_in_th      Othiazide    

       



     MG   MG                            e_morni      25 MG           



                                        ng}                      

 

     Zithromax Zithromax           No                  QD   Zithromax           



     Z-Bartolome 250 Z-Bartolome 250                                    Z-Bartolome 250           



     MG   MG                                      MG             

 

     amLODIPine amLODIPine           No             1{table      amLODIPine     

      



     Besylate 5 Besylate 5                          t}        Besylate 5        

   



     MG   MG                                      MG             

 

     Benzonatate Benzonatate           No                       Benzonatat      

     



                                                  e              

 

     Ondansetron Ondansetron           No                       Ondansetro      

     



     HCl  HCl                                     n HCl           

 

     Dicyclomine Dicyclomine           No                       Dicyclomin      

     



     HCl  HCl                                     e HCl           

 

     Metoprolol Metoprolol           No             1{table      Metoprolol     

      



     Succinate Succinate                          t}        Succinate           



      MG  MG                                     MG           

 

     Flonase 50 Flonase 50           No             1{spray QD   Flonase 50     

      



     MCG/ACT MCG/ACT                          _in_eac      MCG/ACT           



                                        h_nostr                     



                                        il}                      

 

     predniSONE predniSONE           No                       predniSONE        

   

 

     Fish Oil Fish Oil           No             1{capsu QD   Fish Oil           



     1000 MG 1000 MG                          le}       1000 MG           

 

     Boostrix Boostrix           No                       Boostrix           

 

     Accupril 40 Accupril 40           No             1{table QD   Accupril     

      



     MG   MG                            t}        40 MG           

 

     Quinapril Quinapril           No                       Quinapril           



     HCl 40 MG HCl 40 MG                                    HCl 40 MG           

 

     Benzonatate Benzonatate           No                       Benzonatat      

     



     100  MG                                    e 100 MG           

 

     hydroCHLORO hydroCHLORO           No             1{table QD   hydroCHLOR   

        



     thiazide 25 thiazide 25                          t_in_th      Othiazide    

       



     MG   MG                            e_morni      25 MG           



                                        ng}                      

 

     Quinapril Quinapril           No                       Quinapril           



     HCl  HCl                                     HCl            

 

     hydroCHLORO hydroCHLORO           No             1{table QD   hydroCHLOR   

        



     thiazide 25 thiazide 25                          t_in_th      Othiazide    

       



     MG   MG                            e_morni      25 MG           



                                        ng}                      

 

     Zithromax Zithromax           No                  QD   Zithromax           



     Z-Bartolome 250 Z-Bartolome 250                                    Z-Bartolome 250           



     MG   MG                                      MG             

 

     amLODIPine amLODIPine           No             1{table      amLODIPine     

      



     Besylate 5 Besylate 5                          t}        Besylate 5        

   



     MG   MG                                      MG             

 

     Benzonatate Benzonatate           No                       Benzonatat      

     



                                                  e              

 

     Ondansetron Ondansetron           No                       Ondansetro      

     



     HCl  HCl                                     n HCl           

 

     Dicyclomine Dicyclomine           No                       Dicyclomin      

     



     HCl  HCl                                     e HCl           

 

     Metoprolol Metoprolol           No             1{table      Metoprolol     

      



     Succinate Succinate                          t}        Succinate           



      MG  MG                                     MG           

 

     Flonase 50 Flonase 50           No             1{spray QD   Flonase 50     

      



     MCG/ACT MCG/ACT                          _in_eac      MCG/ACT           



                                        h_nostr                     



                                        il}                      

 

     predniSONE predniSONE           No                       predniSONE        

   

 

     Fish Oil Fish Oil           No             1{capsu QD   Fish Oil           



     1000 MG 1000 MG                          le}       1000 MG           

 

     Boostrix Boostrix           No                       Boostrix           

 

     Accupril 40 Accupril 40           No             1{table QD   Accupril     

      



     MG   MG                            t}        40 MG           

 

     Quinapril Quinapril           No                       Quinapril           



     HCl 40 MG HCl 40 MG                                    HCl 40 MG           

 

     Benzonatate Benzonatate           No                       Benzonatat      

     



     100  MG                                    e 100 MG           

 

     hydroCHLORO hydroCHLORO           No             1{table QD   hydroCHLOR   

        



     thiazide 25 thiazide 25                          t_in_th      Othiazide    

       



     MG   MG                            e_morni      25 MG           



                                        ng}                      

 

     Quinapril Quinapril           No                       Quinapril           



     HCl  HCl                                     HCl            

 

     hydroCHLORO hydroCHLORO           No             1{table QD   hydroCHLOR   

        



     thiazide 25 thiazide 25                          t_in_th      Othiazide    

       



     MG   MG                            e_morni      25 MG           



                                        ng}                      

 

     Zithromax Zithromax           No                  QD   Zithromax           



     Z-Bartolome 250 Z-Bartolome 250                                    Z-Bartolome 250           



     MG   MG                                      MG             

 

     amLODIPine amLODIPine           No             1{table      amLODIPine     

      



     Besylate 5 Besylate 5                          t}        Besylate 5        

   



     MG   MG                                      MG             

 

     Benzonatate Benzonatate           No                       Benzonatat      

     



                                                  e              

 

     Ondansetron Ondansetron           No                       Ondansetro      

     



     HCl  HCl                                     n HCl           

 

     Dicyclomine Dicyclomine           No                       Dicyclomin      

     



     HCl  HCl                                     e HCl           

 

     Metoprolol Metoprolol           No             1{table      Metoprolol     

      



     Succinate Succinate                          t}        Succinate           



      MG  MG                                     MG           

 

     Flonase 50 Flonase 50           No             1{spray QD   Flonase 50     

      



     MCG/ACT MCG/ACT                          _in_eac      MCG/ACT           



                                        h_nostr                     



                                        il}                      

 

     predniSONE predniSONE           No                       predniSONE        

   

 

     Fish Oil Fish Oil           No             1{capsu QD   Fish Oil           



     1000 MG 1000 MG                          le}       1000 MG           

 

     Boostrix Boostrix           No                       Boostrix           

 

     Accupril 40 Accupril 40           No             1{table QD   Accupril     

      



     MG   MG                            t}        40 MG           

 

     Quinapril Quinapril           No                       Quinapril           



     HCl 40 MG HCl 40 MG                                    HCl 40 MG           

 

     Benzonatate Benzonatate           No                       Benzonatat      

     



     100  MG                                    e 100 MG           

 

     hydroCHLORO hydroCHLORO           No             1{table QD   hydroCHLOR   

        



     thiazide 25 thiazide 25                          t_in_th      Othiazide    

       



     MG   MG                            e_morni      25 MG           



                                        ng}                      

 

     Quinapril Quinapril           No                       Quinapril           



     HCl  HCl                                     HCl            

 

     hydroCHLORO hydroCHLORO           No             1{table QD   hydroCHLOR   

        



     thiazide 25 thiazide 25                          t_in_th      Othiazide    

       



     MG   MG                            e_morni      25 MG           



                                        ng}                      

 

     Zithromax Zithromax           No                  QD   Zithromax           



     Z-Bartolome 250 Z-Bartolome 250                                    Z-Bartolome 250           



     MG   MG                                      MG             

 

     amLODIPine amLODIPine           No             1{table      amLODIPine     

      



     Besylate 5 Besylate 5                          t}        Besylate 5        

   



     MG   MG                                      MG             

 

     Benzonatate Benzonatate           No                       Benzonatat      

     



                                                  e              

 

     Boostrix Boostrix           No                       Boostrix           

 

     Ondansetron Ondansetron           No                       Ondansetro      

     



     HCl  HCl                                     n HCl           

 

     hydroCHLORO hydroCHLORO           No             1{table QD   hydroCHLOR   

        



     thiazide 25 thiazide 25                          t_in_th      Othiazide    

       



     MG   MG                            e_morni      25 MG           



                                        ng}                      

 

     Metoprolol Metoprolol           No             1{table      Metoprolol     

      



     Succinate Succinate                          t}        Succinate           



      MG  MG                                     MG           

 

     Fish Oil Fish Oil           No             1{capsu QD   Fish Oil           



     1000 MG 1000 MG                          le}       1000 MG           

 

     Flonase 50 Flonase 50           No             1{spray QD   Flonase 50     

      



     MCG/ACT MCG/ACT                          _in_eac      MCG/ACT           



                                        h_nostr                     



                                        il}                      

 

     Zithromax Zithromax           No                  QD   Zithromax           



     Z-Bartolome 250 Z-Bartolome 250                                    Z-Bartolome 250           



     MG   MG                                      MG             

 

     amLODIPine amLODIPine           No             1{table      amLODIPine     

      



     Besylate 5 Besylate 5                          t}        Besylate 5        

   



     MG   MG                                      MG             

 

     Quinapril Quinapril           No                       Quinapril           



     HCl  HCl                                     HCl            

 

     Benzonatate Benzonatate           No                       Benzonatat      

     



                                                  e              

 

     predniSONE predniSONE           No                       predniSONE        

   

 

     Dicyclomine Dicyclomine           No                       Dicyclomin      

     



     HCl  HCl                                     e HCl           

 

     Quinapril Quinapril           No                       Quinapril           



     HCl 40 MG HCl 40 MG                                    HCl 40 MG           

 

     Benzonatate Benzonatate           No                       Benzonatat      

     



     100  MG                                    e 100 MG           

 

     Accupril 40 Accupril 40           No             1{table QD   Accupril     

      



     MG   MG                            t}        40 MG           

 

     hydroCHLORO hydroCHLORO           No             1{table QD   hydroCHLOR   

        



     thiazide 25 thiazide 25                          t_in_th      Othiazide    

       



     MG   MG                            e_morni      25 MG           



                                        ng}                      

 

     Boostrix Boostrix           No                       Boostrix           

 

     Ondansetron Ondansetron           No                       Ondansetro      

     



     HCl  HCl                                     n HCl           

 

     hydroCHLORO hydroCHLORO           No             1{table QD   hydroCHLOR   

        



     thiazide 25 thiazide 25                          t_in_th      Othiazide    

       



     MG   MG                            e_morni      25 MG           



                                        ng}                      

 

     Metoprolol Metoprolol           No             1{table      Metoprolol     

      



     Succinate Succinate                          t}        Succinate           



      MG  MG                                     MG           

 

     Fish Oil Fish Oil           No             1{capsu QD   Fish Oil           



     1000 MG 1000 MG                          le}       1000 MG           

 

     Flonase 50 Flonase 50           No             1{spray QD   Flonase 50     

      



     MCG/ACT MCG/ACT                          _in_eac      MCG/ACT           



                                        h_nostr                     



                                        il}                      

 

     Zithromax Zithromax           No                  QD   Zithromax           



     Z-Bartolome 250 Z-Bartolome 250                                    Z-Bartolome 250           



     MG   MG                                      MG             

 

     amLODIPine amLODIPine           No             1{table      amLODIPine     

      



     Besylate 5 Besylate 5                          t}        Besylate 5        

   



     MG   MG                                      MG             

 

     Quinapril Quinapril           No                       Quinapril           



     HCl  HCl                                     HCl            

 

     Benzonatate Benzonatate           No                       Benzonatat      

     



                                                  e              

 

     predniSONE predniSONE           No                       predniSONE        

   

 

     Dicyclomine Dicyclomine           No                       Dicyclomin      

     



     HCl  HCl                                     e HCl           

 

     Quinapril Quinapril           No                       Quinapril           



     HCl 40 MG HCl 40 MG                                    HCl 40 MG           

 

     Benzonatate Benzonatate           No                       Benzonatat      

     



     100  MG                                    e 100 MG           

 

     Accupril 40 Accupril 40           No             1{table QD   Accupril     

      



     MG   MG                            t}        40 MG           

 

     hydroCHLORO hydroCHLORO           No             1{table QD   hydroCHLOR   

        



     thiazide 25 thiazide 25                          t_in_th      Othiazide    

       



     MG   MG                            e_morni      25 MG           



                                        ng}                      

 

     Boostrix Boostrix           No                       Boostrix           

 

     Ondansetron Ondansetron           No                       Ondansetro      

     



     HCl  HCl                                     n HCl           

 

     hydroCHLORO hydroCHLORO           No             1{table QD   hydroCHLOR   

        



     thiazide 25 thiazide 25                          t_in_th      Othiazide    

       



     MG   MG                            e_morni      25 MG           



                                        ng}                      

 

     Metoprolol Metoprolol           No             1{table      Metoprolol     

      



     Succinate Succinate                          t}        Succinate           



      MG  MG                                     MG           

 

     Fish Oil Fish Oil           No             1{capsu QD   Fish Oil           



     1000 MG 1000 MG                          le}       1000 MG           

 

     Flonase 50 Flonase 50           No             1{spray QD   Flonase 50     

      



     MCG/ACT MCG/ACT                          _in_eac      MCG/ACT           



                                        h_nostr                     



                                        il}                      

 

     Zithromax Zithromax           No                  QD   Zithromax           



     Z-Bartolome 250 Z-Bartolome 250                                    Z-Bartolome 250           



     MG   MG                                      MG             

 

     amLODIPine amLODIPine           No             1{table      amLODIPine     

      



     Besylate 5 Besylate 5                          t}        Besylate 5        

   



     MG   MG                                      MG             

 

     Quinapril Quinapril           No                       Quinapril           



     HCl  HCl                                     HCl            

 

     Benzonatate Benzonatate           No                       Benzonatat      

     



                                                  e              

 

     predniSONE predniSONE           No                       predniSONE        

   

 

     Dicyclomine Dicyclomine           No                       Dicyclomin      

     



     HCl  HCl                                     e HCl           

 

     Quinapril Quinapril           No                       Quinapril           



     HCl 40 MG HCl 40 MG                                    HCl 40 MG           

 

     Benzonatate Benzonatate           No                       Benzonatat      

     



     100  MG                                    e 100 MG           

 

     Accupril 40 Accupril 40           No             1{table QD   Accupril     

      



     MG   MG                            t}        40 MG           

 

     hydroCHLORO hydroCHLORO           No             1{table QD   hydroCHLOR   

        



     thiazide 25 thiazide 25                          t_in_th      Othiazide    

       



     MG   MG                            e_morni      25 MG           



                                        ng}                      

 

     hydroCHLORO hydroCHLORO           No             1{table QD   hydroCHLOR   

        



     thiazide 25 thiazide 25                          t_in_th      Othiazide    

       



     MG   MG                            e_morni      25 MG           



                                        ng}                      

 

     Benzonatate Benzonatate           No                       Benzonatat      

     



     100  MG                                    e 100 MG           

 

     Benzonatate Benzonatate           No                       Benzonatat      

     



                                                  e              

 

     Ondansetron Ondansetron           No                       Ondansetro      

     



     HCl  HCl                                     n HCl           

 

     Metoprolol Metoprolol           No             1{table      Metoprolol     

      



     Succinate Succinate                          t}        Succinate           



      MG  MG                                     MG           

 

     predniSONE predniSONE           No                       predniSONE        

   

 

     hydroCHLORO hydroCHLORO           No             1{table QD   hydroCHLOR   

        



     thiazide 25 thiazide 25                          t_in_th      Othiazide    

       



     MG   MG                            e_morni      25 MG           



                                        ng}                      

 

     Dicyclomine Dicyclomine           No                       Dicyclomin      

     



     HCl  HCl                                     e HCl           

 

     Quinapril Quinapril           No                       Quinapril           



     HCl 40 MG HCl 40 MG                                    HCl 40 MG           

 

     Boostrix Boostrix           No                       Boostrix           

 

     Quinapril Quinapril           No                       Quinapril           



     HCl  HCl                                     HCl            

 

     Zithromax Zithromax           No                  QD   Zithromax           



     Z-Bartolome 250 Z-Bartolome 250                                    Z-Bartolome 250           



     MG   MG                                      MG             

 

     Fish Oil Fish Oil           No             1{capsu QD   Fish Oil           



     1000 MG 1000 MG                          le}       1000 MG           

 

     amLODIPine amLODIPine           No             1{table      amLODIPine     

      



     Besylate 5 Besylate 5                          t}        Besylate 5        

   



     MG   MG                                      MG             

 

     Accupril 40 Accupril 40           No             1{table QD   Accupril     

      



     MG   MG                            t}        40 MG           

 

     Flonase 50 Flonase 50           No             1{spray QD   Flonase 50     

      



     MCG/ACT MCG/ACT                          _in_eac      MCG/ACT           



                                        h_nostr                     



                                        il}                      

 

     hydroCHLORO hydroCHLORO           No             1{table QD   hydroCHLOR   

        



     thiazide 25 thiazide 25                          t_in_th      Othiazide    

       



     MG   MG                            e_morni      25 MG           



                                        ng}                      

 

     Benzonatate Benzonatate           No                       Benzonatat      

     



     100  MG                                    e 100 MG           

 

     Benzonatate Benzonatate           No                       Benzonatat      

     



                                                  e              

 

     Ondansetron Ondansetron           No                       Ondansetro      

     



     HCl  HCl                                     n HCl           

 

     Metoprolol Metoprolol           No             1{table      Metoprolol     

      



     Succinate Succinate                          t}        Succinate           



      MG  MG                                     MG           

 

     predniSONE predniSONE           No                       predniSONE        

   

 

     hydroCHLORO hydroCHLORO           No             1{table QD   hydroCHLOR   

        



     thiazide 25 thiazide 25                          t_in_th      Othiazide    

       



     MG   MG                            e_morni      25 MG           



                                        ng}                      

 

     Dicyclomine Dicyclomine           No                       Dicyclomin      

     



     HCl  HCl                                     e HCl           

 

     Quinapril Quinapril           No                       Quinapril           



     HCl 40 MG HCl 40 MG                                    HCl 40 MG           

 

     Boostrix Boostrix           No                       Boostrix           

 

     Quinapril Quinapril           No                       Quinapril           



     HCl  HCl                                     HCl            

 

     Zithromax Zithromax           No                  QD   Zithromax           



     Z-Bartolome 250 Z-Bartolome 250                                    Z-Bartolome 250           



     MG   MG                                      MG             

 

     Fish Oil Fish Oil           No             1{capsu QD   Fish Oil           



     1000 MG 1000 MG                          le}       1000 MG           

 

     amLODIPine amLODIPine           No             1{table      amLODIPine     

      



     Besylate 5 Besylate 5                          t}        Besylate 5        

   



     MG   MG                                      MG             

 

     Accupril 40 Accupril 40           No             1{table QD   Accupril     

      



     MG   MG                            t}        40 MG           

 

     Flonase 50 Flonase 50           No             1{spray QD   Flonase 50     

      



     MCG/ACT MCG/ACT                          _in_eac      MCG/ACT           



                                        h_nostr                     



                                        il}                      

 

     hydroCHLORO hydroCHLORO           No             1{table QD   hydroCHLOR   

        



     thiazide 25 thiazide 25                          t_in_th      Othiazide    

       



     MG   MG                            e_morni      25 MG           



                                        ng}                      

 

     Benzonatate Benzonatate           No                       Benzonatat      

     



     100  MG                                    e 100 MG           

 

     Benzonatate Benzonatate           No                       Benzonatat      

     



                                                  e              

 

     Ondansetron Ondansetron           No                       Ondansetro      

     



     HCl  HCl                                     n HCl           

 

     Metoprolol Metoprolol           No             1{table      Metoprolol     

      



     Succinate Succinate                          t}        Succinate           



      MG  MG                                     MG           

 

     predniSONE predniSONE           No                       predniSONE        

   

 

     hydroCHLORO hydroCHLORO           No             1{table QD   hydroCHLOR   

        



     thiazide 25 thiazide 25                          t_in_th      Othiazide    

       



     MG   MG                            e_morni      25 MG           



                                        ng}                      

 

     Dicyclomine Dicyclomine           No                       Dicyclomin      

     



     HCl  HCl                                     e HCl           

 

     Quinapril Quinapril           No                       Quinapril           



     HCl 40 MG HCl 40 MG                                    HCl 40 MG           

 

     Boostrix Boostrix           No                       Boostrix           

 

     Quinapril Quinapril           No                       Quinapril           



     HCl  HCl                                     HCl            

 

     Zithromax Zithromax           No                  QD   Zithromax           



     Z-Bartolome 250 Z-Bartolome 250                                    Z-Bartolome 250           



     MG   MG                                      MG             

 

     Fish Oil Fish Oil           No             1{capsu QD   Fish Oil           



     1000 MG 1000 MG                          le}       1000 MG           

 

     amLODIPine amLODIPine           No             1{table      amLODIPine     

      



     Besylate 5 Besylate 5                          t}        Besylate 5        

   



     MG   MG                                      MG             

 

     Accupril 40 Accupril 40           No             1{table QD   Accupril     

      



     MG   MG                            t}        40 MG           

 

     Flonase 50 Flonase 50           No             1{spray QD   Flonase 50     

      



     MCG/ACT MCG/ACT                          _in_eac      MCG/ACT           



                                        h_nostr                     



                                        il}                      

 

     hydroCHLORO hydroCHLORO           No             1{table QD   hydroCHLOR   

        



     thiazide 25 thiazide 25                          t_in_th      Othiazide    

       



     MG   MG                            e_morni      25 MG           



                                        ng}                      

 

     Benzonatate Benzonatate           No                       Benzonatat      

     



     100  MG                                    e 100 MG           

 

     Benzonatate Benzonatate           No                       Benzonatat      

     



                                                  e              

 

     Ondansetron Ondansetron           No                       Ondansetro      

     



     HCl  HCl                                     n HCl           

 

     Metoprolol Metoprolol           No             1{table      Metoprolol     

      



     Succinate Succinate                          t}        Succinate           



      MG  MG                                     MG           

 

     predniSONE predniSONE           No                       predniSONE        

   

 

     hydroCHLORO hydroCHLORO           No             1{table QD   hydroCHLOR   

        



     thiazide 25 thiazide 25                          t_in_th      Othiazide    

       



     MG   MG                            e_morni      25 MG           



                                        ng}                      

 

     Dicyclomine Dicyclomine           No                       Dicyclomin      

     



     HCl  HCl                                     e HCl           

 

     Quinapril Quinapril           No                       Quinapril           



     HCl 40 MG HCl 40 MG                                    HCl 40 MG           

 

     Boostrix Boostrix           No                       Boostrix           

 

     Quinapril Quinapril           No                       Quinapril           



     HCl  HCl                                     HCl            

 

     Zithromax Zithromax           No                  QD   Zithromax           



     Z-Bartolome 250 Z-Bartolome 250                                    Z-Bartolome 250           



     MG   MG                                      MG             

 

     Fish Oil Fish Oil           No             1{capsu QD   Fish Oil           



     1000 MG 1000 MG                          le}       1000 MG           

 

     amLODIPine amLODIPine           No             1{table      amLODIPine     

      



     Besylate 5 Besylate 5                          t}        Besylate 5        

   



     MG   MG                                      MG             

 

     Accupril 40 Accupril 40           No             1{table QD   Accupril     

      



     MG   MG                            t}        40 MG           

 

     Flonase 50 Flonase 50           No             1{spray QD   Flonase 50     

      



     MCG/ACT MCG/ACT                          _in_eac      MCG/ACT           



                                        h_nostr                     



                                        il}                      

 

     Benzonatate Benzonatate           No                       Benzonatat      

     



                                                  e              

 

     Quinapril Quinapril           No                       Quinapril           



     HCl 40 MG HCl 40 MG                                    HCl 40 MG           

 

     Dicyclomine Dicyclomine           No                       Dicyclomin      

     



     HCl  HCl                                     e HCl           

 

     Quinapril Quinapril           No                       Quinapril           



     HCl  HCl                                     HCl            

 

     hydroCHLORO hydroCHLORO           No             1{table QD   hydroCHLOR   

        



     thiazide 25 thiazide 25                          t_in_th      Othiazide    

       



     MG   MG                            e_morni      25 MG           



                                        ng}                      

 

     Accupril 40 Accupril 40           No             1{table QD   Accupril     

      



     MG   MG                            t}        40 MG           

 

     Flonase 50 Flonase 50           No             1{spray QD   Flonase 50     

      



     MCG/ACT MCG/ACT                          _in_eac      MCG/ACT           



                                        h_nostr                     



                                        il}                      

 

     Metoprolol Metoprolol           No             1{table      Metoprolol     

      



     Succinate Succinate                          t}        Succinate           



      MG  MG                                     MG           

 

     Fish Oil Fish Oil           No             1{capsu QD   Fish Oil           



     1000 MG 1000 MG                          le}       1000 MG           

 

     Benzonatate Benzonatate           No                       Benzonatat      

     



     100  MG                                    e 100 MG           

 

     Zithromax Zithromax           No                  QD   Zithromax           



     Z-Bartolome 250 Z-Bartolome 250                                    Z-Bartolome 250           



     MG   MG                                      MG             

 

     predniSONE predniSONE           No                       predniSONE        

   

 

     Ondansetron Ondansetron           No                       Ondansetro      

     



     HCl  HCl                                     n HCl           

 

     amLODIPine amLODIPine           No             1{table      amLODIPine     

      



     Besylate 5 Besylate 5                          t}        Besylate 5        

   



     MG   MG                                      MG             

 

     hydroCHLORO hydroCHLORO           No             1{table QD   hydroCHLOR   

        



     thiazide 25 thiazide 25                          t_in_th      Othiazide    

       



     MG   MG                            e_morni      25 MG           



                                        ng}                      

 

     Boostrix Boostrix           No                       Boostrix           

 

     Benzonatate Benzonatate           No                       Benzonatat      

     



                                                  e              

 

     Quinapril Quinapril           No                       Quinapril           



     HCl 40 MG HCl 40 MG                                    HCl 40 MG           

 

     Dicyclomine Dicyclomine           No                       Dicyclomin      

     



     HCl  HCl                                     e HCl           

 

     Quinapril Quinapril           No                       Quinapril           



     HCl  HCl                                     HCl            

 

     hydroCHLORO hydroCHLORO           No             1{table QD   hydroCHLOR   

        



     thiazide 25 thiazide 25                          t_in_th      Othiazide    

       



     MG   MG                            e_morni      25 MG           



                                        ng}                      

 

     Accupril 40 Accupril 40           No             1{table QD   Accupril     

      



     MG   MG                            t}        40 MG           

 

     Flonase 50 Flonase 50           No             1{spray QD   Flonase 50     

      



     MCG/ACT MCG/ACT                          _in_eac      MCG/ACT           



                                        h_nostr                     



                                        il}                      

 

     Metoprolol Metoprolol           No             1{table      Metoprolol     

      



     Succinate Succinate                          t}        Succinate           



      MG  MG                                     MG           

 

     Fish Oil Fish Oil           No             1{capsu QD   Fish Oil           



     1000 MG 1000 MG                          le}       1000 MG           

 

     Benzonatate Benzonatate           No                       Benzonatat      

     



     100  MG                                    e 100 MG           

 

     Zithromax Zithromax           No                  QD   Zithromax           



     Z-Bartolome 250 Z-Bartolome 250                                    Z-Bartolome 250           



     MG   MG                                      MG             

 

     predniSONE predniSONE           No                       predniSONE        

   

 

     Ondansetron Ondansetron           No                       Ondansetro      

     



     HCl  HCl                                     n HCl           

 

     amLODIPine amLODIPine           No             1{table      amLODIPine     

      



     Besylate 5 Besylate 5                          t}        Besylate 5        

   



     MG   MG                                      MG             

 

     hydroCHLORO hydroCHLORO           No             1{table QD   hydroCHLOR   

        



     thiazide 25 thiazide 25                          t_in_th      Othiazide    

       



     MG   MG                            e_morni      25 MG           



                                        ng}                      

 

     Boostrix Boostrix           No                       Boostrix           

 

     Quinapril Quinapril           No                       Quinapril           



     HCl  HCl                                     HCl            

 

     Ondansetron Ondansetron           No                       Ondansetro      

     



     HCl  HCl                                     n HCl           

 

     Benzonatate Benzonatate           No                       Benzonatat      

     



                                                  e              

 

     Fish Oil Fish Oil           No             1{capsu QD   Fish Oil           



     1000 MG 1000 MG                          le}       1000 MG           

 

     Quinapril Quinapril           No                       Quinapril           



     HCl 40 MG HCl 40 MG                                    HCl 40 MG           

 

     hydroCHLORO hydroCHLORO           No             1{table QD   hydroCHLOR   

        



     thiazide 25 thiazide 25                          t_in_th      Othiazide    

       



     MG   MG                            e_morni      25 MG           



                                        ng}                      

 

     amLODIPine amLODIPine           No                       amLODIPine        

   



     Besylate 5 Besylate 5                                    Besylate 5        

   



     MG   MG                                      MG             

 

     Boostrix Boostrix           No                       Boostrix           

 

     Metoprolol Metoprolol           No             1{table      Metoprolol     

      



     Succinate Succinate                          t}        Succinate           



      MG  MG                                     MG           

 

     Flonase 50 Flonase 50           No             1{spray QD   Flonase 50     

      



     MCG/ACT MCG/ACT                          _in_eac      MCG/ACT           



                                        h_nostr                     



                                        il}                      

 

     Benzonatate Benzonatate           No                       Benzonatat      

     



     100  MG                                    e 100 MG           

 

     Dicyclomine Dicyclomine           No                       Dicyclomin      

     



     HCl  HCl                                     e HCl           

 

     predniSONE predniSONE           No                       predniSONE        

   

 

     Zithromax Zithromax           No                  QD   Zithromax           



     Z-Bartolome 250 Z-Bartolome 250                                    Z-Bartolome 250           



     MG   MG                                      MG             

 

     hydroCHLORO hydroCHLORO           No             1{table QD   hydroCHLOR   

        



     thiazide 25 thiazide 25                          t_in_th      Othiazide    

       



     MG   MG                            e_morni      25 MG           



                                        ng}                      

 

     Quinapril Quinapril           No                       Quinapril           



     HCl 40 MG HCl 40 MG                                    HCl 40 MG           

 

     amLODIPine amLODIPine           No             1{table      amLODIPine     

      



     Besylate 5 Besylate 5                          t}        Besylate 5        

   



     MG   MG                                      MG             

 

     Boostrix Boostrix           No                       Boostrix           

 

     Metoprolol Metoprolol           No             1{table      Metoprolol     

      



     Succinate Succinate                          t}        Succinate           



      MG  MG                                     MG           

 

     Metoprolol Metoprolol           No                       Metoprolol        

   



     Succinate Succinate                                    Succinate           



      MG  MG                                     MG           

 

     Quinapril Quinapril           No                       Quinapril           



     HCl  HCl                                     HCl            

 

     Fish Oil Fish Oil           No             1{capsu QD   Fish Oil           



     1000 MG 1000 MG                          le}       1000 MG           

 

     hydroCHLORO hydroCHLORO           No             1{table QD   hydroCHLOR   

        



     thiazide 25 thiazide 25                          t_in_th      Othiazide    

       



     MG   MG                            e_morni      25 MG           



                                        ng}                      

 

     Dicyclomine Dicyclomine           No                       Dicyclomin      

     



     HCl  HCl                                     e HCl           

 

     amLODIPine amLODIPine           No                       amLODIPine        

   



     Besylate 5 Besylate 5                                    Besylate 5        

   



     MG   MG                                      MG             

 

     Accupril 40 Accupril 40           No             1{table QD   Accupril     

      



     MG   MG                            t}        40 MG           

 

     Benzonatate Benzonatate           No                       Benzonatat      

     



                                                  e              

 

     predniSONE predniSONE           No                       predniSONE        

   

 

     Flonase 50 Flonase 50           No             1{spray QD   Flonase 50     

      



     MCG/ACT MCG/ACT                          _in_eac      MCG/ACT           



                                        h_nostr                     



                                        il}                      

 

     Ondansetron Ondansetron           No                       Ondansetro      

     



     HCl  HCl                                     n HCl           

 

     hydroCHLORO hydroCHLORO           No             1{table QD   hydroCHLOR   

        



     thiazide 25 thiazide 25                          t_in_th      Othiazide    

       



     MG   MG                            e_morni      25 MG           



                                        ng}                      

 

     Benzonatate Benzonatate           No                       Benzonatat      

     



     100  MG                                    e 100 MG           

 

     Zithromax Zithromax           No                  QD   Zithromax           



     Z-Bartolome 250 Z-Bartolome 250                                    Z-Bartolome 250           



     MG   MG                                      MG             

 

     Quinapril Quinapril           No                       Quinapril           



     HCl 40 MG HCl 40 MG                                    HCl 40 MG           

 

     amLODIPine amLODIPine           No             1{table      amLODIPine     

      



     Besylate 5 Besylate 5                          t}        Besylate 5        

   



     MG   MG                                      MG             

 

     Boostrix Boostrix           No                       Boostrix           

 

     Metoprolol Metoprolol           No             1{table      Metoprolol     

      



     Succinate Succinate                          t}        Succinate           



      MG  MG                                     MG           

 

     Metoprolol Metoprolol           No                       Metoprolol        

   



     Succinate Succinate                                    Succinate           



      MG  MG                                     MG           

 

     Quinapril Quinapril           No                       Quinapril           



     HCl  HCl                                     HCl            

 

     Fish Oil Fish Oil           No             1{capsu QD   Fish Oil           



     1000 MG 1000 MG                          le}       1000 MG           

 

     hydroCHLORO hydroCHLORO           No             1{table QD   hydroCHLOR   

        



     thiazide 25 thiazide 25                          t_in_th      Othiazide    

       



     MG   MG                            e_morni      25 MG           



                                        ng}                      

 

     Dicyclomine Dicyclomine           No                       Dicyclomin      

     



     HCl  HCl                                     e HCl           

 

     amLODIPine amLODIPine           No                       amLODIPine        

   



     Besylate 5 Besylate 5                                    Besylate 5        

   



     MG   MG                                      MG             

 

     Accupril 40 Accupril 40           No             1{table QD   Accupril     

      



     MG   MG                            t}        40 MG           

 

     Benzonatate Benzonatate           No                       Benzonatat      

     



                                                  e              

 

     predniSONE predniSONE           No                       predniSONE        

   

 

     Flonase 50 Flonase 50           No             1{spray QD   Flonase 50     

      



     MCG/ACT MCG/ACT                          _in_eac      MCG/ACT           



                                        h_nostr                     



                                        il}                      

 

     Ondansetron Ondansetron           No                       Ondansetro      

     



     HCl  HCl                                     n HCl           

 

     hydroCHLORO hydroCHLORO           No             1{table QD   hydroCHLOR   

        



     thiazide 25 thiazide 25                          t_in_th      Othiazide    

       



     MG   MG                            e_morni      25 MG           



                                        ng}                      

 

     Benzonatate Benzonatate           No                       Benzonatat      

     



     100  MG                                    e 100 MG           

 

     Zithromax Zithromax           No                  QD   Zithromax           



     Z-Bartolome 250 Z-Bartolome 250                                    Z-Bartolome 250           



     MG   MG                                      MG             

 

     Quinapril Quinapril           No                       Quinapril           



     HCl 40 MG HCl 40 MG                                    HCl 40 MG           

 

     amLODIPine amLODIPine           No             1{table      amLODIPine     

      



     Besylate 5 Besylate 5                          t}        Besylate 5        

   



     MG   MG                                      MG             

 

     Boostrix Boostrix           No                       Boostrix           

 

     Metoprolol Metoprolol           No             1{table      Metoprolol     

      



     Succinate Succinate                          t}        Succinate           



      MG  MG                                     MG           

 

     Metoprolol Metoprolol           No                       Metoprolol        

   



     Succinate Succinate                                    Succinate           



      MG  MG                                     MG           

 

     Quinapril Quinapril           No                       Quinapril           



     HCl  HCl                                     HCl            

 

     Fish Oil Fish Oil           No             1{capsu QD   Fish Oil           



     1000 MG 1000 MG                          le}       1000 MG           

 

     hydroCHLORO hydroCHLORO           No             1{table QD   hydroCHLOR   

        



     thiazide 25 thiazide 25                          t_in_th      Othiazide    

       



     MG   MG                            e_morni      25 MG           



                                        ng}                      

 

     Dicyclomine Dicyclomine           No                       Dicyclomin      

     



     HCl  HCl                                     e HCl           

 

     amLODIPine amLODIPine           No                       amLODIPine        

   



     Besylate 5 Besylate 5                                    Besylate 5        

   



     MG   MG                                      MG             

 

     Accupril 40 Accupril 40           No             1{table QD   Accupril     

      



     MG   MG                            t}        40 MG           

 

     Benzonatate Benzonatate           No                       Benzonatat      

     



                                                  e              

 

     predniSONE predniSONE           No                       predniSONE        

   

 

     Flonase 50 Flonase 50           No             1{spray QD   Flonase 50     

      



     MCG/ACT MCG/ACT                          _in_eac      MCG/ACT           



                                        h_nostr                     



                                        il}                      

 

     Ondansetron Ondansetron           No                       Ondansetro      

     



     HCl  HCl                                     n HCl           

 

     hydroCHLORO hydroCHLORO           No             1{table QD   hydroCHLOR   

        



     thiazide 25 thiazide 25                          t_in_th      Othiazide    

       



     MG   MG                            e_morni      25 MG           



                                        ng}                      

 

     Benzonatate Benzonatate           No                       Benzonatat      

     



     100  MG                                    e 100 MG           

 

     Zithromax Zithromax           No                  QD   Zithromax           



     Z-Bartolome 250 Z-Bartolome 250                                    Z-Bartolome 250           



     MG   MG                                      MG             

 

     Quinapril Quinapril           No                       Quinapril           



     HCl 40 MG HCl 40 MG                                    HCl 40 MG           

 

     amLODIPine amLODIPine           No             1{table      amLODIPine     

      



     Besylate 5 Besylate 5                          t}        Besylate 5        

   



     MG   MG                                      MG             

 

     Boostrix Boostrix           No                       Boostrix           

 

     Metoprolol Metoprolol           No             1{table      Metoprolol     

      



     Succinate Succinate                          t}        Succinate           



      MG  MG                                     MG           

 

     Metoprolol Metoprolol           No                       Metoprolol        

   



     Succinate Succinate                                    Succinate           



      MG  MG                                     MG           

 

     Quinapril Quinapril           No                       Quinapril           



     HCl  HCl                                     HCl            

 

     Fish Oil Fish Oil           No             1{capsu QD   Fish Oil           



     1000 MG 1000 MG                          le}       1000 MG           

 

     hydroCHLORO hydroCHLORO           No             1{table QD   hydroCHLOR   

        



     thiazide 25 thiazide 25                          t_in_th      Othiazide    

       



     MG   MG                            e_morni      25 MG           



                                        ng}                      

 

     Dicyclomine Dicyclomine           No                       Dicyclomin      

     



     HCl  HCl                                     e HCl           

 

     amLODIPine amLODIPine           No                       amLODIPine        

   



     Besylate 5 Besylate 5                                    Besylate 5        

   



     MG   MG                                      MG             

 

     Accupril 40 Accupril 40           No             1{table QD   Accupril     

      



     MG   MG                            t}        40 MG           

 

     Benzonatate Benzonatate           No                       Benzonatat      

     



                                                  e              

 

     predniSONE predniSONE           No                       predniSONE        

   

 

     Flonase 50 Flonase 50           No             1{spray QD   Flonase 50     

      



     MCG/ACT MCG/ACT                          _in_eac      MCG/ACT           



                                        h_nostr                     



                                        il}                      

 

     Ondansetron Ondansetron           No                       Ondansetro      

     



     HCl  HCl                                     n HCl           

 

     hydroCHLORO hydroCHLORO           No             1{table QD   hydroCHLOR   

        



     thiazide 25 thiazide 25                          t_in_th      Othiazide    

       



     MG   MG                            e_morni      25 MG           



                                        ng}                      

 

     Benzonatate Benzonatate           No                       Benzonatat      

     



     100  MG                                    e 100 MG           

 

     Zithromax Zithromax           No                  QD   Zithromax           



     Z-Bartolome 250 Z-Bartolome 250                                    Z-Bartolome 250           



     MG   MG                                      MG             







Immunizations







           Ordered Immunization Filled Immunization Date       Status     Commen

ts   Source



           Name       Name                                        

 

           FLUZONE HIGH DOSE FLUZONE HIGH DOSE 2022 Completed             

[facility]



           OVER 65    OVER 65    13:47:00                         - [facility]

 

           FLUZONE HIGH DOSE FLUZONE HIGH DOSE 2022 Completed             

[facility]



           OVER 65    OVER 65    13:47:00                         - [facility]

 

           FLUZONE HIGH DOSE FLUZONE HIGH DOSE 2022 Completed             

[facility]



           OVER 65    OVER 65    13:47:00                         - [facility]

 

           FLUZONE HIGH DOSE FLUZONE HIGH DOSE 2022 Completed             

[facility]



           OVER 65    OVER 65    13:47:00                         - [facility]

 

           FLUZONE HIGH DOSE FLUZONE HIGH DOSE 2022 Completed             

[facility]



           OVER 65    OVER 65    13:47:00                         - [facility]

 

           FLUZONE HIGH DOSE FLUZONE HIGH DOSE 2022 Completed             

[facility]



           OVER 65    OVER 65    13:47:00                         - [facility]

 

           FLUZONE HIGH DOSE FLUZONE HIGH DOSE 2022 Completed             

[facility]



           OVER 65    OVER 65    13:47:00                         - [facility]

 

           FLUZONE HIGH DOSE FLUZONE HIGH DOSE 2022 Completed             

[facility]



           OVER 65    OVER 65    13:47:00                         - [facility]

 

           FLUZONE HIGH DOSE FLUZONE HIGH DOSE 2021 Completed             

[facility]



           OVER 65    OVER 65    15:17:00                         - [facility]

 

           FLUZONE HIGH DOSE FLUZONE HIGH DOSE 2021 Completed             

[facility]



           OVER 65    OVER 65    15:17:00                         [facility]

 

           FLUZONE HIGH DOSE FLUZONE HIGH DOSE 2021 Completed             

[facility]



           OVER 65    OVER 65    15:17:00                         - [facility]

 

           FLUZONE HIGH DOSE FLUZONE HIGH DOSE 2021 Completed             

[facility]



           OVER 65    OVER 65    15:17:00                         - [facility]

 

           FLUZONE HIGH DOSE FLUZONE HIGH DOSE 2021 Completed             

[facility]



           OVER 65    OVER 65    15:17:00                         - [facility]

 

           FLUZONE HIGH DOSE FLUZONE HIGH DOSE 2021 Completed             

[facility]



           OVER 65    OVER 65    15:17:00                         - [facility]

 

           FLUZONE HIGH DOSE FLUZONE HIGH DOSE 2021 Completed             

[facility]



           OVER 65    OVER 65    15:17:00                         - [facility]

 

           FLUZONE HIGH DOSE FLUZONE HIGH DOSE 2021 Completed             

[facility]



           OVER 65    OVER 65    15:17:00                         - [facility]

 

           FLUZONE HIGH DOSE FLUZONE HIGH DOSE 2021 Completed             

[facility]



           OVER 65    OVER 65    15:17:00                         - [facility]

 

           FLUZONE HIGH DOSE FLUZONE HIGH DOSE 2021 Completed             

[facility]



           OVER 65    OVER 65    15:17:00                         - [facility]

 

           FLUZONE HIGH DOSE FLUZONE HIGH DOSE 2021 Completed             

[facility]



           OVER 65    OVER 65    15:17:00                         - [facility]

 

           FLUZONE HIGH DOSE FLUZONE HIGH DOSE 2021 Completed             

[facility]



           OVER 65    OVER 65    15:17:00                         - [facility]

 

           FLUZONE HIGH DOSE FLUZONE HIGH DOSE 2021 Completed             

[facility]



           OVER 65    OVER 65    15:17:00                         - [facility]

 

           FLUZONE HIGH DOSE FLUZONE HIGH DOSE 2021 Completed             

[facility]



           OVER 65    OVER 65    15:17:00                         - [facility]

 

           FLUZONE HIGH DOSE FLUZONE HIGH DOSE 2021 Completed             

[facility]



           OVER 65    OVER 65    15:17:00                         - [facility]

 

           FLUZONE HIGH DOSE FLUZONE HIGH DOSE 2021 Completed             

[facility]



           OVER 65    OVER 65    15:17:00                         - [facility]

 

           FLUZONE HIGH DOSE FLUZONE HIGH DOSE 2021 Completed             

[facility]



           OVER 65    OVER 65    15:17:00                         - [facility]

 

           FLUZONE HIGH DOSE FLUZONE HIGH DOSE 2021 Completed             

[facility]



           OVER 65    OVER 65    15:17:00                         - [facility]

 

           FLUZONE HIGH DOSE FLUZONE HIGH DOSE 2021 Completed             

[facility]



           OVER 65    OVER 65    15:17:00                         - [facility]

 

           FLUZONE HIGH DOSE FLUZONE HIGH DOSE 2021 Completed             

[facility]



           OVER 65    OVER 65    15:17:00                         - [facility]

 

           FLUZONE HIGH DOSE FLUZONE HIGH DOSE 2021 Completed             

[facility]



           OVER 65    OVER 65    15:17:00                         - [facility]

 

           FLUZONE HIGH DOSE FLUZONE HIGH DOSE 2021 Completed             

[facility]



           OVER 65    OVER 65    15:17:00                         - [facility]

 

           FLUZONE HIGH DOSE FLUZONE HIGH DOSE 2021 Completed             

[facility]



           OVER 65    OVER 65    15:17:00                         - [facility]

 

           FLUZONE HIGH DOSE FLUZONE HIGH DOSE 2021 Completed             

[facility]



           OVER 65    OVER 65    15:17:00                         - [facility]

 

           FLUZONE HIGH DOSE FLUZONE HIGH DOSE 2021 Completed             

[facility]



           OVER 65    OVER 65    15:17:00                         - [facility]

 

           FLUZONE HIGH DOSE FLUZONE HIGH DOSE 2021 Completed             

[facility]



           OVER 65    OVER 65    15:17:00                         - [facility]

 

           FLUZONE HIGH DOSE FLUZONE HIGH DOSE 2021 Completed             

[facility]



           OVER 65    OVER 65    15:17:00                         - [facility]

 

           FLUZONE HIGH DOSE FLUZONE HIGH DOSE 2021 Completed             

[facility]



           OVER 65    OVER 65    15:17:00                         - [facility]

 

           FLUZONE HIGH DOSE FLUZONE HIGH DOSE 2021 Completed             

[facility]



           OVER 65    OVER 65    15:17:00                         - [facility]

 

           FLUZONE HIGH DOSE FLUZONE HIGH DOSE 2021 Completed             

[facility]



           OVER 65    OVER 65    15:17:00                         - [facility]

 

           FLUZONE HIGH DOSE FLUZONE HIGH DOSE 2021 Completed             

[facility]



           OVER 65    OVER 65    15:17:00                         - [facility]

 

           FLUZONE HIGH DOSE FLUZONE HIGH DOSE 2021 Completed             

[facility]



           OVER 65    OVER 65    15:17:00                         - [facility]

 

           FLUZONE HIGH DOSE FLUZONE HIGH DOSE 2021 Completed             

[facility]



           OVER 65    OVER 65    15:17:00                         - [facility]

 

           FLUZONE HIGH DOSE FLUZONE HIGH DOSE 2021 Completed             

[facility]



           OVER 65    OVER 65    15:17:00                         - [facility]

 

           FLUZONE HIGH DOSE FLUZONE HIGH DOSE 2021 Completed             

[facility]



           OVER 65    OVER 65    15:17:00                         - [facility]

 

           FLUZONE HIGH DOSE FLUZONE HIGH DOSE 2021 Completed             

[facility]



           OVER 65    OVER 65    15:17:00                         - [facility]

 

           Hyalgan 20 mg Hyalgan 20 mg 2020 Completed             [facility]



                                 08:01:00                         [facility]

 

           Hyalgan 20 mg Hyalgan 20 mg 2020 Completed             [facility]



                                 08:01:00                         [facility]

 

           Hyalgan 20 mg Hyalgan 20 mg 2020-12-10 Completed             [facility]



                                 09:26:00                         - [facility]

 

           Hyalgan 20 mg Hyalgan 20 mg 2020-12-10 Completed             [facility]



                                 09:25:00                         [facility]

 

           Bupivicaine Hydro Bupivicaine Hydro 2020 Completed             

[facility]



                                 09:45:00                         [facility]

 

           Bupivicaine Hydro Bupivicaine Hydro 2020 Completed             

[facility]



                                 09:45:00                         [facility]

 

           Kenalog    Kenalog    2020 Completed             [facility]



           (Triamcinolone) (Triamcinolone) 09:45:00                         [facility]

 

           Hyalgan 20 mg Hyalgan 20 mg 2020 Completed             [facility]



                                 09:44:00                         [facility]

 

           Hyalgan 20 mg Hyalgan 20 mg 2020 Completed             [facility]



                                 09:44:00                         [facility]

 

           Kenalog    Kenalog    2020 Completed             [facility]



           (Triamcinolone) (Triamcinolone) 09:44:00                         [facility]

 

           Fluzone    Fluzone    2020-09-10 Completed             [facility]



                                 16:49:00                         - [facility]

 

           Fluzone    Fluzone    2020-09-10 Completed             [facility]



                                 16:49:00                         - [facility]

 

           Fluzone    Fluzone    2020-09-10 Completed             [facility]



                                 16:49:00                         - [facility]

 

           Fluzone    Fluzone    2020-09-10 Completed             [facility]



                                 16:49:00                         - [facility]

 

           Fluzone    Fluzone    2020-09-10 Completed             [facility]



                                 16:49:00                         - [facility]

 

           Fluzone    Fluzone    2020-09-10 Completed             [facility]



                                 16:49:00                         - [facility]

 

           Fluzone    Fluzone    2020-09-10 Completed             [facility]



                                 16:49:00                         - [facility]

 

           Fluzone    Fluzone    2020-09-10 Completed             [facility]



                                 16:49:00                         - [facility]

 

           Fluzone    Fluzone    2020-09-10 Completed             [facility]



                                 16:49:00                         - [facility]

 

           Fluzone    Fluzone    2020-09-10 Completed             [facility]



                                 16:49:00                         - [facility]

 

           Fluzone    Fluzone    2020-09-10 Completed             [facility]



                                 16:49:00                         - [facility]

 

           Fluzone    Fluzone    2020-09-10 Completed             [facility]



                                 16:49:00                         - [facility]

 

           Fluzone    Fluzone    2020-09-10 Completed             [facility]



                                 16:49:00                         - [facility]

 

           Fluzone    Fluzone    2020-09-10 Completed             [facility]



                                 16:49:00                         - [facility]

 

           Fluzone    Fluzone    2020-09-10 Completed             [facility]



                                 16:49:00                         - [facility]

 

           Fluzone    Fluzone    2020-09-10 Completed             [facility]



                                 16:49:00                         - [facility]

 

           Fluzone    Fluzone    2020-09-10 Completed             [facility]



                                 16:49:00                         - [facility]

 

           Fluzone    Fluzone    2020-09-10 Completed             [facility]



                                 16:49:00                         - [facility]

 

           Fluzone    Fluzone    2020-09-10 Completed             [facility]



                                 16:49:00                         - [facility]

 

           Fluzone    Fluzone    2020-09-10 Completed             [facility]



                                 16:49:00                         - [facility]

 

           Fluzone    Fluzone    2020-09-10 Completed             [facility]



                                 16:49:00                         - [facility]

 

           Fluzone    Fluzone    2020-09-10 Completed             [facility]



                                 16:49:00                         - [facility]

 

           Fluzone    Fluzone    2020-09-10 Completed             [facility]



                                 16:49:00                         - [facility]

 

           Fluzone    Fluzone    2020-09-10 Completed             [facility]



                                 16:49:00                         - [facility]

 

           Fluzone    Fluzone    2020-09-10 Completed             [facility]



                                 16:49:00                         - [facility]

 

           Fluzone    Fluzone    2020-09-10 Completed             [facility]



                                 16:49:00                         - [facility]

 

           Fluzone    Fluzone    2020-09-10 Completed             [facility]



                                 16:49:00                         - [facility]

 

           Fluzone    Fluzone    2020-09-10 Completed             [facility]



                                 16:49:00                         - [facility]

 

           Fluzone    Fluzone    2020-09-10 Completed             [facility]



                                 16:49:00                         - [facility]

 

           Fluzone    Fluzone    2020-09-10 Completed             [facility]



                                 16:49:00                         - [facility]

 

           Fluzone    Fluzone    2020-09-10 Completed             [facility]



                                 16:49:00                         - [facility]

 

           Fluzone    Fluzone    2020-09-10 Completed             [facility]



                                 16:49:00                         - [facility]

 

           Fluzone    Fluzone    2020-09-10 Completed             [facility]



                                 16:49:00                         - [facility]

 

           Fluzone    Fluzone    2020-09-10 Completed             [facility]



                                 16:49:00                         - [facility]

 

           Fluzone    Fluzone    2020-09-10 Completed             [facility]



                                 16:49:00                         - [facility]

 

           Fluzone    Fluzone    2020-09-10 Completed             [facility]



                                 16:49:00                         - [facility]

 

           Fluzone    Fluzone    2020-09-10 Completed             [facility]



                                 16:49:00                         - [facility]

 

           Fluzone    Fluzone    2020-09-10 Completed             [facility]



                                 16:49:00                         - [facility]

 

           Fluzone    Fluzone    2020-09-10 Completed             [facility]



                                 16:49:00                         - [facility]

 

           Fluzone    Fluzone    2020-09-10 Completed             [facility]



                                 16:49:00                         [facility]

 

           Fluzone    Fluzone    2020-09-10 Completed             [facility]



                                 16:49:00                         [facility]

 

           Fluzone    Fluzone    2020-09-10 Completed             [facility]



                                 16:49:00                         [facility]

 

           Kenalog    Kenalog    2019 Completed             [facility]



           (Triamcinolone) (Triamcinolone) 09:47:00                         [facility]

 

           Hyalgan 20 mg Hyalgan 20 mg 2019 Completed             [facility]



                                 13:45:00                         [facility] [facility]

 

           Hyalgan 20 mg Hyalgan 20 mg 2019 Completed             [facility]

pirit



                                 13:45:00                         [facility]

 

           LIDOCAINE HCL LIDOCAINE HCL 2019 Completed             [facility]

[facility]



           10MG/ML    10MG/ML    13:44:00                         [facility]

 

           LIDOCAINE HCL LIDOCAINE HCL 2019 Completed             [facility]

[facility]



           10MG/ML    10MG/ML    13:43:00                         [facility]

 

           Hyalgan 20 mg Hyalgan 20 mg 2019 Completed             [facility]

[facility]



                                 13:21:00                         [facility]

 

           LIDOCAINE HCL LIDOCAINE HCL 2019 Completed             [facility]

[facility]



           10MG/ML    10MG/ML    13:20:00                         [facility]

 

           Hyalgan 20 mg Hyalgan 20 mg 2019 Completed             [facility]

[facility]



                                 13:14:00                         [facility]

 

           LIDOCAINE HCL LIDOCAINE HCL 2019 Completed             [facility]

[facility]irit



           10MG/ML    10MG/ML    13:13:00                         [facility]

 

           LIDOCAINE HCL LIDOCAINE HCL 2019 Completed             [facility]

[facility]



           10MG/ML    10MG/ML    11:32:00                         [facility]

 

           LIDOCAINE HCL LIDOCAINE HCL 2019 Completed             [facility]

[facility]



           10MG/ML    10MG/ML    11:32:00                         [facility]

 

           Hyalgan 20 mg Hyalgan 20 mg 2019 Completed             [facility]

[facility]



                                 11:29:00                         [facility]

 

           Hyalgan 20 mg Hyalgan 20 mg 2019 Completed             [facility]

[facility]



                                 11:24:00                         [facility]







Vital Signs







             Vital Name   Observation Time Observation Value Comments     Source

 

             height       2023 11:20:00 61.50 [in_i]              [facility]

 

             weight       2023 11:20:00 192.4 [lb_av]              Common 

[facility]

 

             bmi          2023 11:20:00 35.76 kg/m2               [facility]

 

             height       2022 10:00:00 61.50 [in_i]              [facility] S

[facility]

 

             weight       2022 10:00:00 192.4 [lb_av]              [facility]

 

             temperature  2022 10:00:00 97.9 [degF]               [facility]

 

             bmi          2022 10:00:00 35.76 kg/m2               [facility]

 

             blood pressure 2022 10:00:00 129 mm[Hg]                Common

 [facility] -



             systolic                                            [facility]

 

             blood pressure 2022 10:00:00 76 mm[Hg]                 [facility]

 [facility] -



             diastolic                                           [facility]

 

             height       2022 14:20:00 61.50 [in_i]              [facility]

 

             weight       2022 14:20:00 187.0 [lb_av]              [facility]

 

             temperature  2022 14:20:00 97.2 [degF]               [facility]

 

             bmi          2022 14:20:00 34.76 kg/m2               [facility]

 

             oximetry     2022 14:20:00 95 %                      [facility]

 

             respiratory rate 2022 14:20:00 18 /min                   Comm

on [facility]

 

             blood pressure 2022 14:20:00 132 mm[Hg]                Common

 [facility] -



             systolic                                            [facility]

 

             blood pressure 2022 14:20:00 78 mm[Hg]                 [facility]

 [facility] -



             diastolic                                           [facility]

 

             height       2022-10-06 10:30:00 61.50 [in_i]              [facility]

pirit -



                                                                 [facility]

 

             weight       2022-10-06 10:30:00 186 [lb_av]               [facility]

pirit -



                                                                 [facility]

 

             temperature  2022-10-06 10:30:00 97.2 [degF]               [facility]

pirit -



                                                                 [facility]

 

             bmi          2022-10-06 10:30:00 34.57 kg/m2               [facility]

pirit -



                                                                 [facility]

 

             blood pressure 2022-10-06 10:30:00 134 mm[Hg]                [facility]

 [facility] -



             systolic                                            [facility]

 

             blood pressure 2022-10-06 10:30:00 72 mm[Hg]                 [facility]

 [facility] -



             diastolic                                           [facility]

 

             height       2022 16:20:00 61.5 [in_i]               [facility] S

pirit [facility]

 

             weight       2022 16:20:00 186.8 [lb_av]              Common 

[facility] -



                                                                 [facility]

 

             temperature  2022 16:20:00 97.8 [degF]               [facility]

pirit -



                                                                 [facility]

 

             bmi          2022 16:20:00 34.72 kg/m2               [facility]

pirit [facility]

 

             oximetry     2022 16:20:00 97 %                      [facility]

pirit -



                                                                 [facility]

 

             blood pressure 2022 16:20:00 138 mm[Hg]                [facility]

 [facility] -



             systolic                                            [facility]

 

             blood pressure 2022 16:20:00 80 mm[Hg]                 [facility]

 [facility] -



             diastolic                                           [facility]

 

             height       2022 10:30:00 61.50 [in_i]              [facility]

[facility]irit -



                                                                 [facility]

 

             weight       2022 10:30:00 187 [lb_av]               [facility]

[facility]irit -



                                                                 [facility]

 

             temperature  2022 10:30:00 97.2 [degF]               [facility]

[facility]irit -



                                                                 [facility]

 

             bmi          2022 10:30:00 34.76 kg/m2               [facility]

[facility]irit -



                                                                 [facility]

 

             blood pressure 2022 10:30:00 132 mm[Hg]                [facility]

 [facility] -



             systolic                                            [facility]

 

             blood pressure 2022 10:30:00 74 mm[Hg]                 [facility]

 [facility] -



             diastolic                                           [facility]

 

             height       2022 16:20:00 61.50 [in_i]              [facility]

 

             weight       2022 16:20:00 186.8 [lb_av]              Common 

[facility]

 

             temperature  2022 16:20:00 97.8 [degF]               [facility]

 

             bmi          2022 16:20:00 34.72 kg/m2               [facility]

 

             oximetry     2022 16:20:00 97 %                      [facility]

 

             respiratory rate 2022 16:20:00 18 /min                   Comm

on [facility]

 

             blood pressure 2022 16:20:00 138 mm[Hg]                Common

 [facility] -



             systolic                                            [facility]

 

             blood pressure 2022 16:20:00 80 mm[Hg]                 Common

 [facility] -



             diastolic                                           [facility]

 

             height       2022 10:30:00 61.50 [in_i]              [facility]

 

             weight       2022 10:30:00 187 [lb_av]               [facility]

 

             temperature  2022 10:30:00 97.6 [degF]               [facility] S

pirit [facility]

 

             bmi          2022 10:30:00 34.76 kg/m2               [facility]

 

             blood pressure 2022 10:30:00 132 mm[Hg]                [facility]

 [facility] -



             systolic                                            [facility]

 

             blood pressure 2022 10:30:00 74 mm[Hg]                 Common

 [facility] -



             diastolic                                           [facility]

 

             height       2022 11:00:00 61.50 [in_i]              [facility]

 

             weight       2022 11:00:00 187.1 [lb_av]              Common 

[facility]

 

             bmi          2022 11:00:00 34.78 kg/m2               [facility] S

pirit -



                                                                 [facility]

 

             blood pressure 2022 11:00:00 134 mm[Hg]                [facility]

 [facility] -



             systolic                                            [facility]

 

             blood pressure 2022 11:00:00 82 mm[Hg]                 [facility]

 [facility] -



             diastolic                                           [facility]

 

             height       2022 10:30:00 61.50 [in_i]              [facility]

pirit -



                                                                 [facility]

 

             weight       2022 10:30:00 192 [lb_av]               [facility]

[facility]irit -



                                                                 [facility]

 

             bmi          2022 10:30:00 35.69 kg/m2               [facility]

[facility]irit -



                                                                 [facility]

 

             blood pressure 2022 10:30:00 140 mm[Hg]                [facility]

 [facility] -



             systolic                                            [facility]

 

             blood pressure 2022 10:30:00 76 mm[Hg]                 [facility]

 [facility] -



             diastolic                                           [facility]

 

             height       2022 13:00:00 61.50 [in_i]              Common S

pirit -



                                                                 [facility]

 

             weight       2022 13:00:00 189 [lb_av]               Common S

pirit [facility]

 

             temperature  2022 13:00:00 97.3 [degF]               Common S

pirit [facility]

 

             bmi          2022 13:00:00 35.13 kg/m2               [facility]

[facility]irit [facility]

 

             oximetry     2022 13:00:00 98 %                      [facility]

[facility]irit [facility]

 

             respiratory rate 2022 13:00:00 16 /min                   Comm

on [facility] -



                                                                 [facility]

 

             blood pressure 2022 13:00:00 130 mm[Hg]                [facility]

 [facility] -



             systolic                                            [facility]

 

             blood pressure 2022 13:00:00 80 mm[Hg]                 [facility]

 [facility] -



             diastolic                                           [facility]

 

             height       2022 11:00:00 61.50 [in_i]              [facility] S

pirit [facility]

 

             weight       2022 11:00:00 192 [lb_av]               [facility]

[facility]irit -



                                                                 [facility]

 

             temperature  2022 11:00:00 97.6 [degF]               [facility] S

pirit -



                                                                 [facility]

 

             bmi          2022 11:00:00 35.69 kg/m2               [facility]

[facility]irit -



                                                                 [facility]

 

             blood pressure 2022 11:00:00 134 mm[Hg]                [facility]

 [facility] -



             systolic                                            [facility]

 

             blood pressure 2022 11:00:00 82 mm[Hg]                 [facility]

 [facility] -



             diastolic                                           [facility]

 

             height       2022 10:30:00 61.50 [in_i]              [facility]

pirit [facility]

 

             weight       2022 10:30:00 192 [lb_av]               [facility]

pirit [facility]

 

             temperature  2022 10:30:00 97.3 [degF]               Common S

pirit [facility]          2022 10:30:00 35.69 kg/m2               [facility] S

pirit -



                                                                 [facility]

 

             blood pressure 2022 10:30:00 126 mm[Hg]                [facility]

 [facility] -



             systolic                                            [facility]

 

             blood pressure 2022 10:30:00 72 mm[Hg]                 [facility]

 [facility] -



             diastolic                                           [facility]

 

             height       2022 10:30:00 61.50 [in_i]              [facility]

pirit [facility]

 

             weight       2022 10:30:00 192 [lb_av]               [facility]

pirit -



                                                                 [facility]

 

             temperature  2022 10:30:00 97.3 [degF]               [facility] S

pirit -



                                                                 [facility]

 

             bmi          2022 10:30:00 35.69 kg/m2               [facility] S

pirit [facility]

 

             blood pressure 2022 10:30:00 128 mm[Hg]                [facility]

 [facility] -



             systolic                                            [facility]

 

             blood pressure 2022 10:30:00 76 mm[Hg]                 [facility]

 [facility] -



             diastolic                                           [facility]

 

             height       2022 11:00:00 61.50 [in_i]              [facility]

 

             weight       2022 11:00:00 192.4 [lb_av]              [facility]

 

             temperature  2022 11:00:00 97.3 [degF]               [facility]

 

             bmi          2022 11:00:00 35.76 kg/m2               [facility]

 

             oximetry     2022 11:00:00 95 %                      [facility]

 

             respiratory rate 2022 11:00:00 16 /min                   Comm

on [facility]

 

             blood pressure 2022 11:00:00 134 mm[Hg]                Common

 [facility]



             systolic                                            [facility]

 

             blood pressure 2022 11:00:00 82 mm[Hg]                 [facility]



             diastolic                                           [facility]

 

             BMI          2021 21:01:00 33.66 kg/m2               [facility]

 

             Oxygen saturation in 2021 21:01:00 95 /min                   

[facility]



             Arterial blood by                                        [place] [facility]

re[facility]



             Pulse oximetry                                        [facility]

 

             Systolic blood 2021 21:01:00 134 mm[Hg]                [facility]

[facility]



             pressure                                            [facility]

 

             Diastolic blood 2021 21:01:00 84 mm[Hg]                 Unive

rsity of



             [facility]

 

             Heart rate   2021 21:01:00 84 /min                   [facility]

 

             Body height  2021 21:01:00 160 cm                    [facility]

 

             Body weight  2021 21:01:00 86.183 kg                 [facility]

 

             height       2021 13:00:00 61.50 [in_i]              [facility]

 

             weight       2021 13:00:00 189 [lb_av]               [facility]

 

             temperature  2021 13:00:00 97.1 [degF]               [facility]

 

             bmi          2021 13:00:00 35.13 kg/m2               [facility]

 

             blood pressure 2021 13:00:00 122 mm[Hg]                Common

 [facility] -



             systolic                                            [facility]

 

             blood pressure 2021 13:00:00 76 mm[Hg]                 [facility] -



             diastolic                                           [facility]

 

             height       2021 10:30:00 61.50 [in_i]              [facility]

 

             weight       2021 10:30:00 189 [lb_av]               [facility]

 

             bmi          2021 10:30:00 35.13 kg/m2               [facility]

 

             height       2021 16:20:00 61.50 [in_i]              [facility]

 

             weight       2021 16:20:00 187.8 [lb_av]              [facility]

 

             temperature  2021 16:20:00 97.3 [degF]               [facility]

 

             bmi          2021 16:20:00 34.91 kg/m2               [facility]

 

             oximetry     2021 16:20:00 100 %                     [facility]

 

             respiratory rate 2021 16:20:00 16 /min                   Comm

on [facility]

 

             blood pressure 2021 16:20:00 136 mm[Hg]                Common

 [facility]



             systolic                                            [facility]

 

             blood pressure 2021 16:20:00 72 mm[Hg]                 [facility]



             diastolic                                           [facility]







Procedures







                Procedure       Date / Time     Performing Clinician Source



                                Performed                       

 

                MEDICAL         2022 05:01:00 Doctor Unassigned, No [facility]

[facility] [place]



                RELEASE/CLEARANCE FORMS                 Name            [facility] 

[facility]

 

                MEDICAL         2022 06:01:00 Doctor Unassigned, No [facility]

[facility] [place]



                RELEASE/CLEARANCE FORMS                 Name            [facility]

 

                AUTHORIZATION FOR 2022 06:01:00 Doctor Unassigned, No Univ

[facility]



                RELEASE OF PHI                  Name            [facility]

 

                EKG[facility]12 LEAD     2021 21[facility]08[facility]22 [facility]

 

                EXTERNAL PROVIDER - [facility] 2021 06:01:00 Doctor Unassigned, N

o [facility]







Plan of Care







             Planned Activity Planned Date Details      Comments     Source

 

             Future Scheduled 2023-03-10   COVID-19 VACCINE (#1)              [facility]



             Test         01:49:57     [code = COVID-19              



                                       VACCINE (#1)]              

 

             Future Scheduled 2023-03-10   BREAST CANCER              [facility]



             Test         01:49:57     SCREENING [code =              



                                       BREAST CANCER              



                                       SCREENING]                

 

             Future Scheduled 2023-03-10   COLONOSCOPY SCREENING              [facility]



             Test         01:49:57     [code = COLONOSCOPY              



                                       SCREENING]                

 

             Future Scheduled 2023-03-10   SHINGLES VACCINES (1              Met

[facility]



             Test         01:49:57     of 2) [code = SHINGLES              



                                       VACCINES (1 of 2)]              

 

             Future Scheduled 2023-03-10   65+ PNEUMOCOCCAL              [facility]

 Hospital



             Test         01:49:57     VACCINE (1 - PCV)              



                                       [code = 65+               



                                       PNEUMOCOCCAL VACCINE              



                                       (1 - PCV)]                

 

             Future Scheduled 2023-03-10   INFLUENZA VACCINE              [facility]

[facility]



             Test         01:49:57     [code = INFLUENZA              



                                       VACCINE]                  

 

             Future Scheduled 2023   COVID-19 VACCINE (#1)              [facility]



             Test         10:41:50     [code = COVID-19              



                                       VACCINE (#1)]              

 

             Future Scheduled 2023   BREAST CANCER              [facility]



             Test         10:41:50     SCREENING [code =              



                                       BREAST CANCER              



                                       SCREENING]                

 

             Future Scheduled 2023   COLONOSCOPY SCREENING              [facility]



             Test         10:41:50     [code = COLONOSCOPY              



                                       SCREENING]                

 

             Future Scheduled 2023   SHINGLES VACCINES (1              Met

[facility]



             Test         10:41:50     of 2) [code = SHINGLES              



                                       VACCINES (1 of 2)]              

 

             Future Scheduled 2023   65+ PNEUMOCOCCAL              [facility]i

 Hospital



             Test         10:41:50     VACCINE (1 - PCV)              



                                       [code = 65+               



                                       PNEUMOCOCCAL VACCINE              



                                       (1 - PCV)]                

 

             Future Scheduled 2023   INFLUENZA VACCINE              [facility]



             Test         10:41:50     [code = INFLUENZA              



                                       VACCINE]                  

 

             Future Scheduled 2023   COVID-19 VACCINE (#1)              [facility]



             Test         03:09:43     [code = COVID-19              



                                       VACCINE (#1)]              

 

             Future Scheduled 2023   BREAST CANCER              [facility]



             Test         03:09:43     SCREENING [code =              



                                       BREAST CANCER              



                                       SCREENING]                

 

             Future Scheduled 2023   COLONOSCOPY SCREENING              [facility]



             Test         03:09:43     [code = COLONOSCOPY              



                                       SCREENING]                

 

             Future Scheduled 2023   SHINGLES VACCINES (1              Met

[facility]



             Test         03:09:43     of 2) [code = SHINGLES              



                                       VACCINES (1 of 2)]              

 

             Future Scheduled 2023   65+ PNEUMOCOCCAL              [facility]



             Test         03:09:43     VACCINE (1 - PCV)              



                                       [code = 65+               



                                       PNEUMOCOCCAL VACCINE              



                                       (1 - PCV)]                

 

             Future Scheduled 2023   INFLUENZA VACCINE              [facility]



             Test         03:09:43     [code = INFLUENZA              



                                       VACCINE]                  

 

             Future Scheduled 2022   HEPATITIS B VACCINES              Met

[facility]



             Test         10:21:52     (1 of 3 - 3-dose              



                                       series) [code =              



                                       HEPATITIS B VACCINES              



                                       (1 of 3 - 3-dose              



                                       series)]                  

 

             Future Scheduled 2022   COVID-19 VACCINE (#1)              [facility]



             Test         10:21:52     [code = COVID-19              



                                       VACCINE (#1)]              

 

             Future Scheduled 2022   BREAST CANCER              [facility]



             Test         10:21:52     SCREENING [code =              



                                       BREAST CANCER              



                                       SCREENING]                

 

             Future Scheduled 2022   COLONOSCOPY SCREENING              [facility]



             Test         10:21:52     [code = COLONOSCOPY              



                                       SCREENING]                

 

             Future Scheduled 2022   SHINGLES VACCINES (1              Met

[facility]



             Test         10:21:52     of 2) [code = SHINGLES              



                                       VACCINES (1 of 2)]              

 

             Future Scheduled 2022   65+ PNEUMOCOCCAL              [facility]

 [facility]



             Test         10:21:52     VACCINE (1 - PCV)              



                                       [code = 65+               



                                       PNEUMOCOCCAL VACCINE              



                                       (1 - PCV)]                

 

             Future Scheduled 2022   INFLUENZA VACCINE              [facility]

[facility]



             Test         10:21:52     [code = INFLUENZA              



                                       VACCINE]                  







Encounters







        Start   End     Encounter Admission Attending Care    Care    Encounter 

Source



        Date/Time Date/Time Type    Type    Clinicians Facility Department ID   

   

 

        2023         Outpatient         [facility]  [id number]

 Common



        10:54:00                         Ilda                  [id number]   [facility]

 

        2023         Outpatient         Shakira BALES [facility]  [id number]

2 Common



        16:26:00                                                 [id number]   [facility]

 

        2022         Outpatient         Bales, Na [facility]  [facility]  [id number]

2 Common



        08:31:00                                                    [facility]

 

        2022         Outpatient         Bales, Na [facility]  [facility]  [id number]

2 Common



        10:59:01                                                    [facility]

 

        2022-10-10         Outpatient         Bales, Na [facility]  [facility]  [id number]

2 Common



        10:30:01                                                    [facility]

 

        2022         Outpatient         Bales, Na [facility]  [facility]  [id number]

2 Common



        14:35:00                                                    [facility]

 

        2022         Outpatient         Bales, Na [facility]  [facility]  [id number]

2 Common



        10:41:01                                                    [facility]

 

        2022         Outpatient         Bales, Na [facility]  [facility]  [id number]

2 Common



        10:17:00                                                    [facility]

 

        2022         Outpatient         Bales, Na [facility]  [facility]  [id number]

2 Common



        11:59:01                                                    [facility]

 

        2022         Outpatient         Bales, Na [facility]  [facility]  [id number]

2 Common



        13:05:00                                                    [facility]

 

        2022         Outpatient         Bales, Na [facility]  [facility]  [id number]

2 Common



        13:36:01                                                    [facility]

 

        2022         Outpatient 3       [id number]  ENC[facility]   OR     [id number]

 [facility]



        10:14:56                                                 0325    



                                                                        Health



                                                                        Rehabil



                                                                        itation



                                                                        [place]



                                                                        d

 

        2022-03-10         Outpatient 3       [id number]  ENCPL   REF     [id number]

 [facility]



        08:56:23                                                 0310    



                                                                        Health



                                                                        Rehabil



                                                                        itation



                                                                        [place]



                                                                        d

 

        2022         Outpatient 3       [id number]  ENCPL   REF     [phone]

 [facility]



        15:01:57                                                 [id number]    



                                                                        Health



                                                                        Rehabil



                                                                        itation



                                                                        [place]



                                                                        d

 

        2022         Outpatient         Bales, Na [facility]  [facility]  [id number]

2 Common



        14:23:00                                                    [facility]

 

        2022-02-15         Outpatient         Bales, Na [facility]  [facility]  [id number]

2 Common



        08:54:02                                                 [id number]   [facility]

 

        2022         Outpatient         Bales, Na [facility]  [facility]  [id number]

2 Common



        13:45:16                                                 [id number]   [facility]

 

        2022         Outpatient         Bales, Na [facility]  [facility]  [id number]

2 Common



        12:49:56                                                 [id number]   [facility]

 

        2022         Outpatient         Bales, Na [facility]  [facility]  [id number]

2 Common



        12:08:12                                                 [id number]   [facility]

 

        2022         Outpatient         Bales, Na [facility]  [facility]  [id number]

2 Common



        12:07:42                                                 [id number]   [facility]

 

        2022         Outpatient         Bales, Na [facility]  [facility]  [id number]

2 Common



        11:54:04                                                 [id number]   [facility]

 

        2022         Outpatient         Bales, Na [facility]  [facility]  [id number]

2 Common



        11:53:42                                                 [id number]   [facility]

 

        2022         Outpatient         Bales, Na [facility]  [facility]  [id number]

2 Common



        11:53:11                                                 [id number]   [facility]

 

        2022         Outpatient         Bales, Na [facility]  [facility]  [id number]

2 Common



        11:25:47                                                 [id number]   [facility]

 

        2023 Outpatient         KRYSTYNA NGUYEN  [id number]

64 Krystyna



        09:00:00 09:00:00                 DANYELL Boydol

ailyn

 

        2023 (TEL)                   [facility]  [facility]  [id number] Co

mmon



        00:00:00 00:00:00                                                 [facility]

 

        2023 (TEL)                   [facility]  [facility]  [id number] Co

mmon



        00:00:00 00:00:00                                                 [facility]

 

        2023 OL DIG E/M                 [facility]  [facility]  [id number]

 Common



        00:00:00 00:00:00 SVC 11-20                                         Spir

it



                        MIN                                             - [facility]



                                                                        [facility]

 

        2023 (TEL)                   [facility]  [facility]  [id number] Co

mmon



        00:00:00 00:00:00                                                 [facility]

 

        2022 OFFICE                  [facility]  [facility]  [id number] Co

mmon



        00:00:00 00:00:00 VISIT EST                                         Spir

it



                        PT LEVEL 3                                         - [facility]



                                                                        [facility]

 

        2022 (TEL)                   [facility]  [facility]  [id number] Co

mmon



        00:00:00 00:00:00                                                 [facility]

 

        2022 OFFICE                  [facility]  [facility]  [id number] Co

mmon



        00:00:00 00:00:00 VISIT EST                                         Spir

it



                        PT LEVEL 3                                         - [facility]

 

        2022-10-06 2022-10-06 NON-BILLAB                 [facility]  [facility]  [id number]

 Common



        00:00:00 00:00:00 LE VISIT                                         [facility]

t



                                                                        [facility]

 

        2022 SUB ANNUAL                 [facility]  [facility]  [id number]

 Common



        00:00:00 00:00:00 MCR                                             [facility]



                        WELLNESS                                         - [facility]



                        VISIT                                           [facility]

 

        2022 NON-BILLAB                 [facility]  [facility]  [id number]

 Common



        00:00:00 00:00:00 LE VISIT                                         [facility]

t



                                                                        [facility]

 

        2022 OFFICE                  [facility]  ST[facility]  [id number] Co

mmon



        00:00:00 00:00:00 VISIT EST                                         Spir

it



                        PT LEVEL 3                                         - [facility]

 

        2022 (TEL)                   [facility]  [facility]  [id number] Co

mmon



        00:00:00 00:00:00                                                 [facility]

 

        2022-08-10 2022-08-10 (TEL)                   [facility]  [facility]  [id number] Co

mmon



        00:00:00 00:00:00                                                 [facility]

 

        2022 NON-BILLAB                 [facility]  [facility]  [id number]

 Common



        00:00:00 00:00:00 LE VISIT                                         [facility]

 

        2022-07-15 2022-07-15 (TEL)                   [facility]  ST[facility]  [id number] Co

mmon



        00:00:00 00:00:00                                                 [facility]

 

        2022 (TEL)                   [facility]  ST[facility]  [id number] Co

mmon



        00:00:00 00:00:00                                                 [facility]

 

        2022 OFFICE                  [facility]  ST[facility]  [id number] Co

mmon



        00:00:00 00:00:00 VISIT                                           [facility] 4                                         [facility]

 

        2022 Telephone         Keily,    [facility]    [phone]

6846 Univers



        00:00:00 00:00:00                 Hector .1.13.10         

ity of



                                                [place] [id number]         [place]

[place]



                                                PROFESSIO [phone]         [place] [id number]             [facility]                 

 

        2022 Orders          Doctor  ADAL    1.2.840.114 [id number]

82 Univers



        00:00:00 00:00:00 Only            Unassigned, KENZIE   350.1.13.10       

  ity of



                                        [facility] [id number]         Cody

as



                                                        [phone]         Medi

reyna



                                                        [address]             Branch

 

        2022 (TEL)                   [facility]  [facility]  [id number] Co

mmon



        00:00:00 00:00:00                                                 [facility]

 

        2022 (TEL)                   [facility]  [facility]  [id number] Co

mmon



        00:00:00 00:00:00                                                 [facility]

 

        2022 NON-BILLAB                 [facility]  ST[facility]  [id number]

 Common



        00:00:00 00:00:00 LE VISIT                                         [facility]

 

        2022 OFFICE                  STL[facility]  STLMLC  [id number] Co

mmon



        00:00:00 00:00:00 VISIT                                           [facility]



                        ESTAB PT                                         - [facility]



                        LEVEL 4                                         [facility]

 

        2022 NON-BILLAB                 [facility]  ST[facility]  [id number]

 Common



        00:00:00 00:00:00 LE VISIT                                         [facility]

 

        2022 (TEL)                   [facility]  ST[facility]  [id number] Co

mmon



        00:00:00 00:00:00                                                 [facility]

 

        2022 NON-BILLAB                 [facility]  ST[facility]  [id number]

 Common



        00:00:00 00:00:00 LE VISIT                                         [facility]

 

        2022 (TEL)                   [facility]  ST[facility]  [id number] Co

mmon



        00:00:00 00:00:00                                                 [facility]

 

        2022 (TEL)                   [facility]  [facility]  [id number] Co

mmon



        00:00:00 00:00:00                                                 [facility]

 

        2022 (TEL)                   [facility]  ST[facility]  [id number] Co

mmon



        00:00:00 00:00:00                                                 [facility]

 

        2022 Telephone         [facility],    [facility]    [phone] [id number]

2382 Univers



        00:00:00 00:00:00                 [facility] [id number]         

[facility] [id number]         Nick LYON [phone]         Me

dical



                                                [facility]     [address]             Branch



                                                [facility]                 

 

        2022 OFFICE                  [facility]  STLMLC  [id number] Co

mmon



        00:00:00 00:00:00 VISIT                                           [facility]



                        ESTAB PT                                         - [facility]



                        LEVEL 4                                         [facility]

 

        2022 (TEL)                   [facility]  ST[facility]  [id number] Co

mmon



        00:00:00 00:00:00                                                 [facility]

 

        2022 (TEL)                   [facility]  STL[facility]C  [id number] Co

mmon



        00:00:00 00:00:00                                                 [facility]

 

        2022 Orders          Doctor  ADAL    1.2.840.114 [id number]

96 Univers



        00:00:00 00:00:00 Only            Unassigned, KENZIE   350.1.13.10       

  ity of



                                        [facility] [id number]         Cody

as



                                                        [phone]         [address]

 

        2022 OFFICE                  [facility]  STLMLC  [id number] Co

mmon



        00:00:00 00:00:00 VISIT EST                                         Spir

it



                        PT LEVEL 3                                         - [facility]

 

        2022 Orders          Doctor  ADAL    1.2.840.114 [id number]

82 Univers



        00:00:00 00:00:00 Only            Unassigned, KENZIE   350.1.13.10       

  ity of



                                        [facility] [id number]         Cody

as



                                                        [phone]         [address]

 

        2022 (TEL)                   [facility]  [facility]  [id number] Co

mmon



        00:00:00 00:00:00                                                 [facility]

 

        2022 (TEL)                   [facility]  STL[facility]  [id number] Co

mmon



        00:00:00 00:00:00                                                 [facility]

 

        2022 OL DIG E/M                 [facility]  [facility]  [id number]

 Common



        00:00:00 00:00:00 [facility] 11-20                                         Spir

it



                        MIN                                             [facility]

 

        2022 Outpatient R       KEILY    [facility]    [id number]

463 Univers



        12:47:38 23:59:00                 HECTOR toscano o

f



                                                                        [facility]

 

        2022 [facility]         [facility]    1.2.840.114 [id number]

367 Univers



        12:47:38 23:59:00 Encounter         Hector .1.13.10       

  ity of



                                                [place] [id number]         [place]

[place]



                                                PROFESSIO [phone]         Me

dical



                                                NAL     843             [facility]                 

 

        2022 Outpatient R       KEILY,    [facility]    [id number]

463 Univers



        13:00:00 13:00:00                 HECTOR salazar



                                                                        [facility]

 

        2022 Outpatient R       KEILY,    [facility]    [id number]

463 Univers



        12:47:38 12:47:38                 HECTOR salazar



                                                                        [facility]

 

        2021 Office          Keily,    [facility]    1.2.840.114 [id number]

03 Univers



        14:40:00 15:22:14 Visit           Hcetor .1.13.10         

ity of



                                                [place] 4.2.7.2.686         [place]

[place]



                                                PROFESSIO [phone]         Me

dical



                                                NAL     059             [facility]                 

 

        2021 Outpatient R       KEILY,    [facility]    [id number]

312 Univers



        14:40:00 15:22:14                 HECTOR shukla

[facility]

 

        2021 Outpatient R       KEILY,    [facility]    [id number]

312 Univers



        14:40:00 15:22:14                 HECTOR shukla

[facility]

 

        2021 Orders          Doctor  ADAL    1.2.840.114 [id number]

41 Univers



        00:00:00 00:00:00 Only            Unassigned, KENZIE   350.1.13.10       

  ity of



                                        [facility] [id number]         Cody

as



                                                        [phone]         [address]

 

        2021 Orders          Doctor  ADAL    1.2.840.114 [id number]

93 Univers



        00:00:00 00:00:00 Only            Unassigned, KENZIE   350.1.13.10       

  ity of



                                        [facility] 4.2.7.2.686         Cody

as



                                                        [phone]         [address]

 

        2021 [facility]TEL)                   [facility]  [id number] Co

mmon



        00:00:00 00:00:00                                                 [facility]

 

        2021 (TEL)                   [facility]  [facility]  [id number] Co

mmon



        00:00:00 00:00:00                                                 [facility]

 

        2021 (TEL)                   [facility]  ST[facility]  [id number] Co

mmon



        00:00:00 00:00:00                                                 [facility]

 

        2021 OFFICE                  [facility]  STLMLC  [id number] Co

mmon



        00:00:00 00:00:00 VISIT                                           [facility]



                        ESTAB PT                                         [facility]



                        LEVEL 4                                         [facility]

 

        2021 ([place]) Nurse                 STLMLC  STLMLC  [id number]

 Common



        00:00:00 00:00:00 Visit                                           [facility]

 

        2021 (TEL)                   STLMLC  STLMLC  [id number] Co

mmon



        00:00:00 00:00:00                                                 [facility]

 

        2021 (TEL)                   STLMLC  STLMLC  [id number] Co

mmon



        00:00:00 00:00:00                                                 [facility]

 

        2021 OFFICE                  STLMLC  STLMLC  [id number] Co

mmon



        00:00:00 00:00:00 VISIT EST                                         Spir

it



                        PT LEVEL 3                                         [facility]

 

        2021 Outpatient                 STLMLC  STLMLC  [id number]

 Common



        00:00:00 00:00:00                                                 [facility]

 

        2021 Outpatient                 STLMLC  STLMLC  [id number]

 Common



        00:00:00 00:00:00                                                 [facility]

 

        2020 Outpatient                 STLMLC  STLMLC  [id number]

 Common



        00:00:00 00:00:00                                                 [facility]

 

        2020-12-10 2020-12-10 Outpatient                 STLMLC  STLMLC  [id number]

 Common



        00:00:00 00:00:00                                                 [facility]

 

        2020 Outpatient                 STLMLC  STLMLC  [id number]

 Common



        00:00:00 00:00:00                                                 [facility]

 

        2020 Outpatient                 STLMLC  STLMLC  [id number]

 Common



        00:00:00 00:00:00                                                 [facility]

 

        2020-10-12 2020-10-12 Outpatient                 STLMLC  STLMLC  [id number]

 Common



        00:00:00 00:00:00                                                 [facility]

 

        2020-09-15 2020-09-15 Outpatient                 STLMLC  STLMLC  [id number]

 Common



        00:00:00 00:00:00                                                 [facility]

 

        2020-07-15 2020-07-15 Outpatient                 [facility]

[id number] Common



        08:41:00 08:41:00                         [place]   [address]         Spir

it



                                                Drive   [facility]

 

        2020 Outpatient                 [facility]

[id number] Common



        11:00:00 11:00:00                         [place]   [address]         Spir

it



                                                Drive   [facility]

 

        2020 Outpatient                 [facility]

[id number] Common



        16:26:00 16:26:00                         [place]   [address]         Spir

it



                                                Drive   [facility]

 

        2020 Outpatient                 [facility]

[id number] Common



        16:01:00 16:01:00                         [place]   [address]         Spir

it



                                                Drive   [facility]

 

        2020 Outpatient                 [facility]

[id number] Common



        10:20:00 10:20:00                         t [place]   [address]         Spir

it



                                                Drive   [facility]

 

        2020 Outpatient                 [facility]

[id number] Common



        10:15:00 10:15:00                         [place]   [address]         Spir

it



                                                Drive   [facility]

 

        2020 Outpatient                 [facility]

[id number] Common



        10:40:00 10:40:00                         [place]   [address]         Spir

it



                                                Drive   [facility]

 

        2020 Outpatient                 [facility]

[id number] Common



        11:20:00 11:20:00                         t [place]   [address]         Spir

it



                                                Drive   [facility]

 

        2020 Outpatient                 [facility]

[id number] Common



        11:45:00 11:45:00                         t [place]   [address]         Spir

it



                                                Drive   [facility]

 

        2020 Outpatient                 [facility]

[id number] Common



        10:23:00 10:23:00                         t [place]   [address]         Spir

it



                                                Drive   [facility]

 

        2020 Outpatient                 [facility]

[id number] Common



        15:38:00 15:38:00                         t [place]   [address]         Spir

it



                                                Drive   [facility]

 

        2020 Outpatient         [id number]

[id number] [facility]



        07:23:00 07:23:00                 _A_                   [id number]   Family



                                                                        Practic



                                                                        e

 

        2020 Outpatient                 [facility]

[id number] Common



        14:22:00 14:22:00                         t       Specialty/U         Sp

naomi



                                                Specialty rology          - [facility]



                                                [facility]          [facility]

 

        2019 Outpatient                 [facility]

[id number] Common



        09:00:00 09:00:00                         t [place]   [address]         Spir

it



                                                Drive   [facility]

 

        2019 Outpatient                 [facility]

[id number] Common



        12:18:00 12:18:00                         t [place]   [address]         Spir

it



                                                Drive   [facility]

 

        2019 Outpatient                 [facility]

[id number] Common



        13:00:00 13:00:00                         t [place]   [address]         Spir

it



                                                Drive   [facility]

 

        2019 Outpatient                 [facility]

[id number] Common



        09:22:00 09:22:00                         t [place]   [address]         Spir

it



                                                Drive   [facility]

 

        2019 Outpatient                 [facility]

[id number] Common



        13:30:00 13:30:00                         t Bone  Bone and         Spiri

t



                                                and Joint Joint           - [facility]



                                                [facility]         [facility]

 

        2019 Outpatient                 [facility]

[id number] Common



        14:00:00 14:00:00                         t Bone  Bone and         Spiri

t



                                                and Joint Joint           - [facility]



                                                [facility]         [facility]

 

        2019 Outpatient                 [facility]

[id number] Common



        11:42:00 11:42:00                         t Bone  Bone and         Spiri

t



                                                and Joint Joint           - [facility]



                                                [facility]         [facility]

 

        2019 Outpatient                 [facility]

[id number] Common



        11:00:00 11:00:00                         t Bone  Bone and         Spiri

t



                                                and Joint Joint           - [facility]



                                                [facility]         [facility]

 

        2019-04-15 2019-04-15 Outpatient                 [facility]

[id number] Common



        16:04:00 16:04:00                         t Marcandi

it



                                                [place]   [facility]

 

        2019 Outpatient                 [facility]

[id number] Common



        15:41:00 15:41:00                         t Bone  Bone and         Spiri

t



                                                and Joint Joint           - [facility]



                                                [facility]         [facility]

 

        2019 Outpatient                 [facility]

[id number] Common



        09:30:00 09:30:00                         t Bone  Bone and         Spiri

t



                                                and Joint Joint           - [facility]



                                                [facility]         [facility]

 

        2019 Outpatient                 [facility]

[id number] Common



        14:19:00 14:19:00                         t Bone  Bone and         Spiri

t



                                                and Joint Joint           - [facility]



                                                [facility]         [facility]

 

        2019 Outpatient                 [facility]

[id number] Common



        16:44:00 16:44:00                         t Marcandi

it



                                                [place]   [facility]







Results

This patient has no known results.

## 2023-03-10 NOTE — RAD REPORT
EXAM DESCRIPTION:  RAD - Chest Single View - 3/10/2023 3:25 am

 

CLINICAL HISTORY:  The patient is 72 years old and is Female; COUGH

 

TECHNIQUE:  Frontal view of the chest.

 

COMPARISON:  No relevant prior studies available.

 

FINDINGS:  Lungs:   Mildly prominent interstitial markings. No consolidation.

   Pleural space:   Unremarkable.   No pneumothorax.

   Heart:   Unremarkable.

   Mediastinum:   Unremarkable.

   Bones/joints:   Disc space narrowing with degenerative endplate changes in the spine.

         Degenerative changes involving the right glenohumeral joint.

 

IMPRESSION:  Mildly prominent interstitial markings. No consolidation.

 

Electronically signed by:   Tim Reyes MD   3/10/2023 3:33 AM CST Workstation: [id number]

 

 

 

Due to temporary technical issues with the PACS/Fluency reporting system, reports are being signed by
 the in house radiologists without review as a courtesy to insure prompt reporting. The interpreting 
radiologist is fully responsible for the content of the report.

## 2023-03-13 NOTE — EKG
Test Date:    2023-03-10               Test Time:    04:56:54

Technician:   AMA                                     

                                                     

MEASUREMENT RESULTS:                                       

Intervals:                                           

Rate:         58                                     

GA:           204                                    

QRSD:         118                                    

QT:           458                                    

QTc:          449                                    

Axis:                                                

P:            20                                     

GA:           204                                    

QRS:          103                                    

T:            31                                     

                                                     

INTERPRETIVE STATEMENTS:                                       

                                                     

Sinus bradycardia

Rightward axis

Incomplete right bundle branch block

Septal infarct, age undetermined

Abnormal ECG

Compared to ECG 02/17/2023 11:17:48

Right-axis deviation now present

Incomplete right bundle-branch block now present

Sinus rhythm no longer present

Myocardial infarct finding still present



Electronically Signed On 03-13-23 13:08:27 CDT by Dominick Sanchez

## 2023-03-24 NOTE — EDPHYS
Physician Documentation                                                                           

 [facility]                                                                 

Name: Lesly Dawson                                                                               

Age: 72 yrs                                                                                       

Sex: Female                                                                                       

: 1950                                                                                   

MRN: [id number]                                                                                   

Arrival Date: 03/10/2023                                                                          

Time: 01:51                                                                                       

Account#: [id number]                                                                            

Bed 2                                                                                             

Private MD: Shakira Jc                                                                              

ED Physician Oral Lee                                                                      

HPI:                                                                                              

03/10                                                                                             

02:52 This 72 yrs old  Female presents to ER via Ambulatory with complaints of       paulo 

      Breathing Difficulty.                                                                       

02:52 The patient has shortness of breath at rest, with light activity, during heavy          paulo 

      activity. Onset: The symptoms/episode began/occurred 2 day(s) ago. The patient's            

      shortness of breath is aggravated by coughing, light activity, prone position, supine       

      position, talking, walking. Associated signs and symptoms: The patient has no apparent      

      associated signs or symptoms. Severity of symptoms: At their worst the symptoms were        

      mild moderate in the emergency department the symptoms are unchanged. The patient has       

      not experienced similar symptoms in the past.                                               

                                                                                                  

Historical:                                                                                       

- Allergies:                                                                                      

02:22 PENICILLINS;                                                                            vc1 

- Home Meds:                                                                                      

02:22 Quinapril HCl Oral [Active]; Metoprolol Tartrate Oral [Active]; Hydrochlorothiazide     vc1 

      Oral [Active];                                                                              

- PMHx:                                                                                           

02:22 Hypertension;                                                                           vc1 

- PSHx:                                                                                           

02:22 Appendectomy; knee replacement, bilateral; Total abdominal hysterectomy;                vc1 

                                                                                                  

- Immunization history:: Adult Immunizations up to date, Client reports receiving the             

  2nd dose of the Covid vaccine.                                                                  

- Social history:: Smoking status: Patient denies any tobacco usage or history of.                

                                                                                                  

                                                                                                  

ROS:                                                                                              

02:55 Constitutional: Negative for fever, chills, and weight loss, Eyes: Negative for injury, paulo 

      pain, redness, and discharge, ENT: Negative for injury, pain, and discharge, Neck:          

      Negative for injury, pain, and swelling, Cardiovascular: Negative for chest pain,           

      palpitations, and edema, Back: Negative for injury and pain, : Negative for injury,       

      bleeding, discharge, and swelling, MS/Extremity: Negative for injury and deformity,         

      Skin: Negative for injury, rash, and discoloration, Neuro: Negative for headache,           

      weakness, numbness, tingling, and seizure, Psych: Negative for depression, anxiety,         

      suicide ideation, homicidal ideation, and hallucinations, Allergy/Immunology: Negative      

      for hives, rash, and allergies, Endocrine: Negative for neck swelling, polydipsia,          

      polyuria, polyphagia, and marked weight changes.                                            

02:55 Respiratory: Positive for cough, wheezing, expiratory.                                      

                                                                                                  

Exam:                                                                                             

02:55 Constitutional:  This is a well developed, well nourished patient who is awake, alert,  paulo 

      and in no acute distress. Head/Face:  Normocephalic, atraumatic. Eyes:  Pupils equal        

      round and reactive to light, extra-ocular motions intact.  Lids and lashes normal.          

      Conjunctiva and sclera are non-icteric and not injected.  Cornea within normal limits.      

      Periorbital areas with no swelling, redness, or edema. ENT:  Nares patent. No nasal         

      discharge, no septal abnormalities noted.  Tympanic membranes are normal and external       

      auditory canals are clear.  Oropharynx with no redness, swelling, or masses, exudates,      

      or evidence of obstruction, uvula midline.  Mucous membranes moist. Neck:  Trachea          

      midline, no thyromegaly or masses palpated, and no cervical lymphadenopathy.  Supple,       

      full range of motion without nuchal rigidity, or vertebral point tenderness.  No            

      Meningismus. Chest/axilla:  Normal chest wall appearance and motion.  Nontender with no     

      deformity.  No lesions are appreciated. Cardiovascular:  Regular rate and rhythm with a     

      normal S1 and S2.  No gallops, murmurs, or rubs.  Normal PMI, no JVD.  No pulse             

      deficits. Respiratory:  Lungs have equal breath sounds bilaterally, clear to                

      auscultation and percussion.  No rales, rhonchi or wheezes noted.  No increased work of     

      breathing, no retractions or nasal flaring. Abdomen/GI:  Soft, non-tender, with normal      

      bowel sounds.  No distension or tympany.  No guarding or rebound.  No evidence of           

      tenderness throughout. Back:  No spinal tenderness.  No costovertebral tenderness.          

      Full range of motion. Skin:  Warm, dry with normal turgor.  Normal color with no            

      rashes, no lesions, and no evidence of cellulitis. MS/ Extremity:  Pulses equal, no         

      cyanosis.  Neurovascular intact.  Full, normal range of motion. Neuro:  Awake and           

      alert, GCS 15, oriented to person, place, time, and situation.  Cranial nerves II-XII       

      grossly intact.  Motor strength 5/5 in all extremities.  Sensory grossly intact.            

      Cerebellar exam normal.  Normal gait. Psych:  Awake, alert, with orientation to person,     

      place and time.  Behavior, mood, and affect are within normal limits.                       

02:55 Musculoskeletal/extremity: DVT Exam: No signs of deep vein thrombosis. no pain, no          

      swelling, no tenderness, negative Homans' sign noted on exam, no appreciated bluish         

      discoloration, no erythema, no increased warmth.                                            

02:58 Respiratory: Exam negative for  bronchial sounds, decreased breath sounds, rhonchi,     pualo 

      wheezing, mild respiratory distress is noted, Breath sounds: bronchial sounds,              

      decreased breath sounds, rhonchi, stridor, wheezing: that is moderate.                      

06:22 ECG was reviewed by the Attending Physician.                                            [facility] 

                                                                                                  

Vital Signs:                                                                                      

02:19  / 97; Pulse 76; Resp 17; Temp 98.6; Pulse Ox 96% on R/A; Weight 84.37 kg; Height vc1 

      5 ft. 2 in. ; Pain 0/10;                                                                    

03:30  / 58; Pulse 61; Resp 17 S; Pulse Ox 97% on R/A;                                  ha1 

04:30  / 58; Pulse 60; Resp 15 S; Pulse Ox 96% on R/A;                                  ha1 

02:19 Body Mass Index 34.02 (84.37 kg, 157.48 cm)                                             vc1 

02:19 Pain Scale: Adult                                                                       vc1 

                                                                                                  

MDM:                                                                                              

02:38 Patient medically screened.                                                             [facility] 

02:57 Antibiotic administration: The patient is discharged and will get outpatient            [facility] 

      antibiotics, Zithromax. Differential Diagnosis: Obstructed Airway Bronchitis Influenza      

      Upper Respiratory Infection Sinusitis Pharyngitis Allergic Rhinitis Asthma Exacerbation     

      Viral Syndrome Pneumonia. Immunization status: Pneumococcal vaccine: within last 5          

      years. Influenza vaccine: Data reviewed: vital signs, nurses notes, lab test result(s),     

      EKG, radiologic studies, plain films. Consideration of Admission/Observation Patient        

      was admitted/placed on observation. Escalation of care including admission/observation      

      considered. I considered the following discharge prescriptions or medication management     

      in the emergency department Medications were administered in the Emergency Department.      

      See MAR. Independent interpretation of the following test(s) in the Emergency               

      Department EKG: See my EKG interpretation above. Care significantly affected by the         

      following chronic conditions: Hypertension.                                                 

                                                                                                  

03/10                                                                                             

02:51 Order name: Basic Metabolic Panel; Complete Time: 04:26                                 [facility] 

03/10                                                                                             

02:51 Order name: CBC with Diff; Complete Time: 04:                                         [facility] 

03/10                                                                                             

02:51 Order name: LFT's; Complete Time: 04:                                                 [facility] 

03/10                                                                                             

02:51 Order name: Magnesium; Complete Time: 04:26                                             [facility] 

03/10                                                                                             

02:51 Order name: NT PRO-BNP; Complete Time: 04:26                                            [facility] 

03/10                                                                                             

02:51 Order name: PT-INR; Complete Time: 04:26                                                [facility] 

03/10                                                                                             

02:51 Order name: Troponin HS; Complete Time: 04:26                                           [facility] 

03/10                                                                                             

02:51 Order name: XRAY Chest (1 view)                                                         [facility] 

03/10                                                                                             

02:51 Order name: EKG; Complete Time: 03:05                                                   [facility] 

03/10                                                                                             

02:51 Order name: Cardiac monitoring; Complete Time: 05:15                                    [facility] 

03/10                                                                                             

02:51 Order name: EKG - Nurse/Tech; Complete Time: 05:15                                      [facility] 

03/10                                                                                             

02:51 Order name: IV Saline Lock; Complete Time: 03:40                                        [facility] 

03/10                                                                                             

02:51 Order name: Labs collected and sent; Complete Time: 03:40                               [facility] 

03/10                                                                                             

02:51 Order name: O2 Per Protocol; Complete Time: 03:57                                       [facility] 

03/10                                                                                             

02:51 Order name: O2 Sat Monitoring; Complete Time: 03:57                                     [facility] 

                                                                                                  

EC:22 Rate is 58 beats/min. Rhythm is regular. QRS Axis is Normal. CA interval is normal. QRS paulo 

      interval is normal. QT interval is normal. No Q waves. T waves are Normal. Clinical         

      impression: NSR w/ Non-specific ST/T Changes and No evidence of ischemia. Interpreted       

      by me. Reviewed by me.                                                                      

                                                                                                  

Administered Medications:                                                                         

03:45 Drug: predniSONE PO 60 mg Route: PO;                                                    as6 

04:15 Follow up: Response: No adverse reaction                                                ha1 

03:45 Drug: Rocephin IV 1 grams Route: IV; Rate: per protocol; Site: right antecubital;       as6 

04:15 Follow up: Response: No adverse reaction; IV Status: Completed infusion; IV Intake: [id number] 

03:45 Drug: AZITHromycin  mg Route: PO;                                                 as6 

04:15 Follow up: Response: No adverse reaction                                                ha1 

03:50 Drug: Magnesium Sulfate IVPB 2 grams Route: IVPB; Infused Over: 2 hrs; Site: right      as6 

      antecubital;                                                                                

05:00 Follow up: Response: No adverse reaction; IV Status: Completed infusion; IV Intake:     ha1 

      100ml                                                                                       

03:50 Drug: MethylPrednisoLONE  mg Route: IVP; Site: right antecubital;                as6 

04:15 Follow up: Response: No adverse reaction                                                ha1 

03:55 Drug: DuoNeb Nebulize (2.5 mg - 0.5 mg) 3 ml Route: Nebulizer;                          as6 

04:20 Follow up: Response: No adverse reaction                                                ha1 

03:55 Drug: Albuterol Inhalation 5 mg Route: Inhalation;                                      as6 

04:15 Follow up: Response: No adverse reaction                                                ha1 

                                                                                                  

                                                                                                  

Disposition Summary:                                                                              

03/10/23 04:46                                                                                    

Discharge Ordered                                                                                 

      Location: Home                                                                          paulo 

      Problem: new                                                                            paulo 

      Symptoms: have improved                                                                 paulo 

      Condition: Stable                                                                       paulo 

      Diagnosis                                                                                   

        - Acute upper respiratory infection, unspecified                                      paulo 

        - Moderate persistent asthma                                                          paulo 

        - COPD/ Chronic obstructive pulmonary disease with (acute) exacerbation               paulo 

      Followup:                                                                               paulo 

        - With:                                                                                    

        - When: 2 - 3 days                                                                         

        - Reason: Recheck today's complaints, Continuance of care, Re-evaluation by your           

      physician                                                                                   

      Followup:                                                                               paulo 

        - With:                                                                                    

        - When: 2 - 3 days                                                                         

        - Reason: Recheck today's complaints, Continuance of care, Re-evaluation by your           

      physician                                                                                   

      Discharge Instructions:                                                                     

        - Discharge Summary Sheet                                                             paulo 

        - Asthma, Adult                                                                       paulo 

        - Upper Respiratory Infection, Adult                                                  paulo 

        - Cool Mist Vaporizer                                                                 paulo 

        - Chronic Obstructive Pulmonary Disease Exacerbation                                  paulo 

        - Chronic Obstructive Pulmonary Disease, Easy-to-Read                                 paulo 

        - Upper Respiratory Infection, Adult, Easy-to-Read                                    paulo 

        - Asthma, Adult, Easy-to-Read                                                         pualo 

        - Cough, Adult                                                                        paulo 

      Forms:                                                                                      

        - Medication Reconciliation Form                                                      paulo 

        - Thank You Letter                                                                    paulo 

        - Antibiotic Education                                                                paulo 

        - Prescription Opioid Use                                                             paulo 

      Prescriptions:                                                                              

        - albuterol sulfate 90 mcg/actuation Inhalation HFA Aerosol Inhaler                        

            - inhale 2 puff by INHALATION route every 4-6 hours as needed for bronchospasm;   paulo 

      administer via ventilator; 1 unit; Refills: 0, Product Selection Permitted                  

        - Albuterol Sulfate 2.5 mg /3 mL (0.083 %) Inhalation Solution for Nebulization            

            - inhale 1 unit by NEBULIZATION route every 8 hours As needed; 30 unit; Refills:  paulo 

      0, Product Selection Permitted                                                              

        - Prednisone 20 mg Oral Tablet                                                             

            - take 2 tablets by ORAL route once daily for 5 days; 10 tablet; Refills: 0,      [facility] 

      Product Selection Permitted                                                                 

        - Zithromax 500 mg Oral Tablet                                                             

            - take 1 tablet by ORAL route once daily for 5 days; 5 tablet; Refills: 0,        [facility] 

      Product Selection Permitted                                                                 

Signatures:                                                                                       

Dispatcher [employer]                           Oral Adams MD MD cha Slawson, Ashby, RN                      RN   as6                                                  

Marina Fry RN                    RN   vc1                                                  

Li Chavez RN   ha1                                                  

                                                                                                  

**************************************************************************************************

## 2023-03-24 NOTE — ER
Nurse's Notes                                                                                     

 [facility]                                                                 

Name: Lesly Dawson                                                                               

Age: 72 yrs                                                                                       

Sex: Female                                                                                       

: 1950                                                                                   

MRN: [id number]                                                                                   

Arrival Date: 03/10/2023                                                                          

Time: 01:51                                                                                       

Account#: [id number]                                                                            

Bed 2                                                                                             

Private MD: Shakira Jc                                                                              

Diagnosis: Acute upper respiratory infection, unspecified;Moderate persistent asthma;COPD/ Chronic

  obstructive pulmonary disease with (acute) exacerbation                                         

                                                                                                  

Presentation:                                                                                     

03/10                                                                                             

02:19 Chief complaint: Patient states: "I was here  for an upper respiratory       vc1 

      infection and yall prescribed me an inhaler. The last two nights I have been short of       

      breath but I can't take a deep enough breath for the inhaler to work.". Coronavirus         

      screen: Vaccine status: Patient reports receiving the 2nd dose of the covid vaccine.        

      Moderna Client denies travel out of the [place] in the last 14 days. At this time, the         

      client does not indicate any symptoms associated with coronavirus-19. Ebola Screen:         

      Patient negative for fever greater than or equal to 101.5 degrees Fahrenheit, and           

      additional compatible Ebola Virus Disease symptoms Patient denies exposure to               

      infectious person. Patient denies travel to an Ebola-affected area in the 21 days           

      before illness onset. No symptoms or risks identified at this time. Initial Sepsis          

      Screen: Does the patient meet any 2 criteria? No. Patient's initial sepsis screen is        

      negative. Does the patient have a suspected source of infection? No. Patient's initial      

      sepsis screen is negative. Risk Assessment: Do you want to hurt yourself or someone         

      else? Patient reports no desire to harm self or others. Onset of symptoms was 2023.                                                                                       

02:19 Method Of Arrival: Ambulatory                                                           vc1 

02:19 Acuity: STACIE 3                                                                           vc1 

                                                                                                  

Triage Assessment:                                                                                

02:23 General: Appears in no apparent distress. uncomfortable, Behavior is calm, cooperative, vc1 

      appropriate for age. Pain: Denies pain. EENT: No deficits noted. No signs and/or            

      symptoms were reported regarding the EENT system. Neuro: Level of Consciousness is          

      awake, alert, obeys commands. Cardiovascular: No deficits noted. Respiratory: Reports       

      shortness of breath at rest cough that is Airway is patent Respiratory effort is even,      

      unlabored, Respiratory pattern is regular, symmetrical, Breath sounds with wheezes          

      bilaterally. Onset: The symptoms/episode began/occurred yesterday, the patient has mild     

      shortness of breath. GI: No deficits noted. No signs and/or symptoms were reported          

      involving the gastrointestinal system. : No deficits noted. No signs and/or symptoms      

      were reported regarding the genitourinary system. Derm: No deficits noted. No signs         

      and/or symptoms reported regarding the dermatologic system. Musculoskeletal: No             

      deficits noted. No signs and/or symptoms reported regarding the musculoskeletal system.     

                                                                                                  

Historical:                                                                                       

- Allergies:                                                                                      

02:22 PENICILLINS;                                                                            vc1 

- Home Meds:                                                                                      

02:22 Quinapril HCl Oral [Active]; Metoprolol Tartrate Oral [Active]; Hydrochlorothiazide     vc1 

      Oral [Active];                                                                              

- PMHx:                                                                                           

02:22 Hypertension;                                                                           vc1 

- PSHx:                                                                                           

02:22 Appendectomy; knee replacement, bilateral; Total abdominal hysterectomy;                vc1 

                                                                                                  

- Immunization history:: Adult Immunizations up to date, Client reports receiving the             

  2nd dose of the Covid vaccine.                                                                  

- Social history:: Smoking status: Patient denies any tobacco usage or history of.                

                                                                                                  

                                                                                                  

Screenin:24 [facility] ED Fall Risk Assessment (Adult) History of falling in the last 3 months,       vc1 

      including since admission No falls in past 3 months (0 pts) Confusion or Disorientation     

      No (0 pts) Intoxicated or Sedated No (0 pts) Impaired Gait No (0 pts) Mobility Assist       

      Device Used No (0 pt) Altered Elimination No (0 pt) Score/Fall Risk Level 0 - 2 = Low       

      Risk Oriented to surroundings, Maintained a safe environment, Educated pt \T\ family on     

      fall prevention, incl call for assistance when getting out of bed. Abuse screen: Denies     

      threats or abuse. Nutritional screening: No deficits noted. Tuberculosis screening: No      

      symptoms or risk factors identified.                                                        

                                                                                                  

Assessment:                                                                                       

02:25 General: Appears uncomfortable, Behavior is cooperative. Neuro: Level of Consciousness  ha1 

      is awake, alert, obeys commands, Oriented to person, place, time, situation.                

      Cardiovascular: Capillary refill < 3 seconds Patient's skin is warm and dry.                

      Respiratory: Reports shortness of breath at rest Airway is patent Respiratory effort is     

      even, unlabored, Respiratory pattern is regular, symmetrical. GI: No signs and/or           

      symptoms were reported involving the gastrointestinal system.                               

03:25 Reassessment: Patient and/or family updated on plan of care and expected duration. Pain ha1 

      level reassessed. Patient is alert, oriented x 3, equal unlabored respirations, skin        

      warm/dry/pink.                                                                              

04:30 Reassessment: Patient and/or family updated on plan of care and expected duration. Pain ha1 

      level reassessed. Patient is alert, oriented x 3, equal unlabored respirations, skin        

      warm/dry/pink. Patient denies pain at this time. Patient states feeling better. Patient     

      states symptoms have improved.                                                              

05:05 Cardiovascular: Rhythm is sinus bradycardia.                                            ha1 

                                                                                                  

Vital Signs:                                                                                      

02:19  / 97; Pulse 76; Resp 17; Temp 98.6; Pulse Ox 96% on R/A; Weight 84.37 kg; Height vc1 

      5 ft. 2 in. ; Pain 0/10;                                                                    

03:30  / 58; Pulse 61; Resp 17 S; Pulse Ox 97% on R/A;                                  ha1 

04:30  / 58; Pulse 60; Resp 15 S; Pulse Ox 96% on R/A;                                  ha1 

02:19 Body Mass Index 34.02 (84.37 kg, 157.48 cm)                                             vc1 

02:19 Pain Scale: Adult                                                                       vc1 

                                                                                                  

ED Course:                                                                                        

01:51 Patient arrived in ED.                                                                  es  

01:51 Shakira Jc MD is Private Physician.                                                      es  

02:22 Triage completed.                                                                       vc1 

02:24 Arm band placed on right wrist.                                                         vc1 

02:25 Patient has correct armband on for positive identification. Placed in gown. Bed in low  ha1 

      position. Call light in reach. Side rails up X 1.                                           

02:38 Oral Lee MD is Attending Physician.                                             paulo 

03:15 Inserted saline lock: 20 gauge in left antecubital area, using aseptic technique. Blood ha1 

      collected.                                                                                  

03:26 XRAY Chest (1 view) In Process Unspecified.                                             EDMS

03:36 Li Chavez RN is Primary Nurse.                                                      ha1 

04:46 Shakira Jc MD is Referral Physician.                                                     paulo 

04:46 Law Ramirez MD is Referral Physician.                                             paulo 

05:05 No provider procedures requiring assistance completed. IV discontinued, intact,         ha1 

      bleeding controlled, No redness/swelling at site. Pressure dressing applied.                

06:21 Primary Nurse role handed off by iL Chavez RN                                       as6 

                                                                                                  

Administered Medications:                                                                         

03:45 Drug: predniSONE PO 60 mg Route: PO;                                                    as6 

04:15 Follow up: Response: No adverse reaction                                                ha1 

03:45 Drug: Rocephin IV 1 grams Route: IV; Rate: per protocol; Site: right antecubital;       as6 

04:15 Follow up: Response: No adverse reaction; IV Status: Completed infusion; IV Intake: [id number] 

03:45 Drug: AZITHromycin  mg Route: PO;                                                 as6 

04:15 Follow up: Response: No adverse reaction                                                ha1 

03:50 Drug: Magnesium Sulfate IVPB 2 grams Route: IVPB; Infused Over: 2 hrs; Site: right      as6 

      antecubital;                                                                                

05:00 Follow up: Response: No adverse reaction; IV Status: Completed infusion; IV Intake:     ha1 

      100ml                                                                                       

03:50 Drug: MethylPrednisoLONE  mg Route: IVP; Site: right antecubital;                as6 

04:15 Follow up: Response: No adverse reaction                                                ha1 

03:55 Drug: DuoNeb Nebulize (2.5 mg - 0.5 mg) 3 ml Route: Nebulizer;                          as6 

04:20 Follow up: Response: No adverse reaction                                                ha1 

03:55 Drug: Albuterol Inhalation 5 mg Route: Inhalation;                                      as6 

04:15 Follow up: Response: No adverse reaction                                                ha1 

                                                                                                  

                                                                                                  

Medication:                                                                                       

05:05 VIS not applicable for this client.                                                     ha1 

                                                                                                  

Intake:                                                                                           

04:15 IV: 50ml; Total: 50ml.                                                                  ha1 

05:00 IV: 100ml; Total: 150ml.                                                                ha1 

                                                                                                  

Outcome:                                                                                          

04:46 Discharge ordered by MD.                                                                paulo 

05:05 Condition: stable                                                                       ha1 

05:05 Discharged to home ambulatory.                                                          ha1 

05:05 Discharge instructions given to patient, Instructed on discharge instructions, follow       

      up and referral plans. medication usage, Demonstrated understanding of instructions,        

      follow-up care, medications, Prescriptions given X 4.                                       

05:09 Patient left the ED.                                                                    ha1 

06:25 Patient left the ED.                                                                    as6 

                                                                                                  

Signatures:                                                                                       

Dispatcher [employer]                           EDMS                                                 

Oral Lee MD MD cha Salyer, Edna es Slawson, Ashby, RN                      RN   as6                                                  

Marina Fry RN                    RN   vc1                                                  

Li Chavez RN                        RN   ha1                                                  

                                                                                                  

Corrections: (The following items were deleted from the chart)                                    

05:13 05:13 IV Status: Completed infusion; IV Intake: 50ml ha1                                ha1 

05:14 04:15 IV Status: Completed infusion; IV Intake: 50ml ha1                                ha1 

                                                                                                  

************************************************************************************************** Office cancelled appointment on 12/19/22. Rescheduled for 1/18/23 with Daxa

## 2024-11-19 NOTE — XMS REPORT
[facility]

                            Created on:2019



Patient:Lesly Dawson

Sex:Female

:1950

External Reference #:[id number]





Demographics







                          Address                   [address] [place] [id number]

 

                          Phone                     [phone]

 

                          Preferred Language        en

 

                          Marital Status            Unknown

 

                          Quaker Affiliation     Unknown

 

                          Race                      White

 

                          Ethnic Group              Unknown









Author







                          Organization              [employer]









Care Team Providers







                    Name                Role                Phone

 

                    Navarro Bakari      Provider Role       Unavailable









Allergies, Adverse Reactions, Alerts







                    Substance           Reaction            Event Type

 

                    PCN                 Info Not Available  Drug Allergy







Problems







             Problem Type Condition    Code         Onset Dates  Condition Statu

s

 

             Problem      Hyperlipidemia E78.5                     Active

 

             Problem      GERD (gastroesophageal reflux K21.9                   

  Active



                          disease)                               

 

             Problem      Obese        E66.9                     Active

 

             Problem      Pain, joint, knee, left M25.562                   Acti

ve

 

             Problem      Pain, joint, knee, right M25.561                   Act

carimna

 

             Problem      Primary osteoarthritis of left knee M17.12            

        Active

 

             Problem      Seborrheic keratoses L82.1                     Active

 

             Problem      Seasonal allergic rhinitis, J30.2                     

Active



                          unspecified trigger                           

 

             Problem      Primary osteoarthritis of right M17.11                

    Active



                          knee                                   

 

             Problem      Right sciatic nerve pain M54.31                    Act

carmina

 

             Assessment   Right sciatic nerve pain M54.31                    Act

carmina

 

             Assessment   Pain, joint, knee, left M25.562                   Acti

ve

 

             Assessment   Primary osteoarthritis of left knee M17.12            

        Active

 

             Assessment   Pain, joint, knee, right M25.561                   Act

carmina

 

             Problem      Metabolic syndrome X E88.81                    Active

 

             Assessment   Primary osteoarthritis of right M17.11                

    Active



                          knee                                   

 

             Problem      Benign essential HTN I10                       Active







Medications







        Medication Code    Code    Instructions Start   End     Status  Dosage



                System                  Date    Date            

 

        Metoprolol Succinate NDC     01842732562 25 MG Orally                 Ac

tive  1 tablet



        ER                      Once a day                         

 

        Flonase NDC     10473355612 50 MCG/ACT                 Active  1 spray



                                Nasally Once a                         in each



                                day                             nostril

 

        Hydrochlorothiazide NDC     89709803637 25 MG Orally                 Act

carmina  1 tablet



                                Once a day                         in the



                                                                morning

 

        Omeprazole NDC     99501057801 40 MG Orally                 Active  1 ca

psule



                                Once a day                         

 

        Low-Dose Aspirin NDC     0                               Active  not



                                                                defined

 

        Tessalon Perles NDC     68752750485 100MG                   Active  1 ca

p(s)



                                                                3 times a



                                                                day as



                                                                needed

 

        Accupril NDC     [id number] 40 MG Orally                 Active  1 tabl

et



                                Once a day                         

 

        Tums    NDC     02083659080 500 MG Orally                 Active  1 tabl

et



                                Once a day                         







Results

No Known Results



Summary Purpose

[employer] Submission Yes

## 2025-04-03 NOTE — EKG
Test Date:    2023-02-17               Test Time:    11:17:48

Technician:   TM                                     

                                                     

MEASUREMENT RESULTS:                                       

Intervals:                                           

Rate:         72                                     

FL:           174                                    

QRSD:         106                                    

QT:           424                                    

QTc:          464                                    

Axis:                                                

P:            66                                     

FL:           174                                    

QRS:          31                                     

T:            58                                     

                                                     

INTERPRETIVE STATEMENTS:                                       

                                                     

Normal sinus rhythm

Septal infarct, age undetermined

Abnormal ECG

No previous ECG available for comparison



Electronically Signed On 02-20-23 12:38:33 CST by Dominick Sanchez no